# Patient Record
Sex: FEMALE | Race: WHITE | NOT HISPANIC OR LATINO | URBAN - METROPOLITAN AREA
[De-identification: names, ages, dates, MRNs, and addresses within clinical notes are randomized per-mention and may not be internally consistent; named-entity substitution may affect disease eponyms.]

---

## 2017-01-10 ENCOUNTER — OUTPATIENT (OUTPATIENT)
Dept: OUTPATIENT SERVICES | Facility: HOSPITAL | Age: 82
LOS: 1 days | End: 2017-01-10
Payer: MEDICARE

## 2017-01-10 VITALS
SYSTOLIC BLOOD PRESSURE: 132 MMHG | HEART RATE: 90 BPM | OXYGEN SATURATION: 99 % | DIASTOLIC BLOOD PRESSURE: 70 MMHG | WEIGHT: 177.03 LBS | TEMPERATURE: 99 F | HEIGHT: 59 IN | RESPIRATION RATE: 20 BRPM

## 2017-01-10 DIAGNOSIS — M75.122 COMPLETE ROTATOR CUFF TEAR OR RUPTURE OF LEFT SHOULDER, NOT SPECIFIED AS TRAUMATIC: ICD-10-CM

## 2017-01-10 DIAGNOSIS — H26.9 UNSPECIFIED CATARACT: ICD-10-CM

## 2017-01-10 DIAGNOSIS — E78.5 HYPERLIPIDEMIA, UNSPECIFIED: ICD-10-CM

## 2017-01-10 DIAGNOSIS — E66.9 OBESITY, UNSPECIFIED: ICD-10-CM

## 2017-01-10 DIAGNOSIS — Z90.710 ACQUIRED ABSENCE OF BOTH CERVIX AND UTERUS: Chronic | ICD-10-CM

## 2017-01-10 DIAGNOSIS — J45.909 UNSPECIFIED ASTHMA, UNCOMPLICATED: ICD-10-CM

## 2017-01-10 DIAGNOSIS — Z98.890 OTHER SPECIFIED POSTPROCEDURAL STATES: Chronic | ICD-10-CM

## 2017-01-10 DIAGNOSIS — E03.9 HYPOTHYROIDISM, UNSPECIFIED: ICD-10-CM

## 2017-01-10 DIAGNOSIS — I10 ESSENTIAL (PRIMARY) HYPERTENSION: ICD-10-CM

## 2017-01-10 LAB
APPEARANCE UR: CLEAR — SIGNIFICANT CHANGE UP
APTT BLD: 32.2 SEC — SIGNIFICANT CHANGE UP (ref 27.5–37.4)
BILIRUB UR-MCNC: NEGATIVE — SIGNIFICANT CHANGE UP
BLD GP AB SCN SERPL QL: NEGATIVE — SIGNIFICANT CHANGE UP
BLOOD UR QL VISUAL: NEGATIVE — SIGNIFICANT CHANGE UP
BUN SERPL-MCNC: 25 MG/DL — HIGH (ref 7–23)
CALCIUM SERPL-MCNC: 10.1 MG/DL — SIGNIFICANT CHANGE UP (ref 8.4–10.5)
CHLORIDE SERPL-SCNC: 98 MMOL/L — SIGNIFICANT CHANGE UP (ref 98–107)
CO2 SERPL-SCNC: 23 MMOL/L — SIGNIFICANT CHANGE UP (ref 22–31)
COLOR SPEC: SIGNIFICANT CHANGE UP
CREAT SERPL-MCNC: 1.08 MG/DL — SIGNIFICANT CHANGE UP (ref 0.5–1.3)
GLUCOSE SERPL-MCNC: 64 MG/DL — LOW (ref 70–99)
GLUCOSE UR-MCNC: NEGATIVE — SIGNIFICANT CHANGE UP
HCT VFR BLD CALC: 32.9 % — LOW (ref 34.5–45)
HGB BLD-MCNC: 10.5 G/DL — LOW (ref 11.5–15.5)
HYALINE CASTS # UR AUTO: SIGNIFICANT CHANGE UP (ref 0–?)
INR BLD: 0.98 — SIGNIFICANT CHANGE UP (ref 0.87–1.18)
KETONES UR-MCNC: NEGATIVE — SIGNIFICANT CHANGE UP
LEUKOCYTE ESTERASE UR-ACNC: HIGH
MCHC RBC-ENTMCNC: 30.3 PG — SIGNIFICANT CHANGE UP (ref 27–34)
MCHC RBC-ENTMCNC: 31.9 % — LOW (ref 32–36)
MCV RBC AUTO: 94.8 FL — SIGNIFICANT CHANGE UP (ref 80–100)
MUCOUS THREADS # UR AUTO: SIGNIFICANT CHANGE UP
NITRITE UR-MCNC: NEGATIVE — SIGNIFICANT CHANGE UP
PH UR: 6.5 — SIGNIFICANT CHANGE UP (ref 4.6–8)
PLATELET # BLD AUTO: 224 K/UL — SIGNIFICANT CHANGE UP (ref 150–400)
PMV BLD: 11.2 FL — SIGNIFICANT CHANGE UP (ref 7–13)
POTASSIUM SERPL-MCNC: 4.2 MMOL/L — SIGNIFICANT CHANGE UP (ref 3.5–5.3)
POTASSIUM SERPL-SCNC: 4.2 MMOL/L — SIGNIFICANT CHANGE UP (ref 3.5–5.3)
PROT UR-MCNC: 20 — SIGNIFICANT CHANGE UP
PROTHROM AB SERPL-ACNC: 11.2 SEC — SIGNIFICANT CHANGE UP (ref 10–13.1)
RBC # BLD: 3.47 M/UL — LOW (ref 3.8–5.2)
RBC # FLD: 14.6 % — HIGH (ref 10.3–14.5)
RBC CASTS # UR COMP ASSIST: SIGNIFICANT CHANGE UP (ref 0–?)
RH IG SCN BLD-IMP: POSITIVE — SIGNIFICANT CHANGE UP
SODIUM SERPL-SCNC: 141 MMOL/L — SIGNIFICANT CHANGE UP (ref 135–145)
SP GR SPEC: 1.01 — SIGNIFICANT CHANGE UP (ref 1–1.03)
UROBILINOGEN FLD QL: NORMAL E.U. — SIGNIFICANT CHANGE UP (ref 0.1–0.2)
WBC # BLD: 23.78 K/UL — HIGH (ref 3.8–10.5)
WBC # FLD AUTO: 23.78 K/UL — HIGH (ref 3.8–10.5)
WBC UR QL: SIGNIFICANT CHANGE UP (ref 0–?)

## 2017-01-10 PROCEDURE — 71020: CPT | Mod: 26

## 2017-01-10 PROCEDURE — 93010 ELECTROCARDIOGRAM REPORT: CPT

## 2017-01-10 PROCEDURE — 73030 X-RAY EXAM OF SHOULDER: CPT | Mod: 26,LT

## 2017-01-10 RX ORDER — SODIUM CHLORIDE 9 MG/ML
3 INJECTION INTRAMUSCULAR; INTRAVENOUS; SUBCUTANEOUS EVERY 8 HOURS
Qty: 0 | Refills: 0 | Status: DISCONTINUED | OUTPATIENT
Start: 2017-01-23 | End: 2017-01-26

## 2017-01-10 NOTE — H&P PST ADULT - NEGATIVE GASTROINTESTINAL SYMPTOMS
no vomiting/no change in bowel habits/no abdominal pain/no constipation/no diarrhea/no melena/no flatulence/no nausea

## 2017-01-10 NOTE — H&P PST ADULT - NEGATIVE CARDIOVASCULAR SYMPTOMS
no orthopnea/no claudication/no peripheral edema/no chest pain/no palpitations/no dyspnea on exertion/no paroxysmal nocturnal dyspnea

## 2017-01-10 NOTE — H&P PST ADULT - NEGATIVE ALLERGY TYPES
no indoor environmental allergies/no reactions to medicines/no outdoor environmental allergies/no reactions to food

## 2017-01-10 NOTE — H&P PST ADULT - NSANTHOSAYNRD_GEN_A_CORE
No. REJI screening performed.  STOP BANG Legend: 0-2 = LOW Risk; 3-4 = INTERMEDIATE Risk; 5-8 = HIGH Risk

## 2017-01-10 NOTE — H&P PST ADULT - NEGATIVE OPHTHALMOLOGIC SYMPTOMS
no diplopia/no photophobia no scleral injection R/no loss of vision L/no pain L/no scleral injection L/no irritation R/no photophobia/no discharge L/no loss of vision R/no discharge R/no diplopia/no pain R/no irritation L

## 2017-01-10 NOTE — H&P PST ADULT - MUSCULOSKELETAL
detailed exam ROM intact/no joint warmth/no calf tenderness/no joint swelling/normal strength/no joint erythema details… no joint warmth/no joint erythema/decreased ROM due to pain/no calf tenderness/normal strength/no joint swelling

## 2017-01-10 NOTE — H&P PST ADULT - NS MD HP INPLANTS MED DEV
dental implants & right toatl shoulder replacement/Artificial joint dental implants & right total shoulder replacement/Artificial joint

## 2017-01-10 NOTE — H&P PST ADULT - HISTORY OF PRESENT ILLNESS
80 yo female came to department accompanied by daughter with medical h/o Hypothyroidism, Asthma, HTN and HLD. Pt reports h/o Rotator cuff problems to both shoulders in which she had Right total shoulder replacement done in summer 2016. Pt presents today for presurgical evaluation for Left Total Shoulder Replacement, Possible Reverse scheduled for 1/23/2017.

## 2017-01-10 NOTE — H&P PST ADULT - RS GEN PE MLT RESP DETAILS PC
good air movement/breath sounds equal/clear to auscultation bilaterally/respirations non-labored/airway patent

## 2017-01-10 NOTE — H&P PST ADULT - NEGATIVE BREAST SYMPTOMS
no nipple discharge L/no nipple discharge R/no breast lump R/no breast tenderness L/no breast tenderness R/no breast lump L

## 2017-01-10 NOTE — H&P PST ADULT - NEGATIVE ENMT SYMPTOMS
no post-nasal discharge/no tinnitus/no nasal discharge/no hearing difficulty/no vertigo/no throat pain/no nasal obstruction/no nasal congestion/no ear pain/no sinus symptoms/no dysphagia no throat pain/no nasal discharge/no ear pain/no sinus symptoms/no dysphagia/no dry mouth/no tinnitus/no vertigo/no hearing difficulty/no nasal congestion/no nasal obstruction/no post-nasal discharge

## 2017-01-10 NOTE — H&P PST ADULT - NEGATIVE NEUROLOGICAL SYMPTOMS
no generalized seizures/no syncope/no transient paralysis/no paresthesias/no vertigo/no focal seizures/no loss of sensation/no weakness/no tremors no transient paralysis/no headache/no loss of consciousness/no loss of sensation/no vertigo/no generalized seizures/no focal seizures/no tremors/no paresthesias/no syncope/no weakness

## 2017-01-10 NOTE — H&P PST ADULT - PMH
Anal cancer  diagnosed in 2010, had chemotherapy and radiation in 2010.  Asthma  no h/o intubation, last episode of hospitalization due to asthma 10 years ago . No h/o medications on a daily basis  Complete rotator cuff tear or rupture of left shoulder, not specified as traumatic    Complete rotator cuff tear or rupture of right shoulder, not specified as traumatic    Hyperlipemia    Hypertension    Hypothyroidism Anal cancer  diagnosed in 2010, had chemotherapy and radiation in 2010.  Asthma  no h/o intubation, last episode of hospitalization due to asthma 10 years ago . No h/o medications on a daily basis  Cataract    Complete rotator cuff tear or rupture of left shoulder, not specified as traumatic    Complete rotator cuff tear or rupture of right shoulder, not specified as traumatic    Hyperlipemia    Hypertension    Hypothyroidism

## 2017-01-10 NOTE — H&P PST ADULT - PROBLEM SELECTOR PLAN 1
Left Total Shoulder Replacement, Possible Reverse scheduled for 1/23/2017.  Pre-op instructions given. Pt verbalized understanding.  Pepcid given for GI prophylaxis.  Chlorhexidine wash instructions given.  Ortho order set completed.  Medical clearance requested - METS less than 4 Left Total Shoulder Replacement, Possible Reverse scheduled for 1/23/2017.  Pre-op instructions given. Pt verbalized understanding.  Pepcid given for GI prophylaxis.  Chlorhexidine wash instructions given.  Ortho order set completed.  Pt sent for CXR and left shoulder surgery  Medical clearance requested - METS less than 4 Left Total Shoulder Replacement, Possible Reverse scheduled for 1/23/2017.  Pre-op instructions given. Pt verbalized understanding.  Pepcid given for GI prophylaxis.  Chlorhexidine wash instructions given.  Pt sent for CXR and left shoulder XRAY.  Medical clearance requested - METS less than 4, SOB with activity.

## 2017-01-10 NOTE — H&P PST ADULT - MALLAMPATI CLASS
Class III - visualization of the soft palate and the base of the uvula Class II - visualization of the soft palate, fauces, and uvula

## 2017-01-10 NOTE — H&P PST ADULT - LYMPHATIC
anterior cervical R/anterior cervical L/posterior cervical R/posterior cervical L/supraclavicular L/supraclavicular R

## 2017-01-10 NOTE — H&P PST ADULT - NEGATIVE MUSCULOSKELETAL SYMPTOMS
no muscle weakness/no neck pain/no muscle cramps/no myalgia/no leg pain L/no leg pain R/no arthritis/no back pain/no joint swelling

## 2017-01-10 NOTE — H&P PST ADULT - CONSTITUTIONAL DETAILS
well-nourished/BMI 35.8/no distress/well-developed/well-groomed well-nourished/well-groomed/obese/well-developed/BMI 35.8/no distress

## 2017-01-10 NOTE — H&P PST ADULT - NEGATIVE PSYCHIATRIC SYMPTOMS
no memory loss/no mood swings/no agitation/no insomnia/no paranoia/no anxiety/no depression/no suicidal ideation

## 2017-01-12 LAB
BACTERIA UR CULT: SIGNIFICANT CHANGE UP
SPECIMEN SOURCE: SIGNIFICANT CHANGE UP

## 2017-01-20 NOTE — ASU PATIENT PROFILE, ADULT - PMH
Anal cancer  diagnosed in 2010, had chemotherapy and radiation in 2010.  Asthma  no h/o intubation, last episode of hospitalization due to asthma 10 years ago . No h/o medications on a daily basis  Cataract    Complete rotator cuff tear or rupture of left shoulder, not specified as traumatic    Complete rotator cuff tear or rupture of right shoulder, not specified as traumatic    Hyperlipemia    Hypertension    Hypothyroidism

## 2017-01-22 ENCOUNTER — RESULT REVIEW (OUTPATIENT)
Age: 82
End: 2017-01-22

## 2017-01-23 ENCOUNTER — INPATIENT (INPATIENT)
Facility: HOSPITAL | Age: 82
LOS: 2 days | Discharge: NURSING, REHAB OR LONG-TERM CARE | End: 2017-01-26
Attending: NEUROMUSCULOSKELETAL MEDICINE, SPORTS MEDICINE | Admitting: NEUROMUSCULOSKELETAL MEDICINE, SPORTS MEDICINE
Payer: MEDICARE

## 2017-01-23 VITALS
OXYGEN SATURATION: 99 % | RESPIRATION RATE: 16 BRPM | WEIGHT: 177.03 LBS | HEART RATE: 62 BPM | DIASTOLIC BLOOD PRESSURE: 68 MMHG | HEIGHT: 59 IN | SYSTOLIC BLOOD PRESSURE: 127 MMHG | TEMPERATURE: 98 F

## 2017-01-23 DIAGNOSIS — Z98.890 OTHER SPECIFIED POSTPROCEDURAL STATES: Chronic | ICD-10-CM

## 2017-01-23 DIAGNOSIS — M75.122 COMPLETE ROTATOR CUFF TEAR OR RUPTURE OF LEFT SHOULDER, NOT SPECIFIED AS TRAUMATIC: ICD-10-CM

## 2017-01-23 DIAGNOSIS — Z90.710 ACQUIRED ABSENCE OF BOTH CERVIX AND UTERUS: Chronic | ICD-10-CM

## 2017-01-23 LAB
BUN SERPL-MCNC: 23 MG/DL — SIGNIFICANT CHANGE UP (ref 7–23)
CALCIUM SERPL-MCNC: 9.2 MG/DL — SIGNIFICANT CHANGE UP (ref 8.4–10.5)
CHLORIDE SERPL-SCNC: 106 MMOL/L — SIGNIFICANT CHANGE UP (ref 98–107)
CO2 SERPL-SCNC: 21 MMOL/L — LOW (ref 22–31)
CREAT SERPL-MCNC: 1.35 MG/DL — HIGH (ref 0.5–1.3)
GLUCOSE SERPL-MCNC: 132 MG/DL — HIGH (ref 70–99)
HCT VFR BLD CALC: 30.1 % — LOW (ref 34.5–45)
HGB BLD-MCNC: 9.7 G/DL — LOW (ref 11.5–15.5)
MCHC RBC-ENTMCNC: 30.2 PG — SIGNIFICANT CHANGE UP (ref 27–34)
MCHC RBC-ENTMCNC: 32.2 % — SIGNIFICANT CHANGE UP (ref 32–36)
MCV RBC AUTO: 93.8 FL — SIGNIFICANT CHANGE UP (ref 80–100)
PLATELET # BLD AUTO: 194 K/UL — SIGNIFICANT CHANGE UP (ref 150–400)
PMV BLD: 11 FL — SIGNIFICANT CHANGE UP (ref 7–13)
POTASSIUM SERPL-MCNC: 4.3 MMOL/L — SIGNIFICANT CHANGE UP (ref 3.5–5.3)
POTASSIUM SERPL-SCNC: 4.3 MMOL/L — SIGNIFICANT CHANGE UP (ref 3.5–5.3)
RBC # BLD: 3.21 M/UL — LOW (ref 3.8–5.2)
RBC # FLD: 14.4 % — SIGNIFICANT CHANGE UP (ref 10.3–14.5)
SODIUM SERPL-SCNC: 142 MMOL/L — SIGNIFICANT CHANGE UP (ref 135–145)
WBC # BLD: 13.2 K/UL — HIGH (ref 3.8–10.5)
WBC # FLD AUTO: 13.2 K/UL — HIGH (ref 3.8–10.5)

## 2017-01-23 PROCEDURE — 88304 TISSUE EXAM BY PATHOLOGIST: CPT | Mod: 26

## 2017-01-23 PROCEDURE — 73030 X-RAY EXAM OF SHOULDER: CPT | Mod: 26,LT

## 2017-01-23 PROCEDURE — 88311 DECALCIFY TISSUE: CPT | Mod: 26

## 2017-01-23 RX ORDER — VALSARTAN 80 MG/1
160 TABLET ORAL DAILY
Qty: 0 | Refills: 0 | Status: DISCONTINUED | OUTPATIENT
Start: 2017-01-23 | End: 2017-01-26

## 2017-01-23 RX ORDER — POLYETHYLENE GLYCOL 3350 17 G/17G
17 POWDER, FOR SOLUTION ORAL DAILY
Qty: 0 | Refills: 0 | Status: DISCONTINUED | OUTPATIENT
Start: 2017-01-23 | End: 2017-01-26

## 2017-01-23 RX ORDER — ONDANSETRON 8 MG/1
4 TABLET, FILM COATED ORAL EVERY 6 HOURS
Qty: 0 | Refills: 0 | Status: DISCONTINUED | OUTPATIENT
Start: 2017-01-23 | End: 2017-01-26

## 2017-01-23 RX ORDER — MAGNESIUM HYDROXIDE 400 MG/1
30 TABLET, CHEWABLE ORAL DAILY
Qty: 0 | Refills: 0 | Status: DISCONTINUED | OUTPATIENT
Start: 2017-01-23 | End: 2017-01-26

## 2017-01-23 RX ORDER — LEVOTHYROXINE SODIUM 125 MCG
88 TABLET ORAL DAILY
Qty: 0 | Refills: 0 | Status: DISCONTINUED | OUTPATIENT
Start: 2017-01-23 | End: 2017-01-26

## 2017-01-23 RX ORDER — OXYCODONE HYDROCHLORIDE 5 MG/1
10 TABLET ORAL EVERY 4 HOURS
Qty: 0 | Refills: 0 | Status: DISCONTINUED | OUTPATIENT
Start: 2017-01-23 | End: 2017-01-26

## 2017-01-23 RX ORDER — ALBUTEROL 90 UG/1
2 AEROSOL, METERED ORAL EVERY 6 HOURS
Qty: 0 | Refills: 0 | Status: DISCONTINUED | OUTPATIENT
Start: 2017-01-23 | End: 2017-01-26

## 2017-01-23 RX ORDER — ACETAMINOPHEN 500 MG
650 TABLET ORAL EVERY 6 HOURS
Qty: 0 | Refills: 0 | Status: DISCONTINUED | OUTPATIENT
Start: 2017-01-23 | End: 2017-01-26

## 2017-01-23 RX ORDER — DOCUSATE SODIUM 100 MG
100 CAPSULE ORAL THREE TIMES A DAY
Qty: 0 | Refills: 0 | Status: DISCONTINUED | OUTPATIENT
Start: 2017-01-23 | End: 2017-01-26

## 2017-01-23 RX ORDER — SODIUM CHLORIDE 9 MG/ML
1000 INJECTION, SOLUTION INTRAVENOUS
Qty: 0 | Refills: 0 | Status: DISCONTINUED | OUTPATIENT
Start: 2017-01-23 | End: 2017-01-23

## 2017-01-23 RX ORDER — ACETAMINOPHEN 500 MG
650 TABLET ORAL EVERY 6 HOURS
Qty: 0 | Refills: 0 | Status: DISCONTINUED | OUTPATIENT
Start: 2017-01-23 | End: 2017-01-25

## 2017-01-23 RX ORDER — CEFAZOLIN SODIUM 1 G
2000 VIAL (EA) INJECTION EVERY 8 HOURS
Qty: 0 | Refills: 0 | Status: COMPLETED | OUTPATIENT
Start: 2017-01-23 | End: 2017-01-24

## 2017-01-23 RX ORDER — OXYCODONE HYDROCHLORIDE 5 MG/1
5 TABLET ORAL EVERY 4 HOURS
Qty: 0 | Refills: 0 | Status: DISCONTINUED | OUTPATIENT
Start: 2017-01-23 | End: 2017-01-26

## 2017-01-23 RX ORDER — ASPIRIN/CALCIUM CARB/MAGNESIUM 324 MG
325 TABLET ORAL DAILY
Qty: 0 | Refills: 0 | Status: DISCONTINUED | OUTPATIENT
Start: 2017-01-23 | End: 2017-01-26

## 2017-01-23 RX ORDER — SODIUM CHLORIDE 9 MG/ML
1000 INJECTION, SOLUTION INTRAVENOUS
Qty: 0 | Refills: 0 | Status: DISCONTINUED | OUTPATIENT
Start: 2017-01-23 | End: 2017-01-24

## 2017-01-23 RX ORDER — SENNA PLUS 8.6 MG/1
2 TABLET ORAL AT BEDTIME
Qty: 0 | Refills: 0 | Status: DISCONTINUED | OUTPATIENT
Start: 2017-01-23 | End: 2017-01-25

## 2017-01-23 RX ORDER — SIMVASTATIN 20 MG/1
10 TABLET, FILM COATED ORAL AT BEDTIME
Qty: 0 | Refills: 0 | Status: DISCONTINUED | OUTPATIENT
Start: 2017-01-23 | End: 2017-01-26

## 2017-01-23 RX ORDER — MORPHINE SULFATE 50 MG/1
4 CAPSULE, EXTENDED RELEASE ORAL EVERY 4 HOURS
Qty: 0 | Refills: 0 | Status: DISCONTINUED | OUTPATIENT
Start: 2017-01-23 | End: 2017-01-24

## 2017-01-23 RX ADMIN — SODIUM CHLORIDE 75 MILLILITER(S): 9 INJECTION, SOLUTION INTRAVENOUS at 16:30

## 2017-01-23 RX ADMIN — Medication 100 MILLIGRAM(S): at 23:24

## 2017-01-23 RX ADMIN — SIMVASTATIN 10 MILLIGRAM(S): 20 TABLET, FILM COATED ORAL at 21:56

## 2017-01-23 RX ADMIN — Medication 650 MILLIGRAM(S): at 22:25

## 2017-01-23 RX ADMIN — Medication 650 MILLIGRAM(S): at 21:56

## 2017-01-23 RX ADMIN — Medication 100 MILLIGRAM(S): at 21:56

## 2017-01-23 NOTE — BRIEF OPERATIVE NOTE - POST-OP DX
Osteoarthritis of left shoulder, unspecified osteoarthritis type  01/23/2017    Active  Beatrice Fragoso

## 2017-01-23 NOTE — PATIENT PROFILE ADULT. - PMH
Anal cancer  diagnosed in 2010, had chemotherapy and radiation in 2010.  Asthma  no h/o intubation, last episode of hospitalization due to asthma 10 years ago . No h/o medications on a daily basis  Complete rotator cuff tear or rupture of right shoulder, not specified as traumatic    Hypertension    Hypothyroidism

## 2017-01-24 LAB
BUN SERPL-MCNC: 26 MG/DL — HIGH (ref 7–23)
BUN SERPL-MCNC: 27 MG/DL — HIGH (ref 7–23)
CALCIUM SERPL-MCNC: 9 MG/DL — SIGNIFICANT CHANGE UP (ref 8.4–10.5)
CALCIUM SERPL-MCNC: 9.7 MG/DL — SIGNIFICANT CHANGE UP (ref 8.4–10.5)
CHLORIDE SERPL-SCNC: 105 MMOL/L — SIGNIFICANT CHANGE UP (ref 98–107)
CHLORIDE SERPL-SCNC: 105 MMOL/L — SIGNIFICANT CHANGE UP (ref 98–107)
CO2 SERPL-SCNC: 21 MMOL/L — LOW (ref 22–31)
CO2 SERPL-SCNC: 23 MMOL/L — SIGNIFICANT CHANGE UP (ref 22–31)
CREAT SERPL-MCNC: 1.27 MG/DL — SIGNIFICANT CHANGE UP (ref 0.5–1.3)
CREAT SERPL-MCNC: 1.38 MG/DL — HIGH (ref 0.5–1.3)
GLUCOSE SERPL-MCNC: 130 MG/DL — HIGH (ref 70–99)
GLUCOSE SERPL-MCNC: 141 MG/DL — HIGH (ref 70–99)
HCT VFR BLD CALC: 26.4 % — LOW (ref 34.5–45)
HGB BLD-MCNC: 8.3 G/DL — LOW (ref 11.5–15.5)
MCHC RBC-ENTMCNC: 29.4 PG — SIGNIFICANT CHANGE UP (ref 27–34)
MCHC RBC-ENTMCNC: 31.4 % — LOW (ref 32–36)
MCV RBC AUTO: 93.6 FL — SIGNIFICANT CHANGE UP (ref 80–100)
PLATELET # BLD AUTO: 172 K/UL — SIGNIFICANT CHANGE UP (ref 150–400)
PMV BLD: 11.5 FL — SIGNIFICANT CHANGE UP (ref 7–13)
POTASSIUM SERPL-MCNC: 5.4 MMOL/L — HIGH (ref 3.5–5.3)
POTASSIUM SERPL-MCNC: 5.8 MMOL/L — HIGH (ref 3.5–5.3)
POTASSIUM SERPL-SCNC: 5.4 MMOL/L — HIGH (ref 3.5–5.3)
POTASSIUM SERPL-SCNC: 5.8 MMOL/L — HIGH (ref 3.5–5.3)
RBC # BLD: 2.82 M/UL — LOW (ref 3.8–5.2)
RBC # FLD: 14.6 % — HIGH (ref 10.3–14.5)
SODIUM SERPL-SCNC: 140 MMOL/L — SIGNIFICANT CHANGE UP (ref 135–145)
SODIUM SERPL-SCNC: 140 MMOL/L — SIGNIFICANT CHANGE UP (ref 135–145)
WBC # BLD: 13.08 K/UL — HIGH (ref 3.8–10.5)
WBC # FLD AUTO: 13.08 K/UL — HIGH (ref 3.8–10.5)

## 2017-01-24 RX ORDER — SODIUM POLYSTYRENE SULFONATE 4.1 MEQ/G
15 POWDER, FOR SUSPENSION ORAL ONCE
Qty: 0 | Refills: 0 | Status: COMPLETED | OUTPATIENT
Start: 2017-01-24 | End: 2017-01-24

## 2017-01-24 RX ORDER — SODIUM CHLORIDE 9 MG/ML
1000 INJECTION INTRAMUSCULAR; INTRAVENOUS; SUBCUTANEOUS
Qty: 0 | Refills: 0 | Status: DISCONTINUED | OUTPATIENT
Start: 2017-01-24 | End: 2017-01-26

## 2017-01-24 RX ORDER — MORPHINE SULFATE 50 MG/1
2 CAPSULE, EXTENDED RELEASE ORAL EVERY 4 HOURS
Qty: 0 | Refills: 0 | Status: DISCONTINUED | OUTPATIENT
Start: 2017-01-24 | End: 2017-01-26

## 2017-01-24 RX ADMIN — Medication 650 MILLIGRAM(S): at 23:57

## 2017-01-24 RX ADMIN — Medication 88 MICROGRAM(S): at 05:28

## 2017-01-24 RX ADMIN — Medication 100 MILLIGRAM(S): at 06:58

## 2017-01-24 RX ADMIN — POLYETHYLENE GLYCOL 3350 17 GRAM(S): 17 POWDER, FOR SOLUTION ORAL at 11:20

## 2017-01-24 RX ADMIN — Medication 100 MILLIGRAM(S): at 21:30

## 2017-01-24 RX ADMIN — SODIUM CHLORIDE 75 MILLILITER(S): 9 INJECTION INTRAMUSCULAR; INTRAVENOUS; SUBCUTANEOUS at 11:20

## 2017-01-24 RX ADMIN — SODIUM CHLORIDE 3 MILLILITER(S): 9 INJECTION INTRAMUSCULAR; INTRAVENOUS; SUBCUTANEOUS at 13:53

## 2017-01-24 RX ADMIN — Medication 650 MILLIGRAM(S): at 18:10

## 2017-01-24 RX ADMIN — Medication 650 MILLIGRAM(S): at 07:07

## 2017-01-24 RX ADMIN — SODIUM POLYSTYRENE SULFONATE 15 GRAM(S): 4.1 POWDER, FOR SUSPENSION ORAL at 20:33

## 2017-01-24 RX ADMIN — Medication 100 MILLIGRAM(S): at 13:57

## 2017-01-24 RX ADMIN — SIMVASTATIN 10 MILLIGRAM(S): 20 TABLET, FILM COATED ORAL at 21:30

## 2017-01-24 RX ADMIN — SODIUM CHLORIDE 3 MILLILITER(S): 9 INJECTION INTRAMUSCULAR; INTRAVENOUS; SUBCUTANEOUS at 21:26

## 2017-01-24 RX ADMIN — Medication 325 MILLIGRAM(S): at 11:20

## 2017-01-24 RX ADMIN — Medication 650 MILLIGRAM(S): at 17:12

## 2017-01-24 NOTE — DISCHARGE NOTE ADULT - PATIENT PORTAL LINK FT
“You can access the Follow[facility] Patient Portal, offered by [facility], by registering with the following website: [email or web address]”

## 2017-01-24 NOTE — DISCHARGE NOTE ADULT - HOSPITAL COURSE
82yo  is s/p left total shoulder arthroplasty on 1/23/17 without any intraoperative complications.  Pt is doing well and stable for discharge. Leave aquacel bandage intact until follow up visit POD#14. D/C sutures/staples POD #14 if present.  Non-weight bearing to the surgical arm, no external rotaion >30degree, may do Codmans. call Dr. Dangelo' office to make appointment for follow up in one to two weeks. follow up with your PMD for general continuity of care and management

## 2017-01-24 NOTE — PHYSICAL THERAPY INITIAL EVALUATION ADULT - RANGE OF MOTION EXAMINATION, REHAB EVAL
Right UE ROM was WFL (within functional limits)/bilateral lower extremity ROM was WFL (within functional limits)/deficits as listed below/L UE NT, in sling

## 2017-01-24 NOTE — DISCHARGE NOTE ADULT - INSTRUCTIONS
no change Keep  aquacel dressing in place until postop appointment with DR Dangelo.  Notify Dr Dangelo if you experience any increase in pain not relieved with pain medication, any redness, drainage or swelling around incision or any fever >101.0.  Drink plenty of fluids, take over the counter stool softeners to assist with constipation related to pain medication.

## 2017-01-24 NOTE — OCCUPATIONAL THERAPY INITIAL EVALUATION ADULT - RANGE OF MOTION EXAMINATION, UPPER EXTREMITY
R UE: shoulder 0- 130 degrees, and WFL distally. L UE: shoulder NT, elbow PROM WFL, hand/wrist AROM WFL

## 2017-01-24 NOTE — DISCHARGE NOTE ADULT - MEDICATION SUMMARY - MEDICATIONS TO STOP TAKING
I will STOP taking the medications listed below when I get home from the hospital:    aspirin 81 mg oral delayed release tablet  -- 1 tab(s) by mouth once a day last dose 1/13

## 2017-01-24 NOTE — DISCHARGE NOTE ADULT - CONDITIONS AT DISCHARGE
stable stable, Left shoulder aquacel dressing in place clean dry and intact.  Tolerating diet, voiding well oob with assist.

## 2017-01-24 NOTE — DISCHARGE NOTE ADULT - PLAN OF CARE
pain control, surgical site healing, ambulation, return to ADL's 82yo  is s/p left total shoulder arthroplasty on 1/23/17 without any intraoperative complications.  Pt is doing well and stable for discharge. Leave aquacel bandage intact until follow up visit POD#14. D/C sutures/staples POD #14 if present.  Non-weight bearing to the surgical arm, no external rotaion >30degree, may do Codmans. call Dr. Dangelo' office to make appointment for follow up in one to two weeks. follow up with your PMD for general continuity of care and management

## 2017-01-24 NOTE — DISCHARGE NOTE ADULT - CARE PROVIDER_API CALL
Tyrone Dangelo), Orthopaedic Surgery  [address]  [place] [address]  Phone: [phone]  Fax: [phone]

## 2017-01-24 NOTE — OCCUPATIONAL THERAPY INITIAL EVALUATION ADULT - PERTINENT HX OF CURRENT PROBLEM, REHAB EVAL
80 yo female came to department accompanied by daughter with medical h/o Hypothyroidism, Asthma, HTN and HLD. Pt reports h/o Rotator cuff problems to both shoulders in which she had Right total shoulder replacement done in summer 2016. Pt now s/p L TSA.

## 2017-01-24 NOTE — DISCHARGE NOTE ADULT - CARE PLAN
Principal Discharge DX:	Complete rotator cuff tear or rupture of right shoulder, not specified as traumatic  Goal:	pain control, surgical site healing, ambulation, return to ADL's  Instructions for follow-up, activity and diet:	80yo  is s/p left total shoulder arthroplasty on 1/23/17 without any intraoperative complications.  Pt is doing well and stable for discharge. Leave aquacel bandage intact until follow up visit POD#14. D/C sutures/staples POD #14 if present.  Non-weight bearing to the surgical arm, no external rotaion >30degree, may do Codmans. call Dr. Dangelo' office to make appointment for follow up in one to two weeks. follow up with your PMD for general continuity of care and management

## 2017-01-24 NOTE — DISCHARGE NOTE ADULT - MEDICATION SUMMARY - MEDICATIONS TO TAKE
I will START or STAY ON the medications listed below when I get home from the hospital:    aspirin 325 mg oral delayed release tablet  -- 1 tab(s) by mouth once a day  -- Indication: For anticoagulation    oxyCODONE 5 mg oral tablet  -- 1 tab(s) by mouth every 4 hours, As needed, Moderate Pain (4 - 6)  -- Indication: For pain    oxyCODONE 10 mg oral tablet  -- 1 tab(s) by mouth every 4 hours, As needed, Severe Pain (7 - 10)  -- Indication: For pain    traMADol 50 mg oral tablet  -- 1 tab(s) by mouth 3 times a day  -- Indication: For pain    valsartan 160 mg oral tablet  -- 1 tab(s) by mouth once a day  -- HOLD SBP<100 HR<60   -- Indication: For HTN    simvastatin 10 mg oral tablet  -- 1 tab(s) by mouth once a day (at bedtime)  -- Indication: For Chol    albuterol 90 mcg/inh inhalation aerosol  -- 2 puff(s) inhaled every 6 hours, As needed, Shortness of Breath and/or Wheezing  -- Indication: For Home med    docusate sodium 100 mg oral capsule  -- 1 cap(s) by mouth 3 times a day  -- Indication: For bowel  regimen     polyethylene glycol 3350 oral powder for reconstitution  -- 17 gram(s) by mouth once a day  -- Indication: For bowel regimen     senna oral tablet  -- 2 tab(s) by mouth once a day (at bedtime)  -- Indication: For bowel regimen    levothyroxine 88 mcg (0.088 mg) oral tablet  -- 1 tab(s) by mouth once a day  -- Indication: For Home med    calcium-vitamin D 500 mg-200 intl units oral tablet  -- 1 tab(s) by mouth 2 times a day  -- Indication: For vit    Multiple Vitamins oral tablet  -- 1 tab(s) by mouth once a day  -- Indication: For vit    ascorbic acid 500 mg oral tablet  -- 1 tab(s) by mouth 2 times a day  -- Indication: For vit    biotin  -- 5000 milligram(s) by mouth once a day  -- Indication: For vit    Vitamin D3 1000 intl units oral capsule  -- 1 cap(s) by mouth once a day  -- Indication: For vit

## 2017-01-25 LAB
BUN SERPL-MCNC: 24 MG/DL — HIGH (ref 7–23)
CALCIUM SERPL-MCNC: 8.8 MG/DL — SIGNIFICANT CHANGE UP (ref 8.4–10.5)
CHLORIDE SERPL-SCNC: 109 MMOL/L — HIGH (ref 98–107)
CO2 SERPL-SCNC: 23 MMOL/L — SIGNIFICANT CHANGE UP (ref 22–31)
CREAT SERPL-MCNC: 1.11 MG/DL — SIGNIFICANT CHANGE UP (ref 0.5–1.3)
GLUCOSE SERPL-MCNC: 103 MG/DL — HIGH (ref 70–99)
POTASSIUM SERPL-MCNC: 4.6 MMOL/L — SIGNIFICANT CHANGE UP (ref 3.5–5.3)
POTASSIUM SERPL-SCNC: 4.6 MMOL/L — SIGNIFICANT CHANGE UP (ref 3.5–5.3)
SODIUM SERPL-SCNC: 143 MMOL/L — SIGNIFICANT CHANGE UP (ref 135–145)

## 2017-01-25 PROCEDURE — 73020 X-RAY EXAM OF SHOULDER: CPT | Mod: 26,LT

## 2017-01-25 RX ORDER — TRAMADOL HYDROCHLORIDE 50 MG/1
50 TABLET ORAL ONCE
Qty: 0 | Refills: 0 | Status: DISCONTINUED | OUTPATIENT
Start: 2017-01-25 | End: 2017-01-25

## 2017-01-25 RX ORDER — ASCORBIC ACID 60 MG
500 TABLET,CHEWABLE ORAL
Qty: 0 | Refills: 0 | Status: DISCONTINUED | OUTPATIENT
Start: 2017-01-25 | End: 2017-01-26

## 2017-01-25 RX ORDER — ACETAMINOPHEN 500 MG
650 TABLET ORAL EVERY 6 HOURS
Qty: 0 | Refills: 0 | Status: DISCONTINUED | OUTPATIENT
Start: 2017-01-25 | End: 2017-01-26

## 2017-01-25 RX ORDER — KETOROLAC TROMETHAMINE 30 MG/ML
15 SYRINGE (ML) INJECTION EVERY 8 HOURS
Qty: 0 | Refills: 0 | Status: DISCONTINUED | OUTPATIENT
Start: 2017-01-25 | End: 2017-01-26

## 2017-01-25 RX ORDER — TRAMADOL HYDROCHLORIDE 50 MG/1
50 TABLET ORAL THREE TIMES A DAY
Qty: 0 | Refills: 0 | Status: DISCONTINUED | OUTPATIENT
Start: 2017-01-25 | End: 2017-01-26

## 2017-01-25 RX ORDER — SENNA PLUS 8.6 MG/1
2 TABLET ORAL AT BEDTIME
Qty: 0 | Refills: 0 | Status: DISCONTINUED | OUTPATIENT
Start: 2017-01-25 | End: 2017-01-26

## 2017-01-25 RX ADMIN — TRAMADOL HYDROCHLORIDE 50 MILLIGRAM(S): 50 TABLET ORAL at 04:44

## 2017-01-25 RX ADMIN — Medication 650 MILLIGRAM(S): at 15:36

## 2017-01-25 RX ADMIN — Medication 1 TABLET(S): at 12:40

## 2017-01-25 RX ADMIN — TRAMADOL HYDROCHLORIDE 50 MILLIGRAM(S): 50 TABLET ORAL at 05:40

## 2017-01-25 RX ADMIN — SIMVASTATIN 10 MILLIGRAM(S): 20 TABLET, FILM COATED ORAL at 21:21

## 2017-01-25 RX ADMIN — SODIUM CHLORIDE 3 MILLILITER(S): 9 INJECTION INTRAMUSCULAR; INTRAVENOUS; SUBCUTANEOUS at 13:56

## 2017-01-25 RX ADMIN — Medication 650 MILLIGRAM(S): at 00:42

## 2017-01-25 RX ADMIN — Medication 100 MILLIGRAM(S): at 05:29

## 2017-01-25 RX ADMIN — Medication 88 MICROGRAM(S): at 05:29

## 2017-01-25 RX ADMIN — SODIUM CHLORIDE 3 MILLILITER(S): 9 INJECTION INTRAMUSCULAR; INTRAVENOUS; SUBCUTANEOUS at 05:19

## 2017-01-25 RX ADMIN — Medication 650 MILLIGRAM(S): at 09:21

## 2017-01-25 RX ADMIN — Medication 500 MILLIGRAM(S): at 18:10

## 2017-01-25 RX ADMIN — Medication 325 MILLIGRAM(S): at 12:40

## 2017-01-25 RX ADMIN — Medication 1 TABLET(S): at 18:10

## 2017-01-25 RX ADMIN — Medication 650 MILLIGRAM(S): at 10:20

## 2017-01-25 RX ADMIN — TRAMADOL HYDROCHLORIDE 50 MILLIGRAM(S): 50 TABLET ORAL at 21:21

## 2017-01-25 RX ADMIN — Medication 650 MILLIGRAM(S): at 16:30

## 2017-01-25 RX ADMIN — SODIUM CHLORIDE 3 MILLILITER(S): 9 INJECTION INTRAMUSCULAR; INTRAVENOUS; SUBCUTANEOUS at 21:10

## 2017-01-26 VITALS
OXYGEN SATURATION: 99 % | HEART RATE: 88 BPM | TEMPERATURE: 99 F | DIASTOLIC BLOOD PRESSURE: 64 MMHG | RESPIRATION RATE: 18 BRPM | SYSTOLIC BLOOD PRESSURE: 127 MMHG

## 2017-01-26 RX ORDER — TRAMADOL HYDROCHLORIDE 50 MG/1
1 TABLET ORAL
Qty: 0 | Refills: 0 | COMMUNITY
Start: 2017-01-26

## 2017-01-26 RX ORDER — SENNA PLUS 8.6 MG/1
2 TABLET ORAL
Qty: 0 | Refills: 0 | COMMUNITY
Start: 2017-01-26

## 2017-01-26 RX ORDER — POLYETHYLENE GLYCOL 3350 17 G/17G
17 POWDER, FOR SOLUTION ORAL
Qty: 0 | Refills: 0 | COMMUNITY
Start: 2017-01-26

## 2017-01-26 RX ORDER — ALBUTEROL 90 UG/1
2 AEROSOL, METERED ORAL
Qty: 0 | Refills: 0 | COMMUNITY

## 2017-01-26 RX ORDER — LEVOTHYROXINE SODIUM 125 MCG
1 TABLET ORAL
Qty: 0 | Refills: 0 | COMMUNITY
Start: 2017-01-26

## 2017-01-26 RX ORDER — OXYCODONE HYDROCHLORIDE 5 MG/1
1 TABLET ORAL
Qty: 0 | Refills: 0 | COMMUNITY
Start: 2017-01-26

## 2017-01-26 RX ORDER — VALSARTAN 80 MG/1
1 TABLET ORAL
Qty: 0 | Refills: 0 | COMMUNITY
Start: 2017-01-26

## 2017-01-26 RX ORDER — ALBUTEROL 90 UG/1
2 AEROSOL, METERED ORAL
Qty: 0 | Refills: 0 | COMMUNITY
Start: 2017-01-26

## 2017-01-26 RX ORDER — ASCORBIC ACID 60 MG
1 TABLET,CHEWABLE ORAL
Qty: 0 | Refills: 0 | COMMUNITY
Start: 2017-01-26

## 2017-01-26 RX ORDER — ASPIRIN/CALCIUM CARB/MAGNESIUM 324 MG
1 TABLET ORAL
Qty: 0 | Refills: 0 | COMMUNITY
Start: 2017-01-26

## 2017-01-26 RX ORDER — ASPIRIN/CALCIUM CARB/MAGNESIUM 324 MG
1 TABLET ORAL
Qty: 0 | Refills: 0 | COMMUNITY

## 2017-01-26 RX ORDER — DOCUSATE SODIUM 100 MG
1 CAPSULE ORAL
Qty: 0 | Refills: 0 | COMMUNITY
Start: 2017-01-26

## 2017-01-26 RX ORDER — SIMVASTATIN 20 MG/1
1 TABLET, FILM COATED ORAL
Qty: 0 | Refills: 0 | COMMUNITY
Start: 2017-01-26

## 2017-01-26 RX ADMIN — VALSARTAN 160 MILLIGRAM(S): 80 TABLET ORAL at 06:04

## 2017-01-26 RX ADMIN — Medication 325 MILLIGRAM(S): at 13:19

## 2017-01-26 RX ADMIN — Medication 1 TABLET(S): at 06:04

## 2017-01-26 RX ADMIN — Medication 650 MILLIGRAM(S): at 13:19

## 2017-01-26 RX ADMIN — Medication 650 MILLIGRAM(S): at 06:04

## 2017-01-26 RX ADMIN — SODIUM CHLORIDE 3 MILLILITER(S): 9 INJECTION INTRAMUSCULAR; INTRAVENOUS; SUBCUTANEOUS at 05:26

## 2017-01-26 RX ADMIN — Medication 500 MILLIGRAM(S): at 06:04

## 2017-01-26 RX ADMIN — Medication 1 TABLET(S): at 13:19

## 2017-01-26 RX ADMIN — Medication 88 MICROGRAM(S): at 06:04

## 2017-01-26 RX ADMIN — Medication 650 MILLIGRAM(S): at 00:14

## 2017-02-02 PROBLEM — H26.9 UNSPECIFIED CATARACT: Chronic | Status: ACTIVE | Noted: 2017-01-10

## 2017-02-02 PROBLEM — E78.5 HYPERLIPIDEMIA, UNSPECIFIED: Chronic | Status: ACTIVE | Noted: 2017-01-10

## 2017-02-02 PROBLEM — M75.122 COMPLETE ROTATOR CUFF TEAR OR RUPTURE OF LEFT SHOULDER, NOT SPECIFIED AS TRAUMATIC: Chronic | Status: ACTIVE | Noted: 2017-01-10

## 2017-02-02 LAB — SURGICAL PATHOLOGY STUDY: SIGNIFICANT CHANGE UP

## 2017-06-06 ENCOUNTER — LABORATORY RESULT (OUTPATIENT)
Age: 82
End: 2017-06-06

## 2017-06-06 ENCOUNTER — APPOINTMENT (OUTPATIENT)
Dept: GASTROENTEROLOGY | Facility: CLINIC | Age: 82
End: 2017-06-06

## 2017-06-06 VITALS
HEART RATE: 72 BPM | TEMPERATURE: 98.6 F | HEIGHT: 60 IN | WEIGHT: 172 LBS | BODY MASS INDEX: 33.77 KG/M2 | SYSTOLIC BLOOD PRESSURE: 130 MMHG | DIASTOLIC BLOOD PRESSURE: 88 MMHG | OXYGEN SATURATION: 97 %

## 2017-06-06 DIAGNOSIS — Z86.79 PERSONAL HISTORY OF OTHER DISEASES OF THE CIRCULATORY SYSTEM: ICD-10-CM

## 2017-06-06 DIAGNOSIS — R15.9 FULL INCONTINENCE OF FECES: ICD-10-CM

## 2017-06-06 DIAGNOSIS — E78.00 PURE HYPERCHOLESTEROLEMIA, UNSPECIFIED: ICD-10-CM

## 2017-06-06 DIAGNOSIS — R19.7 DIARRHEA, UNSPECIFIED: ICD-10-CM

## 2017-06-06 DIAGNOSIS — Z82.49 FAMILY HISTORY OF ISCHEMIC HEART DISEASE AND OTHER DISEASES OF THE CIRCULATORY SYSTEM: ICD-10-CM

## 2017-06-06 DIAGNOSIS — K58.9 IRRITABLE BOWEL SYNDROME W/OUT DIARRHEA: ICD-10-CM

## 2017-06-06 DIAGNOSIS — Z82.5 FAMILY HISTORY OF ASTHMA AND OTHER CHRONIC LOWER RESPIRATORY DISEASES: ICD-10-CM

## 2017-06-06 DIAGNOSIS — Z86.39 PERSONAL HISTORY OF OTHER ENDOCRINE, NUTRITIONAL AND METABOLIC DISEASE: ICD-10-CM

## 2017-06-06 DIAGNOSIS — Z83.79 FAMILY HISTORY OF OTHER DISEASES OF THE DIGESTIVE SYSTEM: ICD-10-CM

## 2017-06-06 DIAGNOSIS — C21.0 MALIGNANT NEOPLASM OF ANUS, UNSPECIFIED: ICD-10-CM

## 2017-06-06 DIAGNOSIS — K57.92 DIVERTICULITIS OF INTESTINE, PART UNSPECIFIED, W/OUT PERFORATION OR ABSCESS W/OUT BLEEDING: ICD-10-CM

## 2017-06-06 DIAGNOSIS — R10.9 UNSPECIFIED ABDOMINAL PAIN: ICD-10-CM

## 2017-06-06 RX ORDER — LEVOTHYROXINE SODIUM 0.09 MG/1
88 TABLET ORAL
Refills: 0 | Status: ACTIVE | COMMUNITY

## 2017-06-06 RX ORDER — ACETAMINOPHEN 500 MG
TABLET ORAL
Refills: 0 | Status: ACTIVE | COMMUNITY

## 2017-06-06 RX ORDER — GLUC/MSM/COLGN2/HYAL/ANTIARTH3 375-375-20
TABLET ORAL
Refills: 0 | Status: ACTIVE | COMMUNITY

## 2017-06-06 RX ORDER — CHROMIUM 200 MCG
TABLET ORAL
Refills: 0 | Status: ACTIVE | COMMUNITY

## 2017-06-06 RX ORDER — CALCIUM CARBONATE/VITAMIN D3 600MG-5MCG
TABLET ORAL
Refills: 0 | Status: ACTIVE | COMMUNITY

## 2017-06-06 RX ORDER — SIMVASTATIN 10 MG/1
10 TABLET, FILM COATED ORAL
Refills: 0 | Status: ACTIVE | COMMUNITY

## 2017-06-06 RX ORDER — BIOTIN 10 MG
TABLET ORAL
Refills: 0 | Status: ACTIVE | COMMUNITY

## 2017-06-06 RX ORDER — VALSARTAN 160 MG/1
160 TABLET, COATED ORAL
Refills: 0 | Status: ACTIVE | COMMUNITY

## 2017-06-06 RX ORDER — ASPIRIN 325 MG/1
325 TABLET, FILM COATED ORAL
Refills: 0 | Status: ACTIVE | COMMUNITY

## 2017-06-07 ENCOUNTER — LABORATORY RESULT (OUTPATIENT)
Age: 82
End: 2017-06-07

## 2017-06-07 ENCOUNTER — RX RENEWAL (OUTPATIENT)
Age: 82
End: 2017-06-07

## 2017-06-07 RX ORDER — RIFAXIMIN 550 MG/1
550 TABLET ORAL 3 TIMES DAILY
Qty: 42 | Refills: 1 | Status: ACTIVE | COMMUNITY
Start: 2017-06-06 | End: 1900-01-01

## 2018-04-15 ENCOUNTER — INPATIENT (INPATIENT)
Facility: HOSPITAL | Age: 83
LOS: 11 days | Discharge: HOME | DRG: 435 | End: 2018-04-27
Attending: GENERAL ACUTE CARE HOSPITAL | Admitting: GENERAL ACUTE CARE HOSPITAL
Payer: MEDICARE

## 2018-04-15 VITALS
RESPIRATION RATE: 18 BRPM | WEIGHT: 162.92 LBS | HEART RATE: 73 BPM | DIASTOLIC BLOOD PRESSURE: 72 MMHG | OXYGEN SATURATION: 97 % | SYSTOLIC BLOOD PRESSURE: 126 MMHG | TEMPERATURE: 98 F

## 2018-04-15 DIAGNOSIS — R11.2 NAUSEA WITH VOMITING, UNSPECIFIED: ICD-10-CM

## 2018-04-15 DIAGNOSIS — Z90.710 ACQUIRED ABSENCE OF BOTH CERVIX AND UTERUS: Chronic | ICD-10-CM

## 2018-04-15 DIAGNOSIS — I10 ESSENTIAL (PRIMARY) HYPERTENSION: ICD-10-CM

## 2018-04-15 DIAGNOSIS — Z98.890 OTHER SPECIFIED POSTPROCEDURAL STATES: Chronic | ICD-10-CM

## 2018-04-15 DIAGNOSIS — C25.9 MALIGNANT NEOPLASM OF PANCREAS, UNSPECIFIED: ICD-10-CM

## 2018-04-15 DIAGNOSIS — E03.9 HYPOTHYROIDISM, UNSPECIFIED: ICD-10-CM

## 2018-04-15 DIAGNOSIS — Z75.8 OTHER PROBLEMS RELATED TO MEDICAL FACILITIES AND OTHER HEALTH CARE: ICD-10-CM

## 2018-04-15 LAB
ALBUMIN SERPL ELPH-MCNC: 3.5 G/DL — SIGNIFICANT CHANGE UP (ref 3.3–5)
ALBUMIN SERPL ELPH-MCNC: 3.6 G/DL — SIGNIFICANT CHANGE UP (ref 3.3–5)
ALP SERPL-CCNC: 565 U/L — HIGH (ref 40–120)
ALP SERPL-CCNC: 611 U/L — HIGH (ref 40–120)
ALT FLD-CCNC: 74 U/L RC — HIGH (ref 10–45)
ALT FLD-CCNC: 82 U/L RC — HIGH (ref 10–45)
ANION GAP SERPL CALC-SCNC: 13 MMOL/L — SIGNIFICANT CHANGE UP (ref 5–17)
ANION GAP SERPL CALC-SCNC: 14 MMOL/L — SIGNIFICANT CHANGE UP (ref 5–17)
APPEARANCE UR: CLEAR — SIGNIFICANT CHANGE UP
APTT BLD: 30.9 SEC — SIGNIFICANT CHANGE UP (ref 27.5–37.4)
AST SERPL-CCNC: 108 U/L — HIGH (ref 10–40)
AST SERPL-CCNC: 83 U/L — HIGH (ref 10–40)
BASOPHILS # BLD AUTO: 0 K/UL — SIGNIFICANT CHANGE UP (ref 0–0.2)
BASOPHILS NFR BLD AUTO: 0.5 % — SIGNIFICANT CHANGE UP (ref 0–2)
BILIRUB SERPL-MCNC: 0.6 MG/DL — SIGNIFICANT CHANGE UP (ref 0.2–1.2)
BILIRUB SERPL-MCNC: 0.8 MG/DL — SIGNIFICANT CHANGE UP (ref 0.2–1.2)
BILIRUB UR-MCNC: NEGATIVE — SIGNIFICANT CHANGE UP
BUN SERPL-MCNC: 10 MG/DL — SIGNIFICANT CHANGE UP (ref 7–23)
BUN SERPL-MCNC: 9 MG/DL — SIGNIFICANT CHANGE UP (ref 7–23)
CALCIUM SERPL-MCNC: 8.7 MG/DL — SIGNIFICANT CHANGE UP (ref 8.4–10.5)
CALCIUM SERPL-MCNC: 9.2 MG/DL — SIGNIFICANT CHANGE UP (ref 8.4–10.5)
CHLORIDE SERPL-SCNC: 101 MMOL/L — SIGNIFICANT CHANGE UP (ref 96–108)
CHLORIDE SERPL-SCNC: 103 MMOL/L — SIGNIFICANT CHANGE UP (ref 96–108)
CO2 SERPL-SCNC: 23 MMOL/L — SIGNIFICANT CHANGE UP (ref 22–31)
CO2 SERPL-SCNC: 23 MMOL/L — SIGNIFICANT CHANGE UP (ref 22–31)
COLOR SPEC: COLORLESS — SIGNIFICANT CHANGE UP
CREAT SERPL-MCNC: 1.09 MG/DL — SIGNIFICANT CHANGE UP (ref 0.5–1.3)
CREAT SERPL-MCNC: 1.09 MG/DL — SIGNIFICANT CHANGE UP (ref 0.5–1.3)
DIFF PNL FLD: NEGATIVE — SIGNIFICANT CHANGE UP
EOSINOPHIL # BLD AUTO: 0.1 K/UL — SIGNIFICANT CHANGE UP (ref 0–0.5)
EOSINOPHIL NFR BLD AUTO: 1 % — SIGNIFICANT CHANGE UP (ref 0–6)
GAS PNL BLDV: SIGNIFICANT CHANGE UP
GLUCOSE SERPL-MCNC: 110 MG/DL — HIGH (ref 70–99)
GLUCOSE SERPL-MCNC: 116 MG/DL — HIGH (ref 70–99)
GLUCOSE UR QL: NEGATIVE — SIGNIFICANT CHANGE UP
HCT VFR BLD CALC: 27.8 % — LOW (ref 34.5–45)
HGB BLD-MCNC: 9.2 G/DL — LOW (ref 11.5–15.5)
INR BLD: 1.15 RATIO — SIGNIFICANT CHANGE UP (ref 0.88–1.16)
KETONES UR-MCNC: NEGATIVE — SIGNIFICANT CHANGE UP
LEUKOCYTE ESTERASE UR-ACNC: NEGATIVE — SIGNIFICANT CHANGE UP
LIDOCAIN IGE QN: 34 U/L — SIGNIFICANT CHANGE UP (ref 7–60)
LYMPHOCYTES # BLD AUTO: 0.6 K/UL — LOW (ref 1–3.3)
LYMPHOCYTES # BLD AUTO: 8.1 % — LOW (ref 13–44)
MAGNESIUM SERPL-MCNC: 1.9 MG/DL — SIGNIFICANT CHANGE UP (ref 1.6–2.6)
MCHC RBC-ENTMCNC: 31.9 PG — SIGNIFICANT CHANGE UP (ref 27–34)
MCHC RBC-ENTMCNC: 33 GM/DL — SIGNIFICANT CHANGE UP (ref 32–36)
MCV RBC AUTO: 96.9 FL — SIGNIFICANT CHANGE UP (ref 80–100)
MONOCYTES # BLD AUTO: 0.5 K/UL — SIGNIFICANT CHANGE UP (ref 0–0.9)
MONOCYTES NFR BLD AUTO: 6.2 % — SIGNIFICANT CHANGE UP (ref 2–14)
NEUTROPHILS # BLD AUTO: 6.6 K/UL — SIGNIFICANT CHANGE UP (ref 1.8–7.4)
NEUTROPHILS NFR BLD AUTO: 84.3 % — HIGH (ref 43–77)
NITRITE UR-MCNC: NEGATIVE — SIGNIFICANT CHANGE UP
PH UR: 7 — SIGNIFICANT CHANGE UP (ref 5–8)
PHOSPHATE SERPL-MCNC: 2.5 MG/DL — SIGNIFICANT CHANGE UP (ref 2.5–4.5)
PLATELET # BLD AUTO: 132 K/UL — LOW (ref 150–400)
POTASSIUM SERPL-MCNC: 3.8 MMOL/L — SIGNIFICANT CHANGE UP (ref 3.5–5.3)
POTASSIUM SERPL-MCNC: 4.4 MMOL/L — SIGNIFICANT CHANGE UP (ref 3.5–5.3)
POTASSIUM SERPL-SCNC: 3.8 MMOL/L — SIGNIFICANT CHANGE UP (ref 3.5–5.3)
POTASSIUM SERPL-SCNC: 4.4 MMOL/L — SIGNIFICANT CHANGE UP (ref 3.5–5.3)
PROT SERPL-MCNC: 6.3 G/DL — SIGNIFICANT CHANGE UP (ref 6–8.3)
PROT SERPL-MCNC: 6.3 G/DL — SIGNIFICANT CHANGE UP (ref 6–8.3)
PROT UR-MCNC: NEGATIVE — SIGNIFICANT CHANGE UP
PROTHROM AB SERPL-ACNC: 12.5 SEC — SIGNIFICANT CHANGE UP (ref 9.8–12.7)
RBC # BLD: 2.86 M/UL — LOW (ref 3.8–5.2)
RBC # FLD: 14 % — SIGNIFICANT CHANGE UP (ref 10.3–14.5)
SODIUM SERPL-SCNC: 138 MMOL/L — SIGNIFICANT CHANGE UP (ref 135–145)
SODIUM SERPL-SCNC: 139 MMOL/L — SIGNIFICANT CHANGE UP (ref 135–145)
SP GR SPEC: 1.02 — SIGNIFICANT CHANGE UP (ref 1.01–1.02)
UROBILINOGEN FLD QL: NEGATIVE — SIGNIFICANT CHANGE UP
WBC # BLD: 7.9 K/UL — SIGNIFICANT CHANGE UP (ref 3.8–10.5)
WBC # FLD AUTO: 7.9 K/UL — SIGNIFICANT CHANGE UP (ref 3.8–10.5)
WBC UR QL: SIGNIFICANT CHANGE UP /HPF (ref 0–5)

## 2018-04-15 PROCEDURE — 99285 EMERGENCY DEPT VISIT HI MDM: CPT

## 2018-04-15 PROCEDURE — 76705 ECHO EXAM OF ABDOMEN: CPT | Mod: 26

## 2018-04-15 PROCEDURE — 99223 1ST HOSP IP/OBS HIGH 75: CPT | Mod: AI

## 2018-04-15 PROCEDURE — 74177 CT ABD & PELVIS W/CONTRAST: CPT | Mod: 26

## 2018-04-15 RX ORDER — HEPARIN SODIUM 5000 [USP'U]/ML
6500 INJECTION INTRAVENOUS; SUBCUTANEOUS ONCE
Qty: 0 | Refills: 0 | Status: COMPLETED | OUTPATIENT
Start: 2018-04-15 | End: 2018-04-16

## 2018-04-15 RX ORDER — SODIUM CHLORIDE 9 MG/ML
1000 INJECTION INTRAMUSCULAR; INTRAVENOUS; SUBCUTANEOUS ONCE
Qty: 0 | Refills: 0 | Status: COMPLETED | OUTPATIENT
Start: 2018-04-15 | End: 2018-04-15

## 2018-04-15 RX ORDER — HEPARIN SODIUM 5000 [USP'U]/ML
5000 INJECTION INTRAVENOUS; SUBCUTANEOUS EVERY 8 HOURS
Qty: 0 | Refills: 0 | Status: DISCONTINUED | OUTPATIENT
Start: 2018-04-15 | End: 2018-04-15

## 2018-04-15 RX ORDER — HEPARIN SODIUM 5000 [USP'U]/ML
INJECTION INTRAVENOUS; SUBCUTANEOUS
Qty: 25000 | Refills: 0 | Status: DISCONTINUED | OUTPATIENT
Start: 2018-04-15 | End: 2018-04-16

## 2018-04-15 RX ORDER — ONDANSETRON 8 MG/1
4 TABLET, FILM COATED ORAL ONCE
Qty: 0 | Refills: 0 | Status: COMPLETED | OUTPATIENT
Start: 2018-04-15 | End: 2018-04-15

## 2018-04-15 RX ORDER — ACETAMINOPHEN 500 MG
1000 TABLET ORAL ONCE
Qty: 0 | Refills: 0 | Status: COMPLETED | OUTPATIENT
Start: 2018-04-15 | End: 2018-04-15

## 2018-04-15 RX ADMIN — SODIUM CHLORIDE 1000 MILLILITER(S): 9 INJECTION INTRAMUSCULAR; INTRAVENOUS; SUBCUTANEOUS at 21:08

## 2018-04-15 RX ADMIN — SODIUM CHLORIDE 1000 MILLILITER(S): 9 INJECTION INTRAMUSCULAR; INTRAVENOUS; SUBCUTANEOUS at 18:21

## 2018-04-15 RX ADMIN — ONDANSETRON 4 MILLIGRAM(S): 8 TABLET, FILM COATED ORAL at 18:21

## 2018-04-15 RX ADMIN — SODIUM CHLORIDE 333.33 MILLILITER(S): 9 INJECTION INTRAMUSCULAR; INTRAVENOUS; SUBCUTANEOUS at 22:25

## 2018-04-15 RX ADMIN — Medication 400 MILLIGRAM(S): at 19:11

## 2018-04-15 RX ADMIN — Medication 1000 MILLIGRAM(S): at 19:11

## 2018-04-15 NOTE — ED PROVIDER NOTE - SECONDARY DIAGNOSIS.
Pancreatic mass Non-intractable vomiting with nausea, unspecified vomiting type Portal vein thrombosis

## 2018-04-15 NOTE — H&P ADULT - PROBLEM SELECTOR PLAN 6
[facility] [phone] [facility] [phone]  - Family and patient unaware of medications and dosages  - Likely on Simvastatin, 325mg Aspirin, Valsartan, and Levothyroxine in addition to multivitamins

## 2018-04-15 NOTE — ED PROCEDURE NOTE - PROCEDURE ADDITIONAL DETAILS
POCUS: Emergency Department Focused Ultrasound performed at patient's bedside.  The complete report will be available in PACS.
POCUS: Emergency Department Focused Ultrasound performed at patient's bedside.  The complete report will be available in PACS.

## 2018-04-15 NOTE — H&P ADULT - ASSESSMENT
Angeline Boles is an 82 year old female with a history of hypertension, asthma, anal cancer s/p radiation and chemotherapy, recently diagnosed pancreatic cancer pending start of chemotherapy at [facility] next week presents for evaluation of persistent nausea and vomiting admitted for inability to tolerate PO.

## 2018-04-15 NOTE — ED PROVIDER NOTE - OBJECTIVE STATEMENT
81 y/o F pmhx htn, hld, hypothyroid, diagnosed with pancreatic CA x3 weeks ago in [place] presenting with persistent nausea and inability to tolerate PO since Friday. Pt states that while in [place] she was having persistent nausea and abdominal pain, went to ER there and was diagnosed with pancreatic CA with CT scan and is scheduled to start chemo next week at [facility] next week. Pt states that she has vomited almost every time she tries to take her medication, eat or drink anything since Friday and has not had a 'true bowel movement' since Thursday. She states that her abdominal pain is crampy in nature and intermittent. Denies any fevers, chills, chest pain, shortness of breath or urinary symptoms. 81 y/o F pmhx htn, hld, hypothyroid, diagnosed with pancreatic CA x3 weeks ago in [place] presenting with persistent nausea and inability to tolerate PO since Friday. Pt states that while in [place] she was having persistent nausea and abdominal pain, went to ER there and was diagnosed with pancreatic CA with CT scan and is scheduled to start chemo next week at [facility] next week. Pt states that she has vomited almost every time she tries to take her medication, eat or drink anything since Friday and has not had a 'true bowel movement' since Thursday. She states that her abdominal pain is crampy in nature and intermittent. Denies any fevers, chills, chest pain, shortness of breath or urinary symptoms.  PMD: Marquis King

## 2018-04-15 NOTE — H&P ADULT - PROBLEM SELECTOR PLAN 3
Patient reports having initial evaluation for treatment at [facility] and planning for initiation of treatment within the coming weeks  - Continue Oncology consult regarding management, anticoagulation as per family request

## 2018-04-15 NOTE — H&P ADULT - PROBLEM SELECTOR PLAN 7
F: s/p 3L NS  E: replete prn  N: CLD, advance as tolerated  GI: H2 blockers  Code: FULL F: s/p 3L NS  E: replete prn  N: CLD, advance as tolerated  GI: PPI  Code: FULL

## 2018-04-15 NOTE — ED ADULT NURSE NOTE - OBJECTIVE STATEMENT
pt recent dx with pancreatic cancer supposed to start chemo at [facility] Pt has not had abd since Thursday and has been nausea not eating for drinking for couple of days Pt c/o of lower back pain no abd pian on palpation.  IVL placed and meds and fluids given as ordered Krupaorn

## 2018-04-15 NOTE — H&P ADULT - PROBLEM SELECTOR PLAN 1
Patient presents with episodes of abdominal pain and intermittent episodes of nausea and emesis over the past three days. Here, labs within normal limits but CT scan showing evidence of a portal vein thrombosis with an additional SMV clot. Likely in setting of malignancy and source of symptoms. Started on heparin gtt for anticoagulation.  - Started on heparin gtt in ED after discussion with Dr. Augustin  - Will likely need to be transitioned to Lovenox in setting of active malignancy for prolonged anticoagulation  - Discussed findings with patient and daughters (Cheryl and Belle) overnight

## 2018-04-15 NOTE — H&P ADULT - PROBLEM SELECTOR PLAN 2
- Continue zofran as needed for nausea, add famotidine IV for dyspepsia as unable to tolerate PO  - Clear liquid diet and advance as tolerated  - Consider nutrition consult if continues to have limited PO intake - Continue zofran as needed for nausea, add PPI IV for dyspepsia as unable to tolerate PO  - Clear liquid diet and advance as tolerated  - Consider nutrition consult if continues to have limited PO intake

## 2018-04-15 NOTE — H&P ADULT - NSHPPHYSICALEXAM_GEN_ALL_CORE
PHYSICAL EXAM:  GENERAL: NAD, well-groomed, well-developed  HEAD:  Atraumatic, Normocephalic  EYES: EOMI, PERRLA, conjunctiva and sclera clear  ENMT: No tonsillar erythema, exudates, or enlargement; Moist mucous membranes,   NECK: Supple, No JVD, Normal thyroid  HEART: Regular rate and rhythm; No murmurs, rubs, or gallops  RESPIRATORY: CTA B/L, No W/R/R  ABDOMEN: Soft, Nontender, Nondistended; Bowel sounds present  NEUROLOGY: A&Ox3, nonfocal, CN II-XII grossly intact, sensation intact, no gait abnormalities bilaterally;  EXTREMITIES:  2+ Peripheral Pulses, No clubbing, cyanosis, or edema  SKIN: warm, dry, normal color, no rash or abnormal lesions  MSK: No joint effusion  PSYCH: Flat affect PHYSICAL EXAM:  GENERAL: NAD, well-groomed, well-developed  HEAD:  Atraumatic, Normocephalic  EYES: EOMI, PERRLA, conjunctiva and sclera clear  ENMT: No tonsillar erythema, exudates, or enlargement; Moist mucous membranes,   NECK: Supple, No JVD, Normal thyroid  HEART: irregular rate and rhythm; 3/6 systolic murmur   RESPIRATORY: CTA B/L, No W/R/R  ABDOMEN: Soft, Nontender, Nondistended; Bowel sounds present  NEUROLOGY: A&Ox3, nonfocal, CN II-XII grossly intact, sensation intact  EXTREMITIES:  2+ Peripheral Pulses, No clubbing, cyanosis, or edema  SKIN: warm, dry, normal color, no rash or abnormal lesions  MSK: No joint effusion  PSYCH: Flat affect

## 2018-04-15 NOTE — ED PROVIDER NOTE - CARE PLAN
Principal Discharge DX:	Nausea and vomiting  Secondary Diagnosis:	Pancreatic mass Principal Discharge DX:	Malignant neoplasm of pancreas, unspecified location of malignancy  Secondary Diagnosis:	Non-intractable vomiting with nausea, unspecified vomiting type  Secondary Diagnosis:	Portal vein thrombosis

## 2018-04-15 NOTE — H&P ADULT - NSHPREVIEWOFSYSTEMS_GEN_ALL_CORE
REVIEW OF SYSTEMS:    CONSTITUTIONAL: No weakness, fevers or chills  EYES/ENT: No visual changes;  No vertigo or throat pain   NECK: No pain or stiffness  RESPIRATORY: No cough, wheezing, hemoptysis; No shortness of breath  CARDIOVASCULAR: No chest pain or palpitations  GASTROINTESTINAL: No abdominal or epigastric pain. No nausea, vomiting, or hematemesis; No diarrhea or constipation. No melena or hematochezia.  GENITOURINARY: No dysuria, frequency or hematuria  NEUROLOGICAL: No numbness or weakness  SKIN: No itching, burning, rashes, or lesions   PSYCH: No depression or anxiety  HEME: No swollen lymph nodes REVIEW OF SYSTEMS:    CONSTITUTIONAL: No weakness, fevers or chills  EYES/ENT: No visual changes;  No vertigo or throat pain   NECK: No pain or stiffness  RESPIRATORY: No cough, wheezing, hemoptysis; No shortness of breath  CARDIOVASCULAR: No chest pain or palpitations  GASTROINTESTINAL: + epigastric pain. +nausea, +vomiting, no hematemesis; +constipation. No melena or hematochezia.  GENITOURINARY: No dysuria, frequency or hematuria  NEUROLOGICAL: No numbness or weakness  SKIN: No itching, burning, rashes, or lesions   PSYCH: No depression or anxiety  HEME: No swollen lymph nodes

## 2018-04-15 NOTE — ED PROVIDER NOTE - MEDICAL DECISION MAKING DETAILS
83 y/o F new diagnosis of pancreatic CA x3 weeks ago pending start of chemo at [facility] next week p/w persistent nausea and inability to tolerate PO causing vomiting each time even with meds, constipation since Friday. PE remarkable only for mild abdominal tenderness, normal bowel sounds. m/p bowel obstruction. will provide fluids, pain control and anti emetics, labs, ua and ct a/p reassess 81 y/o F new diagnosis of pancreatic CA x3 weeks ago pending start of chemo at [facility] next week p/w persistent nausea and inability to tolerate PO causing vomiting each time even with meds, constipation since Friday. PE remarkable only for mild abdominal tenderness, normal bowel sounds. m/p bowel obstruction. will provide fluids, pain control and anti emetics, labs, ua and ct a/p reassess  Attending Tejas: 81 y/o female recently diagnosed with pancreatic cancer presenting with abdominal pain with n/v. upon arrival hemodynamically stable. pt with ttp epigastric area. pocus shows distended gallbladder without sonographic murphys making acute cholecystitis less likely. will obtain CT abd/pel to further evaluate, consider HIDA scan. no sob currently or pleuritc pain to suggest pe

## 2018-04-15 NOTE — ED PROVIDER NOTE - NS ED ROS FT
Constitutional: No fever or chills  Eyes: No visual changes, eye pain or redness  HEENT: No throat pain, ear pain, nasal pain. No nose bleeding.  CV: No chest pain or lower extremity edema  Resp: No SOB no cough  GI: +abdominal pain, nausea, vomiting, constipation.  : No dysuria, hematuria.   MSK: No musculoskeletal pain  Skin: No rash  Neuro: No headache. No numbness or tingling. No weakness.

## 2018-04-15 NOTE — H&P ADULT - PROBLEM SELECTOR PROBLEM 2
Malignant neoplasm of pancreas, unspecified location of malignancy Non-intractable vomiting with nausea, unspecified vomiting type

## 2018-04-15 NOTE — ED PROVIDER NOTE - PROGRESS NOTE DETAILS
daughter Belle: [phone] Attending Tejas: ct read showing gallbladder obstruction and portal vein thrombosis. d/w dr gunn. will start anticoagulation after pt weighed. no headache or symptosm to suggest mets to brain

## 2018-04-15 NOTE — ED ADULT NURSE REASSESSMENT NOTE - NS ED NURSE REASSESS COMMENT FT1
Brooke2 pt pending ct scan and results Asif
2112 second bolus given as ordered and pending ct scan and results Dread
2154 pt returned form CT scan pending results kimberli
2211 repeat labs drawn as ordered pending ct result Asif

## 2018-04-15 NOTE — H&P ADULT - NSHPLABSRESULTS_GEN_ALL_CORE
9.2    7.9   )-----------( 132      ( 15 Apr 2018 19:11 )             27.8       -15    139  |  103  |  9   ----------------------------<  110<H>  4.4   |  23  |  1.09    Ca    8.7      15 Apr 2018 22:28  Phos  2.5     -15  Mg     1.9     -15    TPro  6.3  /  Alb  3.6  /  TBili  0.8  /  DBili  x   /  AST  83<H>  /  ALT  74<H>  /  AlkPhos  565<H>  04-15              Urinalysis Basic - ( 15 Apr 2018 22:42 )    Color: Colorless / Appearance: Clear / S.018 / pH: x  Gluc: x / Ketone: Negative  / Bili: Negative / Urobili: Negative   Blood: x / Protein: Negative / Nitrite: Negative   Leuk Esterase: Negative / RBC: x / WBC 3-5 /HPF   Sq Epi: x / Non Sq Epi: x / Bacteria: x        PT/INR - ( 15 Apr 2018 19:11 )   PT: 12.5 sec;   INR: 1.15 ratio         PTT - ( 15 Apr 2018 19:11 )  PTT:30.9 sec    Lactate Trend        I personally interpreted this patient's labs. They were notable for no leukocytosis with WBC 7.9, hemoglobin 9.2 (at baseline), and platelets 132. Metabolic panel unremarkable with Cr 1.09. LFTs with , AST 83, ALT 74 with normal Tbili (and downtrending on repeat labs). Mag and lipase normal. VBG unremarkable with lactate 1.7. UA negative.  I personally interpreted this patient's imaging. CTAP was notable for  I personally interpreted this patient's ECG. It showed 9.2    7.9   )-----------( 132      ( 15 Apr 2018 19:11 )             27.8       -15    139  |  103  |  9   ----------------------------<  110<H>  4.4   |  23  |  1.09    Ca    8.7      15 Apr 2018 22:28  Phos  2.5     -15  Mg     1.9     -15    TPro  6.3  /  Alb  3.6  /  TBili  0.8  /  DBili  x   /  AST  83<H>  /  ALT  74<H>  /  AlkPhos  565<H>  -15              Urinalysis Basic - ( 15 Apr 2018 22:42 )    Color: Colorless / Appearance: Clear / S.018 / pH: x  Gluc: x / Ketone: Negative  / Bili: Negative / Urobili: Negative   Blood: x / Protein: Negative / Nitrite: Negative   Leuk Esterase: Negative / RBC: x / WBC 3-5 /HPF   Sq Epi: x / Non Sq Epi: x / Bacteria: x        PT/INR - ( 15 Apr 2018 19:11 )   PT: 12.5 sec;   INR: 1.15 ratio         PTT - ( 15 Apr 2018 19:11 )  PTT:30.9 sec    Lactate Trend        I personally interpreted this patient's labs. They were notable for no leukocytosis with WBC 7.9, hemoglobin 9.2 (at baseline), and platelets 132. Metabolic panel unremarkable with Cr 1.09. LFTs with , AST 83, ALT 74 with normal Tbili (and downtrending on repeat labs). Mag and lipase normal. VBG unremarkable with lactate 1.7. UA negative. Troponin negative.   I personally interpreted this patient's imaging. CTAP was notable for no SBO, but evidence of portal vein thrombosis and SMV clot  I personally interpreted this patient's ECG. It showed

## 2018-04-15 NOTE — H&P ADULT - HISTORY OF PRESENT ILLNESS
Angeline Boles is an 82 year old female with a history of hypertension, asthma, anal cancer s/p radiation and chemotherapy, recently diagnosed pancreatic cancer pending start of chemotherapy at [facility] next week presents for evaluation of persistent nausea and vomiting.    Patient reports    In the ED, T 98.2, HR 73, /72, O2 97% on RA. Labs showed no leukocytosis with WBC 7.9, hemoglobin 9.2 (at baseline), and platelets 132. Metabolic panel unremarkable with Cr 1.09. LFTs with , AST 83, ALT 74 with normal Tbili (and downtrending on repeat labs). Mag and lipase normal. VBG unremarkable with lactate 1.7. UA negative. Patient had a ED US that showed a gallbladder with sludge and cholelithiasis. CT abdomen/pelvis showed no evidence of SBO. Patient was given 3L NS, 1g Tylenol, and 4mg Zofran IV. Patient was admitted for inability to tolerate PO. Angeline Boles is an 82 year old female with a history of hypertension, asthma, anal cancer s/p radiation and chemotherapy, recently diagnosed pancreatic cancer presents for evaluation of persistent nausea and vomiting.    Patient reports that she was recently diagnosed with pancreatic cancer while in [place] when she had a CT scan for GI-related symptoms. She returned to [place] to have treatment was planning to continue her evaluation at [facility] this upcoming Wednesday. She reports that over the past three days, she has been having episodes of abdominal pain, and nausea/emesis with attempted PO intake. Reports that the pain is 9/10 and usually occurs at night when she is laying in bed. Unable to get comfortable. Pain is in the middle of her stomach and does not radiate, is not associated with any other symptoms. Additionally during this time, she has been unable to tolerate anything by mouth including medications. With water, food, or pills she immediately has emesis following ingestion. ROS otherwise positive for constipation, last bowel movement on Thursday. Denies any fevers, chills, headache, chest pain, shortness of breath, dysuria or increased frequency.     In the ED, T 98.2, HR 73, /72, O2 97% on RA. Labs showed no leukocytosis with WBC 7.9, hemoglobin 9.2 (at baseline), and platelets 132. Metabolic panel unremarkable with Cr 1.09. LFTs with , AST 83, ALT 74 with normal Tbili (and downtrending on repeat labs). Mag and lipase normal. VBG unremarkable with lactate 1.7. UA negative. Patient had a ED US that showed a gallbladder with sludge and cholelithiasis. CT abdomen/pelvis showed no evidence of SBO, but did show a portal vein thrombosis as well as a clot in the SMV. Patient was given 3L NS, 1g Tylenol, and 4mg Zofran IV. Started on a heparin gtt for new thrombosis after ED discussed with Dr. Augustin.

## 2018-04-15 NOTE — ED PROVIDER NOTE - PHYSICAL EXAMINATION
GEN: Well Appearing, Nontoxic, NAD  HEENT: Symm Facies, PERRL, EOMI, MMM, posterior pharynx clear  CV: No JVD/Bruits or stridor;  RRR w/o m/g/r  RESP: CTAB w/o w/r/r  ABD: Soft, mildly tender diffusely, nd, +bs, no guarding/rebound, no RUQ tender;  No CVAT  EXT: No lower extremity edema or calf tenderness. WWP, palpable pulses. FROMx4  SKIN: No erythema, lesions or rash  Neuro: Grossly intact, AOX3 with normal speech, CN II-XII intact; Sensation intact, motor 5/5 throughout; gait normal GEN: Well Appearing, Nontoxic, NAD  HEENT: Symm Facies, PERRL, EOMI, MMM, posterior pharynx clear  CV: No JVD/Bruits or stridor;  RRR w/o m/g/r  RESP: CTAB w/o w/r/r  ABD: Soft, mildly tender diffusely, nd, +bs, no guarding/rebound, no RUQ tender;  No CVAT  EXT: No lower extremity edema or calf tenderness. WWP, palpable pulses. FROMx4  SKIN: No erythema, lesions or rash  Neuro: Grossly intact, AOX3 with normal speech, CN II-XII intact; Sensation intact, motor 5/5 throughout; gait normal  Attending Lazaro: Gen: NAD, heent: atrauamtic, eomi, perrla, mmm, op pink, uvula midline, neck; nttp, no nuchal rigidity, chest: nttp, no crepitus, cv: rrr, no murmurs, lungs: ctab, abd: soft, ttp epigastric area, +BS, no guarding, ext: wwp, neg homans, skin: no rash, neuro: awake and alert, following commands, speech clear, sensation and strength intact, no focal deficits

## 2018-04-16 ENCOUNTER — TRANSCRIPTION ENCOUNTER (OUTPATIENT)
Age: 83
End: 2018-04-16

## 2018-04-16 DIAGNOSIS — I81 PORTAL VEIN THROMBOSIS: ICD-10-CM

## 2018-04-16 DIAGNOSIS — Z79.899 OTHER LONG TERM (CURRENT) DRUG THERAPY: ICD-10-CM

## 2018-04-16 LAB
ALBUMIN SERPL ELPH-MCNC: 3.3 G/DL — SIGNIFICANT CHANGE UP (ref 3.3–5)
ALP SERPL-CCNC: 509 U/L — HIGH (ref 40–120)
ALT FLD-CCNC: 64 U/L RC — HIGH (ref 10–45)
ANION GAP SERPL CALC-SCNC: 12 MMOL/L — SIGNIFICANT CHANGE UP (ref 5–17)
APTT BLD: 107 SEC — HIGH (ref 27.5–37.4)
APTT BLD: 38.6 SEC — HIGH (ref 27.5–37.4)
APTT BLD: > 200 SEC (ref 27.5–37.4)
AST SERPL-CCNC: 59 U/L — HIGH (ref 10–40)
BILIRUB SERPL-MCNC: 0.5 MG/DL — SIGNIFICANT CHANGE UP (ref 0.2–1.2)
BUN SERPL-MCNC: 8 MG/DL — SIGNIFICANT CHANGE UP (ref 7–23)
CALCIUM SERPL-MCNC: 8.5 MG/DL — SIGNIFICANT CHANGE UP (ref 8.4–10.5)
CHLORIDE SERPL-SCNC: 104 MMOL/L — SIGNIFICANT CHANGE UP (ref 96–108)
CO2 SERPL-SCNC: 22 MMOL/L — SIGNIFICANT CHANGE UP (ref 22–31)
CREAT SERPL-MCNC: 0.98 MG/DL — SIGNIFICANT CHANGE UP (ref 0.5–1.3)
GLUCOSE SERPL-MCNC: 147 MG/DL — HIGH (ref 70–99)
HCT VFR BLD CALC: 26.3 % — LOW (ref 34.5–45)
HGB BLD-MCNC: 8.8 G/DL — LOW (ref 11.5–15.5)
INR BLD: 1.26 RATIO — HIGH (ref 0.88–1.16)
MAGNESIUM SERPL-MCNC: 2 MG/DL — SIGNIFICANT CHANGE UP (ref 1.6–2.6)
MCHC RBC-ENTMCNC: 32.3 PG — SIGNIFICANT CHANGE UP (ref 27–34)
MCHC RBC-ENTMCNC: 33.5 GM/DL — SIGNIFICANT CHANGE UP (ref 32–36)
MCV RBC AUTO: 96.4 FL — SIGNIFICANT CHANGE UP (ref 80–100)
PLATELET # BLD AUTO: 107 K/UL — LOW (ref 150–400)
POTASSIUM SERPL-MCNC: 3.7 MMOL/L — SIGNIFICANT CHANGE UP (ref 3.5–5.3)
POTASSIUM SERPL-SCNC: 3.7 MMOL/L — SIGNIFICANT CHANGE UP (ref 3.5–5.3)
PROT SERPL-MCNC: 6 G/DL — SIGNIFICANT CHANGE UP (ref 6–8.3)
PROTHROM AB SERPL-ACNC: 13.7 SEC — HIGH (ref 9.8–12.7)
RBC # BLD: 2.73 M/UL — LOW (ref 3.8–5.2)
RBC # FLD: 14.1 % — SIGNIFICANT CHANGE UP (ref 10.3–14.5)
SODIUM SERPL-SCNC: 138 MMOL/L — SIGNIFICANT CHANGE UP (ref 135–145)
TROPONIN T SERPL-MCNC: <0.01 NG/ML — SIGNIFICANT CHANGE UP (ref 0–0.06)
WBC # BLD: 7.4 K/UL — SIGNIFICANT CHANGE UP (ref 3.8–10.5)
WBC # FLD AUTO: 7.4 K/UL — SIGNIFICANT CHANGE UP (ref 3.8–10.5)

## 2018-04-16 RX ORDER — MORPHINE SULFATE 50 MG/1
1 CAPSULE, EXTENDED RELEASE ORAL ONCE
Qty: 0 | Refills: 0 | Status: DISCONTINUED | OUTPATIENT
Start: 2018-04-16 | End: 2018-04-16

## 2018-04-16 RX ORDER — MIRTAZAPINE 45 MG/1
1 TABLET, ORALLY DISINTEGRATING ORAL
Qty: 0 | Refills: 0 | COMMUNITY

## 2018-04-16 RX ORDER — MIRTAZAPINE 45 MG/1
15 TABLET, ORALLY DISINTEGRATING ORAL AT BEDTIME
Qty: 0 | Refills: 0 | Status: DISCONTINUED | OUTPATIENT
Start: 2018-04-16 | End: 2018-04-27

## 2018-04-16 RX ORDER — PANTOPRAZOLE SODIUM 20 MG/1
40 TABLET, DELAYED RELEASE ORAL
Qty: 0 | Refills: 0 | Status: DISCONTINUED | OUTPATIENT
Start: 2018-04-16 | End: 2018-04-27

## 2018-04-16 RX ORDER — SODIUM CHLORIDE 9 MG/ML
1000 INJECTION, SOLUTION INTRAVENOUS
Qty: 0 | Refills: 0 | Status: DISCONTINUED | OUTPATIENT
Start: 2018-04-16 | End: 2018-04-27

## 2018-04-16 RX ORDER — ALBUTEROL 90 UG/1
2 AEROSOL, METERED ORAL EVERY 6 HOURS
Qty: 0 | Refills: 0 | Status: DISCONTINUED | OUTPATIENT
Start: 2018-04-16 | End: 2018-04-27

## 2018-04-16 RX ORDER — FAMOTIDINE 10 MG/ML
20 INJECTION INTRAVENOUS ONCE
Qty: 0 | Refills: 0 | Status: DISCONTINUED | OUTPATIENT
Start: 2018-04-16 | End: 2018-04-16

## 2018-04-16 RX ORDER — ONDANSETRON 8 MG/1
4 TABLET, FILM COATED ORAL EVERY 6 HOURS
Qty: 0 | Refills: 0 | Status: DISCONTINUED | OUTPATIENT
Start: 2018-04-16 | End: 2018-04-19

## 2018-04-16 RX ORDER — ONDANSETRON 8 MG/1
1 TABLET, FILM COATED ORAL
Qty: 0 | Refills: 0 | COMMUNITY

## 2018-04-16 RX ORDER — MORPHINE SULFATE 50 MG/1
1 CAPSULE, EXTENDED RELEASE ORAL EVERY 4 HOURS
Qty: 0 | Refills: 0 | Status: DISCONTINUED | OUTPATIENT
Start: 2018-04-16 | End: 2018-04-16

## 2018-04-16 RX ORDER — PANTOPRAZOLE SODIUM 20 MG/1
1 TABLET, DELAYED RELEASE ORAL
Qty: 0 | Refills: 0 | COMMUNITY

## 2018-04-16 RX ORDER — LIPASE/PROTEASE/AMYLASE 16-48-48K
3 CAPSULE,DELAYED RELEASE (ENTERIC COATED) ORAL
Qty: 0 | Refills: 0 | COMMUNITY

## 2018-04-16 RX ORDER — OXYCODONE HYDROCHLORIDE 5 MG/1
1 TABLET ORAL
Qty: 0 | Refills: 0 | COMMUNITY

## 2018-04-16 RX ORDER — FLUTICASONE FUROATE AND VILANTEROL TRIFENATATE 100; 25 UG/1; UG/1
1 POWDER RESPIRATORY (INHALATION)
Qty: 0 | Refills: 0 | COMMUNITY

## 2018-04-16 RX ORDER — ENOXAPARIN SODIUM 100 MG/ML
80 INJECTION SUBCUTANEOUS
Qty: 0 | Refills: 0 | Status: DISCONTINUED | OUTPATIENT
Start: 2018-04-16 | End: 2018-04-22

## 2018-04-16 RX ORDER — FAMOTIDINE 10 MG/ML
20 INJECTION INTRAVENOUS ONCE
Qty: 0 | Refills: 0 | Status: DISCONTINUED | OUTPATIENT
Start: 2018-04-16 | End: 2018-04-27

## 2018-04-16 RX ADMIN — MORPHINE SULFATE 1 MILLIGRAM(S): 50 CAPSULE, EXTENDED RELEASE ORAL at 06:32

## 2018-04-16 RX ADMIN — HEPARIN SODIUM 900 UNIT(S)/HR: 5000 INJECTION INTRAVENOUS; SUBCUTANEOUS at 16:10

## 2018-04-16 RX ADMIN — ONDANSETRON 4 MILLIGRAM(S): 8 TABLET, FILM COATED ORAL at 17:16

## 2018-04-16 RX ADMIN — MORPHINE SULFATE 1 MILLIGRAM(S): 50 CAPSULE, EXTENDED RELEASE ORAL at 20:57

## 2018-04-16 RX ADMIN — MORPHINE SULFATE 1 MILLIGRAM(S): 50 CAPSULE, EXTENDED RELEASE ORAL at 04:30

## 2018-04-16 RX ADMIN — MORPHINE SULFATE 1 MILLIGRAM(S): 50 CAPSULE, EXTENDED RELEASE ORAL at 17:40

## 2018-04-16 RX ADMIN — PANTOPRAZOLE SODIUM 40 MILLIGRAM(S): 20 TABLET, DELAYED RELEASE ORAL at 07:59

## 2018-04-16 RX ADMIN — HEPARIN SODIUM 1100 UNIT(S)/HR: 5000 INJECTION INTRAVENOUS; SUBCUTANEOUS at 09:10

## 2018-04-16 RX ADMIN — MORPHINE SULFATE 1 MILLIGRAM(S): 50 CAPSULE, EXTENDED RELEASE ORAL at 22:48

## 2018-04-16 RX ADMIN — MORPHINE SULFATE 1 MILLIGRAM(S): 50 CAPSULE, EXTENDED RELEASE ORAL at 10:12

## 2018-04-16 RX ADMIN — HEPARIN SODIUM 1400 UNIT(S)/HR: 5000 INJECTION INTRAVENOUS; SUBCUTANEOUS at 00:24

## 2018-04-16 RX ADMIN — HEPARIN SODIUM 0 UNIT(S)/HR: 5000 INJECTION INTRAVENOUS; SUBCUTANEOUS at 07:59

## 2018-04-16 RX ADMIN — MORPHINE SULFATE 1 MILLIGRAM(S): 50 CAPSULE, EXTENDED RELEASE ORAL at 17:25

## 2018-04-16 RX ADMIN — MIRTAZAPINE 15 MILLIGRAM(S): 45 TABLET, ORALLY DISINTEGRATING ORAL at 21:09

## 2018-04-16 RX ADMIN — ENOXAPARIN SODIUM 80 MILLIGRAM(S): 100 INJECTION SUBCUTANEOUS at 21:09

## 2018-04-16 RX ADMIN — SODIUM CHLORIDE 75 MILLILITER(S): 9 INJECTION, SOLUTION INTRAVENOUS at 02:21

## 2018-04-16 RX ADMIN — HEPARIN SODIUM 6500 UNIT(S): 5000 INJECTION INTRAVENOUS; SUBCUTANEOUS at 00:23

## 2018-04-16 RX ADMIN — MORPHINE SULFATE 1 MILLIGRAM(S): 50 CAPSULE, EXTENDED RELEASE ORAL at 10:45

## 2018-04-16 NOTE — DISCHARGE NOTE ADULT - HOME CARE AGENCY
[facility]  RN and PT for assessment and evaluation [facility]  RN and PT for assessment and evaluation

## 2018-04-16 NOTE — DISCHARGE NOTE ADULT - PLAN OF CARE
improved disease process follow up with Dr Salcido continue xarelto as prescribed . Take your "blood thinners" as prescribed.  Walking is encouraged, increase activity as tolerated.  If you develop new leg pain, swelling, and/or redness contact your healthcare provider.  If you develop new chest pain with difficulty breathing, a rapid heart rate and/or a feeling of passing out call emergency medical services 911. Valsarten was discontinued. Blood pressure was optimized. Follow up with your medical doctor to establish long term blood pressure treatment goals. continue alll inhaler you do not make enough thyroid hormone  signs & symptoms of low levels of thyroid hormone - tired, getting cold easily, coarse or thin hair, constipation, shortness of breath, swelling, irregular periods  your doctor will do thyroid hormone blood tests at least once a year to monitor if medication dose is adequate  take your thyroid medicine as directed by your doctor & on empty stomach

## 2018-04-16 NOTE — DISCHARGE NOTE ADULT - MEDICATION SUMMARY - MEDICATIONS TO TAKE
I will START or STAY ON the medications listed below when I get home from the hospital:    oxyCODONE 5 mg oral tablet  -- 1 tab(s) by mouth every 4 - 6 hours as needed for pain  -- Indication: For Moderate pain    acetaminophen 325 mg oral tablet  -- 2 tab(s) by mouth every 8 hours, As needed, Mild Pain (1 - 3)  -- Indication: For Mild pain    Xarelto 15 mg oral tablet  -- 1 tab(s) by mouth 2 times a day  for 21 days then 20 mgs daily.  -- Check with your doctor before becoming pregnant.  It is very important that you take or use this exactly as directed.  Do not skip doses or discontinue unless directed by your doctor.  Obtain medical advice before taking any non-prescription drugs as some may affect the action of this medication.  Take with food.    -- Indication: For thrombosis    Remeron 15 mg oral tablet  -- 1 tab(s) by mouth once a day (at bedtime)  -- Indication: For depression    Zofran 8 mg oral tablet  -- 1 tab(s) by mouth 3 times a day, As Needed  -- Indication: For Nausea and vomiting    dronabinol 2.5 mg oral capsule  -- 1 cap(s) by mouth 2 times a day MDD:2 capsule  -- Indication: For Appetite stimulant    Breo Ellipta 200 mcg-25 mcg/inh inhalation powder  -- 1 puff(s) inhaled once a day  -- Indication: For Asthma    albuterol 90 mcg/inh inhalation aerosol  -- 2 puff(s) inhaled every 6 hours, As needed, Shortness of Breath and/or Wheezing  -- Indication: For Asthma    Zenpep  -- 3 cap(s) by mouth 3 times a day and 1 capsule with a snack  -- Indication: For Pancreatic    docusate sodium 100 mg oral capsule  -- 1 cap(s) by mouth 3 times a day, As Needed  -- Indication: For cosntipation    polyethylene glycol 3350 oral powder for reconstitution  -- 17 gram(s) by mouth once a day  -- Indication: For constipation    senna oral tablet  -- 2 tab(s) by mouth once a day (at bedtime)  -- Indication: For constiaption    Protonix 40 mg oral delayed release tablet  -- 1 tab(s) by mouth 2 times a day  -- Indication: For gerd    levothyroxine 88 mcg (0.088 mg) oral tablet  -- 1 tab(s) by mouth once a day  -- Indication: For Hypothyroidism, unspecified type    calcium-vitamin D 500 mg-200 intl units oral tablet  -- 1 tab(s) by mouth 2 times a day  -- Indication: For supplemetnation    Multiple Vitamins oral tablet  -- 1 tab(s) by mouth once a day  -- Indication: For supplementation    biotin  -- 5000 milligram(s) by mouth once a day  -- Indication: For supplementation    Vitamin D3 1000 intl units oral capsule  -- 1 cap(s) by mouth once a day  -- Indication: For supplementation I will START or STAY ON the medications listed below when I get home from the hospital:    oxyCODONE 5 mg oral tablet  -- 1 tab(s) by mouth every 4 - 6 hours as needed for pain  -- Indication: For Moderate pain    acetaminophen 325 mg oral tablet  -- 2 tab(s) by mouth every 8 hours, As needed, Mild Pain (1 - 3)  -- Indication: For Mild pain    Xarelto 15 mg oral tablet  -- 1 tab(s) by mouth 2 times a day  for 21 days then 20 mgs daily.  -- Check with your doctor before becoming pregnant.  It is very important that you take or use this exactly as directed.  Do not skip doses or discontinue unless directed by your doctor.  Obtain medical advice before taking any non-prescription drugs as some may affect the action of this medication.  Take with food.    -- Indication: For thrombosis    Remeron 15 mg oral tablet  -- 1 tab(s) by mouth once a day (at bedtime)  -- Indication: For depression    Zofran 8 mg oral tablet  -- 1 tab(s) by mouth 3 times a day, As Needed  -- Indication: For Nausea and vomiting    dronabinol 2.5 mg oral capsule  -- 1 cap(s) by mouth 2 times a day MDD:2 capsule  -- Indication: For Appetite stimulant    Breo Ellipta 200 mcg-25 mcg/inh inhalation powder  -- 1 puff(s) inhaled once a day  -- Indication: For Asthma    albuterol 90 mcg/inh inhalation aerosol  -- 2 puff(s) inhaled every 6 hours, As needed, Shortness of Breath and/or Wheezing  -- Indication: For Asthma    Zenpep  -- 3 cap(s) by mouth 3 times a day and 1 capsule with a snack  -- Indication: For Pancreatic    docusate sodium 100 mg oral capsule  -- 1 cap(s) by mouth 3 times a day, As Needed  -- Indication: For cosntipation    senna oral tablet  -- 2 tab(s) by mouth once a day (at bedtime)  -- Indication: For constiaption    bisacodyl 5 mg oral delayed release tablet  -- 1 tab(s) by mouth once a day   -- Indication: For constipation    bisacodyl 10 mg rectal suppository  -- 1 suppository(ies) rectally prn if no BM in 3 days  -- For rectal use only.  Keep in refrigerator.  Do not freeze.    -- Indication: For constipation    Protonix 40 mg oral delayed release tablet  -- 1 tab(s) by mouth 2 times a day  -- Indication: For gerd    levothyroxine 88 mcg (0.088 mg) oral tablet  -- 1 tab(s) by mouth once a day  -- Indication: For Hypothyroidism, unspecified type    calcium-vitamin D 500 mg-200 intl units oral tablet  -- 1 tab(s) by mouth 2 times a day  -- Indication: For supplemetnation    Multiple Vitamins oral tablet  -- 1 tab(s) by mouth once a day  -- Indication: For supplementation    biotin  -- 5000 milligram(s) by mouth once a day  -- Indication: For supplementation    Vitamin D3 1000 intl units oral capsule  -- 1 cap(s) by mouth once a day  -- Indication: For supplementation

## 2018-04-16 NOTE — DISCHARGE NOTE ADULT - HOSPITAL COURSE
82 year old female with a history of hypertension, asthma, anal cancer s/p radiation and chemotherapy, recently diagnosed pancreatic cancer pending start of chemotherapy at [facility] next week presents for evaluation of persistent nausea and vomiting admitted for inability to tolerate PO. Patient admitted with Portal Vein Thrombosis and started on a Heparin drip and Intractable vomiting started on Zofran IV. The pt is an 82F with hx HTN, asthma, anal cancer s/p radiation/chemo with recent diagnosis of pancreatic cancer, was scheduled to start chemo at [facility] 4/18, now presents with nausea/vomiting/abdominal pain.    #Pancreatic cancer- recent diagnosis, was to start chemotherapy gemzar/abraxane at [facility] on 4/18; with increased abdominal pain/n/v  - CT with intrahepatic and extrahepatic biliary dilation  - on morphine IV or oral option with oxycodone PRN   - pathology obtained from previous biopsy, placed in chart; I discussed with previous Oncologist  - GI following- celiac plexus block not needed at this point - currently pain controlled with medication  - with biliary obstruction; s/p ERCP with malignant appearing stricture s/p sphincterotomy/stent 4/23- bilirubin improved  - had first dose of chemo yesterday 4/25 with single agent gemcitabine at 800 mg / m2 and will add abraxane next week. 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    #constipation  - likely opiate induced - cont aggressive bowel regimen  - nonobstructive bowel gas pattern on abd xray 4/25  - enema ordered today    # Epigastric pain -  - resolved, Lipase today normal - no evidence of post procedure pancreatitis

## 2018-04-16 NOTE — CONSULT NOTE ADULT - ASSESSMENT
82 year old female with history of recently diagnosed unresectable pancreatic cancer who presents with intractable abdominal pain and pO intolerance  -NPO, IVF, analgesics, anti-emetics  -Trend LFTs daily

## 2018-04-16 NOTE — CONSULT NOTE ADULT - SUBJECTIVE AND OBJECTIVE BOX
Patient is a 82y old  Female who presents with a chief complaint of nausea and vomiting, inability to tolerate PO (2018 14:16)    HPI:  Angeline Boles is an 82 year old female with a history of hypertension, asthma, anal cancer s/p radiation and chemotherapy, recently diagnosed unresectable pancreatic cancer (in [place] by EUS, FNA), who presents with PO intolerance and abdominal pain.   Found to have elevated LFTs with , AST 83, ALT 74 with normal Tbili (and downtrending on repeat labs). Ultrasound in the ED revealed gallbladder with sludge and cholelithiasis. CT abdomen/pelvis showed no evidence of SBO, but did demonstrate intra/extrahepatic biliary dilation, portal vein thrombosis as well as a clot in the SMV. Pt now on heparin gtt.     PAST MEDICAL & SURGICAL HISTORY:  Cataract  Complete rotator cuff tear or rupture of left shoulder, not specified as traumatic  Hyperlipemia  Asthma: no h/o intubation, last episode of hospitalization due to asthma 10 years ago . No h/o medications on a daily basis  Anal cancer: diagnosed in , had chemotherapy and radiation in .  Complete rotator cuff tear or rupture of right shoulder, not specified as traumatic  Hypothyroidism  Hypertension  H/O shoulder surgery: total replacement right shoulder -   H/O total hysterectomy: 30 years ago      Allergies  Levaquin (Other)    MEDICATIONS  (STANDING):  dextrose 5% + sodium chloride 0.45%. 1000 milliLiter(s) (75 mL/Hr) IV Continuous <Continuous>  famotidine  IVPB 20 milliGRAM(s) IV Intermittent once  heparin  Infusion.  Unit(s)/Hr (14 mL/Hr) IV Continuous <Continuous>  mirtazapine 15 milliGRAM(s) Oral at bedtime  pantoprazole   Suspension 40 milliGRAM(s) Oral before breakfast    MEDICATIONS  (PRN):  ALBUTerol    90 MICROgram(s) HFA Inhaler 2 Puff(s) Inhalation every 6 hours PRN Shortness of Breath and/or Wheezing  ondansetron Injectable 4 milliGRAM(s) IV Push every 6 hours PRN Nausea and/or Vomiting    Social History: no smoking/alochol    Family History: noncontribuotry    Review of Systems:  General:  see HPI   CV:  No pain, palpitatioins, hypo/hypertension  Resp:  No dyspnea, cough, tachypnea, wheezing  GI:  see HPI  :  No pain, bleeding, incontinence, nocturia  Muscle:  No pain, weakness  Neuro:  No weakness, tingling, memory problems  Psych:  No fatigue, insomnia, mood problems, depression  Endocrine:  No polyuria, polydypsia, cold/heat intolerance  Heme:  No petechiae, ecchymosis, easy bruisability  Skin:  No rash, tattoos, scars, edema    Vital Signs Last 24 Hrs  T(C): 36.7 (2018 14:56), Max: 36.8 (15 Apr 2018 16:33)  T(F): 98 (2018 14:56), Max: 98.2 (15 Apr 2018 16:33)  HR: 61 (2018 14:56) (55 - 89)  BP: 133/88 (2018 14:56) (116/67 - 137/82)  BP(mean): --  RR: 19 (2018 14:56) (17 - 19)  SpO2: 92% (2018 14:56) (92% - 98%)    PHYSICAL EXAM:  Constitutional: NAD, well-developed  Neck: No palpable masses or LAD, supple  Cardiovascular: RRR, S1 and S2,   Respiratory: CTAB   Gastrointestinal: soft, NTND, normactive bowel sounds, no palpable HSM   Extremities: No peripheral edema, neg clubing, cyanosis  Skin: No rashes, jaundice    LABS:                        8.8    7.4   )-----------( 107      ( 2018 06:23 )             26.3     04-16    138  |  104  |  8   ----------------------------<  147<H>  3.7   |  22  |  0.98    Ca    8.5      2018 06:23  Phos  2.5     04-15  Mg     2.0     -16    TPro  6.0  /  Alb  3.3  /  TBili  0.5  /  DBili  x   /  AST  59<H>  /  ALT  64<H>  /  AlkPhos  509<H>  04-16    PT/INR - ( 2018 06:23 )   PT: 13.7 sec;   INR: 1.26 ratio         PTT - ( 2018 06:23 )  PTT:> 200 sec  Urinalysis Basic - ( 15 Apr 2018 22:42 )    Color: Colorless / Appearance: Clear / S.018 / pH: x  Gluc: x / Ketone: Negative  / Bili: Negative / Urobili: Negative   Blood: x / Protein: Negative / Nitrite: Negative   Leuk Esterase: Negative / RBC: x / WBC 3-5 /HPF   Sq Epi: x / Non Sq Epi: x / Bacteria: x      LIVER FUNCTIONS - ( 2018 06:23 )  Alb: 3.3 g/dL / Pro: 6.0 g/dL / ALK PHOS: 509 U/L / ALT: 64 U/L RC / AST: 59 U/L / GGT: x             RADIOLOGY & ADDITIONAL TESTS:  < from: CT Abdomen and Pelvis w/ IV Cont (04.15.18 @ 21:49) >    LIVER: Within normal limits.  BILE DUCTS: Mild to moderate intrahepatic and extrahepatic biliary ductal   dilatation the common bile duct measuring up to 1.0 cm.  GALLBLADDER: Distended with mild wall thickening measuring up to 5 mm and   mild pericholecystic fat stranding.  SPLEEN: Within normal limits.  PANCREAS: Irregular 3.5 cm pancreatic head/neck mass with atrophy of the   distal pancreas.  ADRENALS: Within normal limits.  KIDNEYS/URETERS: Subcentimeter hypodense bilateral renal foci, too small   to characterize.    BLADDER: Within normal limits.  REPRODUCTIVE ORGANS: Status post hysterectomy. No adnexal masses.    BOWEL: Small hiatal hernia. No bowel obstruction. Colonic diverticulosis   without evidence of acute diverticulitis. Appendix is unremarkable aside   from a few appendicoliths. No periappendiceal fat stranding or   inflammatory changes.  PERITONEUM: Small ascites  VESSELS:  Atherosclerotic calcifications. No contrast opacifying the main   portal vein compatible with thrombosis. Eccentric thrombus in the distal   superior mesenteric vein.  RETROPERITONEUM: No lymphadenopathy.    ABDOMINAL WALL: Small fat-containing umbilical hernia.  BONES: Degenerative changes of the spine. Sclerotic lesion in the right   sacrum likely a bone island.    < end of copied text >

## 2018-04-16 NOTE — DISCHARGE NOTE ADULT - PATIENT PORTAL LINK FT
You can access the [facility] Patient Portal, offered by [facility], by registering with the following website: [email or web address]

## 2018-04-16 NOTE — DISCHARGE NOTE ADULT - SECONDARY DIAGNOSIS.
Malignant neoplasm of pancreas, unspecified location of malignancy Essential hypertension Portal vein thrombosis Asthma Hypothyroidism, unspecified type

## 2018-04-16 NOTE — DISCHARGE NOTE ADULT - CARE PROVIDER_API CALL
Pb Zurita), Gastroenterology; Internal Medicine  [address]  [place], [place] [id number]  Phone: [phone]  Fax: [phone]    Clay Salcido), [facility]  [address]  [place] [address]  Phone: [phone]  Fax: [phone]

## 2018-04-16 NOTE — DISCHARGE NOTE ADULT - MEDICATION SUMMARY - MEDICATIONS TO STOP TAKING
I will STOP taking the medications listed below when I get home from the hospital:    valsartan 160 mg oral tablet  -- 1 tab(s) by mouth once a day  -- HOLD SBP<100 HR<60    simvastatin 10 mg oral tablet  -- 1 tab(s) by mouth once a day (at bedtime)

## 2018-04-16 NOTE — PROGRESS NOTE ADULT - ASSESSMENT
83 y/o female w hx of anal cancer in remission s/p radiation and chemo , HTN,Asthma , recently evaluated for abd pain and found to have panreatic mass with CT done on 3/27 showing 3.4 cm pancreatic head mass with moderaltely distended bladder , no dilatation of bile duct ( at the time ), and ecasemenet of adjacement vasculature , underwent EUS with bx and path consistent with pancreatic Ca per family , shceduled to start chemo at [facility] this week however presenting now with abd pain, N/V with no fever .. CT shows dilatation of biliary duct but nl Bili.. no fever or chills..   1- Abd pain /N/v : sec to cancer .. pt now tolerating po .. cont pain meds and attempt to advance diet. cot zofran   2- pancreatic Ca : not likley to be a surgical candidate .. plan for chemo .. discussed with pt at bedside then later with pt and daughter at bedside. and daughter on phone.. not clear if family would like to cont.care at [facility] vs at [facility]  at this time and point bili within [facility] however pt will likely need a stent for sx relief if sx worsen/ rise in bili  called GI consult.. f/u recommendations  3- DVT of < from: CT Abdomen and Pelvis w/ IV Cont (04.15.18 @ 21:49) >  Main portal vein thrombus. Eccentric thrombus in the distal superior   mesenteric vein.  on heparin .. will change to lovenox if pt and family agreeable to injections

## 2018-04-16 NOTE — DISCHARGE NOTE ADULT - CARE PLAN
Principal Discharge DX:	Portal vein thrombosis  Secondary Diagnosis:	Malignant neoplasm of pancreas, unspecified location of malignancy  Secondary Diagnosis:	Essential hypertension Principal Discharge DX:	Malignant neoplasm of pancreas, unspecified location of malignancy  Secondary Diagnosis:	Portal vein thrombosis  Secondary Diagnosis:	Essential hypertension  Secondary Diagnosis:	Asthma  Secondary Diagnosis:	Hypothyroidism, unspecified type Principal Discharge DX:	Malignant neoplasm of pancreas, unspecified location of malignancy  Goal:	improved disease process  Assessment and plan of treatment:	follow up with Dr Salcido  Secondary Diagnosis:	Portal vein thrombosis  Assessment and plan of treatment:	continue xarelto as prescribed . Take your "blood thinners" as prescribed.  Walking is encouraged, increase activity as tolerated.  If you develop new leg pain, swelling, and/or redness contact your healthcare provider.  If you develop new chest pain with difficulty breathing, a rapid heart rate and/or a feeling of passing out call emergency medical services 911.  Secondary Diagnosis:	Essential hypertension  Assessment and plan of treatment:	Valsarten was discontinued. Blood pressure was optimized. Follow up with your medical doctor to establish long term blood pressure treatment goals.  Secondary Diagnosis:	Asthma  Assessment and plan of treatment:	continue alll inhaler  Secondary Diagnosis:	Hypothyroidism, unspecified type  Assessment and plan of treatment:	you do not make enough thyroid hormone  signs & symptoms of low levels of thyroid hormone - tired, getting cold easily, coarse or thin hair, constipation, shortness of breath, swelling, irregular periods  your doctor will do thyroid hormone blood tests at least once a year to monitor if medication dose is adequate  take your thyroid medicine as directed by your doctor & on empty stomach

## 2018-04-17 LAB
HCT VFR BLD CALC: 27 % — LOW (ref 34.5–45)
HGB BLD-MCNC: 8.6 G/DL — LOW (ref 11.5–15.5)
MCHC RBC-ENTMCNC: 30.5 PG — SIGNIFICANT CHANGE UP (ref 27–34)
MCHC RBC-ENTMCNC: 31.9 GM/DL — LOW (ref 32–36)
MCV RBC AUTO: 95.7 FL — SIGNIFICANT CHANGE UP (ref 80–100)
PLATELET # BLD AUTO: 127 K/UL — LOW (ref 150–400)
RBC # BLD: 2.82 M/UL — LOW (ref 3.8–5.2)
RBC # FLD: 15.6 % — HIGH (ref 10.3–14.5)
WBC # BLD: 5.91 K/UL — SIGNIFICANT CHANGE UP (ref 3.8–10.5)
WBC # FLD AUTO: 5.91 K/UL — SIGNIFICANT CHANGE UP (ref 3.8–10.5)

## 2018-04-17 PROCEDURE — 76700 US EXAM ABDOM COMPLETE: CPT | Mod: 26

## 2018-04-17 RX ORDER — MORPHINE SULFATE 50 MG/1
1 CAPSULE, EXTENDED RELEASE ORAL
Qty: 0 | Refills: 0 | Status: DISCONTINUED | OUTPATIENT
Start: 2018-04-17 | End: 2018-04-23

## 2018-04-17 RX ORDER — DOCUSATE SODIUM 100 MG
100 CAPSULE ORAL THREE TIMES A DAY
Qty: 0 | Refills: 0 | Status: DISCONTINUED | OUTPATIENT
Start: 2018-04-17 | End: 2018-04-27

## 2018-04-17 RX ORDER — DOCUSATE SODIUM 100 MG
100 CAPSULE ORAL
Qty: 0 | Refills: 0 | Status: DISCONTINUED | OUTPATIENT
Start: 2018-04-17 | End: 2018-04-17

## 2018-04-17 RX ORDER — MORPHINE SULFATE 50 MG/1
2 CAPSULE, EXTENDED RELEASE ORAL EVERY 6 HOURS
Qty: 0 | Refills: 0 | Status: DISCONTINUED | OUTPATIENT
Start: 2018-04-17 | End: 2018-04-23

## 2018-04-17 RX ORDER — POLYETHYLENE GLYCOL 3350 17 G/17G
17 POWDER, FOR SOLUTION ORAL DAILY
Qty: 0 | Refills: 0 | Status: DISCONTINUED | OUTPATIENT
Start: 2018-04-17 | End: 2018-04-25

## 2018-04-17 RX ORDER — MORPHINE SULFATE 50 MG/1
1 CAPSULE, EXTENDED RELEASE ORAL ONCE
Qty: 0 | Refills: 0 | Status: DISCONTINUED | OUTPATIENT
Start: 2018-04-17 | End: 2018-04-17

## 2018-04-17 RX ADMIN — ONDANSETRON 4 MILLIGRAM(S): 8 TABLET, FILM COATED ORAL at 13:52

## 2018-04-17 RX ADMIN — ENOXAPARIN SODIUM 80 MILLIGRAM(S): 100 INJECTION SUBCUTANEOUS at 21:25

## 2018-04-17 RX ADMIN — SODIUM CHLORIDE 75 MILLILITER(S): 9 INJECTION, SOLUTION INTRAVENOUS at 20:19

## 2018-04-17 RX ADMIN — MORPHINE SULFATE 1 MILLIGRAM(S): 50 CAPSULE, EXTENDED RELEASE ORAL at 14:30

## 2018-04-17 RX ADMIN — MORPHINE SULFATE 2 MILLIGRAM(S): 50 CAPSULE, EXTENDED RELEASE ORAL at 17:38

## 2018-04-17 RX ADMIN — MORPHINE SULFATE 1 MILLIGRAM(S): 50 CAPSULE, EXTENDED RELEASE ORAL at 23:12

## 2018-04-17 RX ADMIN — Medication 100 MILLIGRAM(S): at 17:18

## 2018-04-17 RX ADMIN — POLYETHYLENE GLYCOL 3350 17 GRAM(S): 17 POWDER, FOR SOLUTION ORAL at 13:51

## 2018-04-17 RX ADMIN — Medication 100 MILLIGRAM(S): at 21:25

## 2018-04-17 RX ADMIN — MORPHINE SULFATE 1 MILLIGRAM(S): 50 CAPSULE, EXTENDED RELEASE ORAL at 23:50

## 2018-04-17 RX ADMIN — MORPHINE SULFATE 1 MILLIGRAM(S): 50 CAPSULE, EXTENDED RELEASE ORAL at 14:46

## 2018-04-17 RX ADMIN — PANTOPRAZOLE SODIUM 40 MILLIGRAM(S): 20 TABLET, DELAYED RELEASE ORAL at 06:05

## 2018-04-17 RX ADMIN — MORPHINE SULFATE 2 MILLIGRAM(S): 50 CAPSULE, EXTENDED RELEASE ORAL at 18:08

## 2018-04-17 RX ADMIN — ENOXAPARIN SODIUM 80 MILLIGRAM(S): 100 INJECTION SUBCUTANEOUS at 09:49

## 2018-04-17 NOTE — PROGRESS NOTE ADULT - SUBJECTIVE AND OBJECTIVE BOX
Patient is a 82y old  Female who presents with a chief complaint of nausea and vomiting, inability to tolerate PO (16 Apr 2018 14:16)                                                               INTERVAL HPI/OVERNIGHT EVENTS:    REVIEW OF SYSTEMS:     CONSTITUTIONAL: No weakness, fevers or chills  RESPIRATORY: No cough, wheezing,  No shortness of breath  CARDIOVASCULAR: No chest pain or palpitations  GASTROINTESTINAL:  abdominal pain  .  nausea, vomiting, no BM   GENITOURINARY: No dysuria, frequency or hematuria  NEUROLOGICAL: No numbness or weakness                                                                                                                                                                                                                                                                                 Medications:  MEDICATIONS  (STANDING):  dextrose 5% + sodium chloride 0.45%. 1000 milliLiter(s) (75 mL/Hr) IV Continuous <Continuous>  docusate sodium 100 milliGRAM(s) Oral three times a day  enoxaparin Injectable 80 milliGRAM(s) SubCutaneous two times a day  famotidine  IVPB 20 milliGRAM(s) IV Intermittent once  mirtazapine 15 milliGRAM(s) Oral at bedtime  pantoprazole   Suspension 40 milliGRAM(s) Oral before breakfast  polyethylene glycol 3350 17 Gram(s) Oral daily    MEDICATIONS  (PRN):  ALBUTerol    90 MICROgram(s) HFA Inhaler 2 Puff(s) Inhalation every 6 hours PRN Shortness of Breath and/or Wheezing  morphine  - Injectable 2 milliGRAM(s) IV Push every 6 hours PRN Severe Pain (7 - 10)  morphine  - Injectable 1 milliGRAM(s) IV Push every 3 hours PRN Moderate Pain (4 - 6)  ondansetron Injectable 4 milliGRAM(s) IV Push every 6 hours PRN Nausea and/or Vomiting       Allergies    Levaquin (Other)    Intolerances      Vital Signs Last 24 Hrs  T(C): 37 (17 Apr 2018 21:14), Max: 37 (17 Apr 2018 21:14)  T(F): 98.6 (17 Apr 2018 21:14), Max: 98.6 (17 Apr 2018 21:14)  HR: 64 (17 Apr 2018 21:14) (55 - 64)  BP: 132/72 (17 Apr 2018 21:14) (118/57 - 132/72)  BP(mean): --  RR: 16 (17 Apr 2018 21:14) (16 - 18)  SpO2: 95% (17 Apr 2018 21:14) (95% - 96%)  CAPILLARY BLOOD GLUCOSE          04-16 @ 07:01  -  04-17 @ 07:00  --------------------------------------------------------  IN: 1180 mL / OUT: 0 mL / NET: 1180 mL    04-17 @ 07:01  -  04-17 @ 22:46  --------------------------------------------------------  IN: 680 mL / OUT: 0 mL / NET: 680 mL      Physical Exam:    General:  moderate pain   HEENT:  Nonicteric, PERRLA  CV:  RRR, S1S2   Lungs:  CTA B/L, no wheezes, rales, rhonchi  Abdomen:  Soft, non-tender, no distended, positive BS  Extremities:  2+ pulses, no c/c, no edema  Skin:  Warm and dry, no rashes  :  No metzger  Neuro:  AAOx3, non-focal, grossly intact                                                                                                                                                                                                                                                                                                LABS:                               8.6    5.91  )-----------( 127      ( 17 Apr 2018 07:41 )             27.0                      04-16    138  |  104  |  8   ----------------------------<  147<H>  3.7   |  22  |  0.98    Ca    8.5      16 Apr 2018 06:23  Mg     2.0     04-16    TPro  6.0  /  Alb  3.3  /  TBili  0.5  /  DBili  x   /  AST  59<H>  /  ALT  64<H>  /  AlkPhos  509<H>  04-16                       RADIOLOGY & ADDITIONAL TESTS         I personally reviewed: [  ]EKG   [  ]CXR    [  ] CT      A/P:         Discussed with :     Amanda consultants' Notes   Time spent : Patient is a 82y old  Female who presents with a chief complaint of nausea and vomiting, inability to tolerate PO (16 Apr 2018 14:16)                                                               INTERVAL HPI/OVERNIGHT EVENTS:    REVIEW OF SYSTEMS:     CONSTITUTIONAL: No weakness, fevers or chills  RESPIRATORY: No cough, wheezing,  No shortness of breath  CARDIOVASCULAR: No chest pain or palpitations  GASTROINTESTINAL:  abdominal pain  .  nausea, vomiting, no BM   GENITOURINARY: No dysuria, frequency or hematuria  NEUROLOGICAL: No numbness or weakness                                                                                                                                                                                                                                                                                 Medications:  MEDICATIONS  (STANDING):  dextrose 5% + sodium chloride 0.45%. 1000 milliLiter(s) (75 mL/Hr) IV Continuous <Continuous>  docusate sodium 100 milliGRAM(s) Oral three times a day  enoxaparin Injectable 80 milliGRAM(s) SubCutaneous two times a day  famotidine  IVPB 20 milliGRAM(s) IV Intermittent once  mirtazapine 15 milliGRAM(s) Oral at bedtime  pantoprazole   Suspension 40 milliGRAM(s) Oral before breakfast  polyethylene glycol 3350 17 Gram(s) Oral daily    MEDICATIONS  (PRN):  ALBUTerol    90 MICROgram(s) HFA Inhaler 2 Puff(s) Inhalation every 6 hours PRN Shortness of Breath and/or Wheezing  morphine  - Injectable 2 milliGRAM(s) IV Push every 6 hours PRN Severe Pain (7 - 10)  morphine  - Injectable 1 milliGRAM(s) IV Push every 3 hours PRN Moderate Pain (4 - 6)  ondansetron Injectable 4 milliGRAM(s) IV Push every 6 hours PRN Nausea and/or Vomiting       Allergies    Levaquin (Other)    Intolerances      Vital Signs Last 24 Hrs  T(C): 37 (17 Apr 2018 21:14), Max: 37 (17 Apr 2018 21:14)  T(F): 98.6 (17 Apr 2018 21:14), Max: 98.6 (17 Apr 2018 21:14)  HR: 64 (17 Apr 2018 21:14) (55 - 64)  BP: 132/72 (17 Apr 2018 21:14) (118/57 - 132/72)  BP(mean): --  RR: 16 (17 Apr 2018 21:14) (16 - 18)  SpO2: 95% (17 Apr 2018 21:14) (95% - 96%)  CAPILLARY BLOOD GLUCOSE          04-16 @ 07:01  -  04-17 @ 07:00  --------------------------------------------------------  IN: 1180 mL / OUT: 0 mL / NET: 1180 mL    04-17 @ 07:01  -  04-17 @ 22:46  --------------------------------------------------------  IN: 680 mL / OUT: 0 mL / NET: 680 mL      Physical Exam:    General:  moderate pain   HEENT:  Nonicteric, PERRLA  CV:  RRR, S1S2   Lungs:  CTA B/L, no wheezes, rales, rhonchi  Abdomen:  Soft, tender epig   Extremities:  2+ pulses, no c/c, no edema  Skin:  Warm and dry, no rashes  :  No metzger  Neuro:  AAOx3, non-focal, grossly intact                                                                                                                                                                                                                                                                                                LABS:                               8.6    5.91  )-----------( 127      ( 17 Apr 2018 07:41 )             27.0                      04-16    138  |  104  |  8   ----------------------------<  147<H>  3.7   |  22  |  0.98    Ca    8.5      16 Apr 2018 06:23  Mg     2.0     04-16    TPro  6.0  /  Alb  3.3  /  TBili  0.5  /  DBili  x   /  AST  59<H>  /  ALT  64<H>  /  AlkPhos  509<H>  04-16

## 2018-04-17 NOTE — CONSULT NOTE ADULT - SUBJECTIVE AND OBJECTIVE BOX
Patient is a 82y old  Female who presented with a chief complaint of nausea and vomiting, inability to tolerate PO (2018 14:16)      HPI:  Angeline Boles is an 82 year old female with a history of hypertension, asthma, anal cancer s/p radiation and chemotherapy,unresectable pancreatic cancer (in [place] by EUS, FNA), who presents with PO intolerance and abdominal pain.   Found to have elevated LFTs with , AST 83, ALT 74 with normal Tbili (and downtrending on repeat labs). Ultrasound in the ED revealed gallbladder with sludge and cholelithiasis. CT abdomen/pelvis showed no evidence of SBO, but did demonstrate intra/extrahepatic biliary dilation, portal vein thrombosis as well as a clot in the SMV. Pt now on heparin gtt.       She reports that over the past three days, she has been having episodes of abdominal pain, and nausea/emesis with attempted PO intake. Reports that the pain is 9/10 and usually occurs at night when she is laying in bed. Unable to get comfortable. Pain is in the middle of her stomach and does not radiate, is not associated with any other symptoms. Additionally during this time, she has been unable to tolerate anything by mouth including medications. With water, food, or pills she immediately has emesis following ingestion. ROS otherwise positive for constipation, last bowel movement on Thursday. Denies any fevers, chills, headache, chest pain, shortness of breath, dysuria or increased frequency.       Currently with improved nausea, tolerating some PO. Appetite fair    PAST MEDICAL & SURGICAL HISTORY:  Cataract  Complete rotator cuff tear or rupture of left shoulder, not specified as traumatic  Hyperlipemia  Asthma: no h/o intubation, last episode of hospitalization due to asthma 10 years ago . No h/o medications on a daily basis  Anal cancer: diagnosed in , had chemotherapy and radiation in .  Complete rotator cuff tear or rupture of right shoulder, not specified as traumatic  Hypothyroidism  Hypertension  H/O shoulder surgery: total replacement right shoulder - 2016  H/O total hysterectomy: 30 years ago      Allergies  Levaquin (Other)      MEDICATIONS  (STANDING):  dextrose 5% + sodium chloride 0.45%. 1000 milliLiter(s) (75 mL/Hr) IV Continuous <Continuous>  enoxaparin Injectable 80 milliGRAM(s) SubCutaneous two times a day  famotidine  IVPB 20 milliGRAM(s) IV Intermittent once  mirtazapine 15 milliGRAM(s) Oral at bedtime  pantoprazole   Suspension 40 milliGRAM(s) Oral before breakfast    MEDICATIONS  (PRN):  ALBUTerol    90 MICROgram(s) HFA Inhaler 2 Puff(s) Inhalation every 6 hours PRN Shortness of Breath and/or Wheezing  ondansetron Injectable 4 milliGRAM(s) IV Push every 6 hours PRN Nausea and/or Vomiting      Social History:    no tobacco, ETOH    Advanced Directives: (X    ) None    (      ) DNR    (     ) DNI    (     ) Health Care Proxy:     Review of Systems:    General:  see HPI   CV:  No pain, palpitatioins, hypo/hypertension  Resp:  No dyspnea, cough, tachypnea, wheezing  GI:  see HPI  :  No pain, bleeding, incontinence, nocturia  Muscle:  No pain, weakness  Neuro:  No weakness, tingling, memory problems  Psych:  No fatigue, insomnia, mood problems, depression  Endocrine:  No polyuria, polydypsia, cold/heat intolerance  Heme:  No petechiae, ecchymosis, easy bruisability  Skin:  No rash, tattoos, scars, edema      Vital Signs Last 24 Hrs  T(C): 36.8 (2018 07:32), Max: 36.9 (2018 21:25)  T(F): 98.3 (2018 07:32), Max: 98.4 (2018 21:25)  HR: 55 (2018 07:32) (55 - 61)  BP: 118/57 (2018 07:32) (107/69 - 133/88)  BP(mean): --  RR: 18 (2018 07:32) (18 - 19)  SpO2: 96% (2018 07:32) (92% - 97%)    PHYSICAL EXAM:    Constitutional: NAD, well-developed, appears comfortable  Neck: supple  Respiratory: grossly clear  Cardiovascular: S1 and S2, RRR  Gastrointestinal: BS+, soft obese, neg HSM, no palpable mass, no rebound, guarding or rigidity  Extremities: No peripheral edema, neg clubing, cyanosis  Vascular: 2+ peripheral pulses  Neurological: A/O x 3, no focal deficits  Psychiatric: Normal mood, normal affect  Skin: No rashes, anicteric        LABS:                        8.6    5.91  )-----------( 127      ( 2018 07:41 )             27.0     04-16    138  |  104  |  8   ----------------------------<  147<H>  3.7   |  22  |  0.98    Ca    8.5      2018 06:23  Phos  2.5     04-15  Mg     2.0     -16    TPro  6.0  /  Alb  3.3  /  TBili  0.5  /  DBili  x   /  AST  59<H>  /  ALT  64<H>  /  AlkPhos  509<H>  -    PT/INR - ( 2018 06:23 )   PT: 13.7 sec;   INR: 1.26 ratio    PTT - ( 2018 22:57 )  PTT:38.6 sec    Urinalysis Basic - ( 15 Apr 2018 22:42 )    Color: Colorless / Appearance: Clear / S.018 / pH: x  Gluc: x / Ketone: Negative  / Bili: Negative / Urobili: Negative   Blood: x / Protein: Negative / Nitrite: Negative   Leuk Esterase: Negative / RBC: x / WBC 3-5 /HPF   Sq Epi: x / Non Sq Epi: x / Bacteria: x        RADIOLOGY & ADDITIONAL TESTS:  < from: CT Abdomen and Pelvis w/ IV Cont (04.15.18 @ 21:49) >  INTERPRETATION:  CLINICAL INFORMATION: Abdominal pain, nausea, inability   to tolerate oral intake, recent diagnosis of pancreatic CA 3 weeks prior   at an outside institution.    COMPARISON: None.    PROCEDURE:   CT of the Abdomen and Pelvis was performed with intravenous contrast.   Intravenous contrast: 90 ml Omnipaque 350. 10 ml discarded.  Oral contrast: None.  Sagittal and coronal reformats were performed.    FINDINGS:    LOWER CHEST: 3 mm left lower lobe pulmonary nodule (series 3, image 13).   Bibasilar subsegmental atelectasis. Heavy mitral valve annulus   calcifications.    LIVER: Within normal limits.  BILE DUCTS: Mild to moderate intrahepatic and extrahepatic biliary ductal   dilatation the common bile duct measuring up to 1.0 cm.  GALLBLADDER: Distended with mild wall thickening measuring up to 5 mm and   mild pericholecystic fat stranding.  SPLEEN: Within normal limits.  PANCREAS: Irregular 3.5 cm pancreatic head/neck mass with atrophy of the   distal pancreas.  ADRENALS: Within normal limits.  KIDNEYS/URETERS: Subcentimeter hypodense bilateral renal foci, too small   to characterize.    BLADDER: Within normal limits.  REPRODUCTIVE ORGANS: Status post hysterectomy. No adnexal masses.    BOWEL: Small hiatal hernia. No bowel obstruction. Colonic diverticulosis   without evidence of acute diverticulitis. Appendix is unremarkable aside   from a few appendicoliths. No periappendiceal fat stranding or   inflammatory changes.  PERITONEUM: Small ascites  VESSELS:  Atherosclerotic calcifications. No contrast opacifying the main   portal vein compatible with thrombosis. Eccentric thrombus in the distal   superior mesenteric vein.  RETROPERITONEUM: No lymphadenopathy.    ABDOMINAL WALL: Small fat-containing umbilical hernia.  BONES: Degenerative changes of the spine. Sclerotic lesion in the right   sacrum likely a bone island.    IMPRESSION:     1. Irregular pancreatic head neck mass resulting in intrahepatic and   extrahepatic biliary ductal dilatation and gallbladder obstruction.  2. Main portal vein thrombus. Eccentric thrombus in the distal superior   mesenteric vein. Patient is a 82y old  Female who presented with a chief complaint of nausea and vomiting, inability to tolerate PO (2018 14:16)    HPI:  Angeline Boles is an 82 year old female with a history of hypertension, asthma, anal cancer s/p radiation and chemotherapy, unresectable pancreatic cancer (in [place] by EUS, FNA), who presented with PO intolerance and abdominal pain.  Found to have elevated LFTs with , AST 83, ALT 74 with normal Tbili (and downtrending on repeat labs).  Ultrasound in the ED revealed gallbladder with sludge and cholelithiasis.  CT abdomen/pelvis showed no evidence of SBO, but did demonstrate intra/extrahepatic biliary dilation, portal vein thrombosis as well as a clot in the SMV. Pt now on heparin gtt.     She reports that over the past three days, she has been having episodes of abdominal pain, and nausea/emesis with attempted PO intake. Reports that the pain is 9/10 and usually occurs at night when she is laying in bed. Unable to get comfortable. Pain is in the middle of her stomach and does not radiate, is not associated with any other symptoms. Additionally during this time, she has been unable to tolerate anything by mouth including medications. With water, food, or pills she immediately has emesis following ingestion. ROS otherwise positive for constipation, last bowel movement on Thursday. Denies any fevers, chills, headache, chest pain, shortness of breath, dysuria or increased frequency.     Currently with improved nausea, tolerating some PO. Appetite fair    PAST MEDICAL & SURGICAL HISTORY:  Cataract  Complete rotator cuff tear or rupture of left shoulder, not specified as traumatic  Hyperlipemia  Asthma: no h/o intubation, last episode of hospitalization due to asthma 10 years ago . No h/o medications on a daily basis  Anal cancer: diagnosed in , had chemotherapy and radiation in .  Complete rotator cuff tear or rupture of right shoulder, not specified as traumatic  Hypothyroidism  Hypertension  H/O shoulder surgery: total replacement right shoulder - 2016  H/O total hysterectomy: 30 years ago    Allergies  Levaquin (Other)    MEDICATIONS  (STANDING):  dextrose 5% + sodium chloride 0.45%. 1000 milliLiter(s) (75 mL/Hr) IV Continuous <Continuous>  enoxaparin Injectable 80 milliGRAM(s) SubCutaneous two times a day  famotidine  IVPB 20 milliGRAM(s) IV Intermittent once  mirtazapine 15 milliGRAM(s) Oral at bedtime  pantoprazole   Suspension 40 milliGRAM(s) Oral before breakfast    MEDICATIONS  (PRN):  ALBUTerol    90 MICROgram(s) HFA Inhaler 2 Puff(s) Inhalation every 6 hours PRN Shortness of Breath and/or Wheezing  ondansetron Injectable 4 milliGRAM(s) IV Push every 6 hours PRN Nausea and/or Vomiting    Social History:  no tobacco, ETOH  Advanced Directives: (X    ) None    (      ) DNR    (     ) DNI    (     ) Health Care Proxy:     Review of Systems:  General:  see HPI   CV:  No pain, palpitatioins, hypo/hypertension  Resp:  No dyspnea, cough, tachypnea, wheezing  GI:  see HPI  :  No pain, bleeding, incontinence, nocturia  Muscle:  No pain, weakness  Neuro:  No weakness, tingling, memory problems  Psych:  No fatigue, insomnia, mood problems, depression  Endocrine:  No polyuria, polydypsia, cold/heat intolerance  Heme:  No petechiae, ecchymosis, easy bruisability  Skin:  No rash, tattoos, scars, edema    Vital Signs Last 24 Hrs  T(C): 36.8 (2018 07:32), Max: 36.9 (2018 21:25)  T(F): 98.3 (2018 07:32), Max: 98.4 (2018 21:25)  HR: 55 (2018 07:32) (55 - 61)  BP: 118/57 (2018 07:32) (107/69 - 133/88)  BP(mean): --  RR: 18 (2018 07:32) (18 - 19)  SpO2: 96% (2018 07:32) (92% - 97%)    PHYSICAL EXAM:  Constitutional: NAD, well-developed, appears comfortable  Neck: supple  Respiratory: grossly clear  Cardiovascular: S1 and S2, RRR  Gastrointestinal: BS+, soft obese, neg HSM, no palpable mass, no rebound, guarding or rigidity  Extremities: No peripheral edema, neg clubing, cyanosis  Vascular: 2+ peripheral pulses  Neurological: A/O x 3, no focal deficits  Psychiatric: Normal mood, normal affect  Skin: No rashes, anicteric    LABS:                      8.6    5.91  )-----------( 127      ( 2018 07:41 )             27.0     04-16    138  |  104  |  8   ----------------------------<  147<H>  3.7   |  22  |  0.98    Ca    8.5      2018 06:23  Phos  2.5     04-15  Mg     2.0     -16    TPro  6.0  /  Alb  3.3  /  TBili  0.5  /  DBili  x   /  AST  59<H>  /  ALT  64<H>  /  AlkPhos  509<H>  -    PT/INR - ( 2018 06:23 )   PT: 13.7 sec;   INR: 1.26 ratio    PTT - ( 2018 22:57 )  PTT:38.6 sec    Urinalysis Basic - ( 15 Apr 2018 22:42 )  Color: Colorless / Appearance: Clear / S.018 / pH: x  Gluc: x / Ketone: Negative  / Bili: Negative / Urobili: Negative   Blood: x / Protein: Negative / Nitrite: Negative   Leuk Esterase: Negative / RBC: x / WBC 3-5 /HPF   Sq Epi: x / Non Sq Epi: x / Bacteria: x    RADIOLOGY & ADDITIONAL TESTS:  < from: CT Abdomen and Pelvis w/ IV Cont (04.15.18 @ 21:49) >  INTERPRETATION:  CLINICAL INFORMATION: Abdominal pain, nausea, inability   to tolerate oral intake, recent diagnosis of pancreatic CA 3 weeks prior   at an outside institution.    COMPARISON: None.    PROCEDURE:   CT of the Abdomen and Pelvis was performed with intravenous contrast.   Intravenous contrast: 90 ml Omnipaque 350. 10 ml discarded.  Oral contrast: None.  Sagittal and coronal reformats were performed.    FINDINGS:    LOWER CHEST: 3 mm left lower lobe pulmonary nodule (series 3, image 13).   Bibasilar subsegmental atelectasis. Heavy mitral valve annulus   calcifications.    LIVER: Within normal limits.  BILE DUCTS: Mild to moderate intrahepatic and extrahepatic biliary ductal   dilatation the common bile duct measuring up to 1.0 cm.  GALLBLADDER: Distended with mild wall thickening measuring up to 5 mm and   mild pericholecystic fat stranding.  SPLEEN: Within normal limits.  PANCREAS: Irregular 3.5 cm pancreatic head/neck mass with atrophy of the   distal pancreas.  ADRENALS: Within normal limits.  KIDNEYS/URETERS: Subcentimeter hypodense bilateral renal foci, too small   to characterize.    BLADDER: Within normal limits.  REPRODUCTIVE ORGANS: Status post hysterectomy. No adnexal masses.    BOWEL: Small hiatal hernia. No bowel obstruction. Colonic diverticulosis   without evidence of acute diverticulitis. Appendix is unremarkable aside   from a few appendicoliths. No periappendiceal fat stranding or   inflammatory changes.  PERITONEUM: Small ascites  VESSELS:  Atherosclerotic calcifications. No contrast opacifying the main   portal vein compatible with thrombosis. Eccentric thrombus in the distal   superior mesenteric vein.  RETROPERITONEUM: No lymphadenopathy.    ABDOMINAL WALL: Small fat-containing umbilical hernia.  BONES: Degenerative changes of the spine. Sclerotic lesion in the right   sacrum likely a bone island.    IMPRESSION:     1. Irregular pancreatic head neck mass resulting in intrahepatic and   extrahepatic biliary ductal dilatation and gallbladder obstruction.  2. Main portal vein thrombus. Eccentric thrombus in the distal superior   mesenteric vein.

## 2018-04-17 NOTE — CONSULT NOTE ADULT - ASSESSMENT
The pt is an 82F with hx HTN, asthma, anal cancer s/p radiation/chemo with recent diagnosis of pancreatic cancer, was scheduled to start chemo at [facility] 4/18, now presents with nausea/vomiting/abdominal pain.    #Pancreatic cancer- recent diagnosis, was to start chemotherapy gemzar/abraxane at [facility] on 4/18; with increased abdominal pain/n/v  - CT with intrahepatic and extrahepatic biliary dilation- transaminases sl high, bilirubin normal- monitor LFTs  - on Lovenox BID for portal vein thrombosis  - on morphine prn for pain- discussed starting long acting pain medication- pt/daughter would like to hold off for now  - requested daughter to send records including pathology to office for review  - Gi following- celiac plexus block a possibility depending on course- currently refusing longer acting pain medication  - further plan including chemo pending review of outside records    #Anemia, normocytic- likely AOCD; no evidence of bleeding; was on oral iron previously  - will check iron studies/ b12/folate  - monitor Hg    d/w daughter bedside

## 2018-04-17 NOTE — CONSULT NOTE ADULT - ASSESSMENT
82 year old female with history of recently diagnosed unresectable pancreatic cancer who presents with intractable abdominal pain (improving) and poor PO intolerance    -Pain management, can consider Celiac Plexus block pending clinical course  -PO diet as tolerated  -Bowel regimen, likely narcotic associated constipation  -Trend LFTs daily    As per pt, family is deciding where to pursue further treatment    Thank you for the courtesy of this consult.  Joseph Burgos PA-C    [facility]  [phone] 82 year old female with history of recently diagnosed unresectable pancreatic cancer who presents with intractable abdominal pain (improving) and poor PO intolerance.    Rec-  -Pain management, can consider Celiac Plexus block pending clinical course  -PO diet as tolerated  -Bowel regimen, likely narcotic associated constipation  -Trend LFTs daily    As per pt, family is deciding where to pursue further treatment    Thank you for the courtesy of this consult.  Joseph Burgos PA-C    [facility]  [phone]

## 2018-04-17 NOTE — PROGRESS NOTE ADULT - ASSESSMENT
83 y/o female w hx of anal cancer in remission s/p radiation and chemo , HTN,Asthma , recently evaluated for abd pain and found to have panreatic mass with CT done on 3/27 showing 3.4 cm pancreatic head mass with moderaltely distended bladder , no dilatation of bile duct ( at the time ), and ecasemenet of adjacement vasculature , underwent EUS with bx and path consistent with pancreatic Ca per family , shceduled to start chemo at [facility] this week however presenting now with abd pain, N/V with no fever .. CT shows dilatation of biliary duct but nl Bili.. no fever or chills..   1- Abd pain /N/v : sec to cancer .. pt now tolerating po .. cont pain meds and attempt to advance diet. cot zofran   2- pancreatic Ca : not likley to be a surgical candidate .. plan for chemo .. discussed with pt and daughter at bedside. and daughter on phone.. not clear if family would like to cont.care at [facility] vs at [facility]  at this time and point bili within NL however pt will likely need a stent for sx relief if sx worsen/ rise in bili  called GI consult.. f/u recommendations  3- DVT of < from: CT Abdomen and Pelvis w/ IV Cont (04.15.18 @ 21:49) >  Main portal vein thrombus. Eccentric thrombus in the distal superior   mesenteric vein.  cont lovenox 81 y/o female w hx of anal cancer in remission s/p radiation and chemo , HTN,Asthma , recently evaluated for abd pain and found to have panreatic mass with CT done on 3/27 showing 3.4 cm pancreatic head mass with moderaltely distended bladder , no dilatation of bile duct ( at the time ), and ecasemenet of adjacement vasculature , underwent EUS with bx and path consistent with pancreatic Ca per family , shceduled to start chemo at [facility] this week however presenting now with abd pain, N/V with no fever .. CT shows dilatation of biliary duct but nl Bili.. no fever or chills..   1- Abd pain /N/v : sec to cancer .. pt now tolerating po .. cont pain meds and attempt to advance diet. cot zofran . d/w GI consider celiac block   2- pancreatic Ca : not likley to be a surgical candidate .. plan for chemo.. pt and daughter agreeable to see onc at [facility] and [facility].care here as opposed to [facility] .. called Onco consult   at this time and point bili within NL however pt will likely need a stent for sx relief if sx worsen/ rise in bili    3- DVT of < from: CT Abdomen and Pelvis w/ IV Cont (04.15.18 @ 21:49) >  Main portal vein thrombus. Eccentric thrombus in the distal superior   mesenteric vein.  cont lovenox     4- anemia : monitor H/H

## 2018-04-17 NOTE — CONSULT NOTE ADULT - SUBJECTIVE AND OBJECTIVE BOX
Patient is a 82y old  Female who presents with a chief complaint of nausea and vomiting, inability to tolerate PO (16 Apr 2018 14:16)      HPI:  The pt is an 82F with hx HTN, asthma, anal cancer s/p radiation/chemo with recent diagnosis of pancreatic cancer, was scheduled to start chemo at [facility] 4/18, now presents with nausea/vomiting/abdominal pain. Pt states she was diagnosed in [place], returned to [place] to undergo treatment. was doing overall ok until 4 days prior. had some abd pain but was controlled with tylenol prior. developed worsening n/v and inability to eat. Pain is also increased from prior. Now pain in back also, partly positional in bed.  + decreased oral intake    ROS; no f/c, no dyspnea, no cough, no chest pain, +n/v/abd pain per hpi; +constipation- no BM for few days; no melena/brbpr, BM dark with iron, no hematuria, no leg pain no leg swelling        PAST MEDICAL & SURGICAL HISTORY:  Cataract  Complete rotator cuff tear or rupture of left shoulder, not specified as traumatic  Hyperlipemia  Asthma: no h/o intubation, last episode of hospitalization due to asthma 10 years ago . No h/o medications on a daily basis  Anal cancer: diagnosed in 2010, had chemotherapy and radiation in 2010.  Complete rotator cuff tear or rupture of right shoulder, not specified as traumatic  Hypothyroidism  Hypertension  H/O shoulder surgery: total replacement right shoulder - 2016  H/O total hysterectomy: 30 years ago      SOCIAL HISTORY:  Smoking - Non smoker   Alcohol - Social  Drugs - No drug use  lives alone; , has 4 children    FAMILY HISTORY:  No pertinent family history in first degree relatives      MEDICATIONS  (STANDING):  dextrose 5% + sodium chloride 0.45%. 1000 milliLiter(s) (75 mL/Hr) IV Continuous <Continuous>  docusate sodium 100 milliGRAM(s) Oral three times a day  enoxaparin Injectable 80 milliGRAM(s) SubCutaneous two times a day  famotidine  IVPB 20 milliGRAM(s) IV Intermittent once  mirtazapine 15 milliGRAM(s) Oral at bedtime  pantoprazole   Suspension 40 milliGRAM(s) Oral before breakfast  polyethylene glycol 3350 17 Gram(s) Oral daily    MEDICATIONS  (PRN):  ALBUTerol    90 MICROgram(s) HFA Inhaler 2 Puff(s) Inhalation every 6 hours PRN Shortness of Breath and/or Wheezing  morphine  - Injectable 2 milliGRAM(s) IV Push every 6 hours PRN Severe Pain (7 - 10)  morphine  - Injectable 1 milliGRAM(s) IV Push every 3 hours PRN Moderate Pain (4 - 6)  ondansetron Injectable 4 milliGRAM(s) IV Push every 6 hours PRN Nausea and/or Vomiting      Allergies    Levaquin (Other)    Intolerances        Vital Signs Last 24 Hrs  T(C): 36.8 (17 Apr 2018 07:32), Max: 36.9 (16 Apr 2018 21:25)  T(F): 98.3 (17 Apr 2018 07:32), Max: 98.4 (16 Apr 2018 21:25)  HR: 55 (17 Apr 2018 07:32) (55 - 61)  BP: 118/57 (17 Apr 2018 07:32) (107/69 - 133/88)  BP(mean): --  RR: 18 (17 Apr 2018 07:32) (18 - 19)  SpO2: 96% (17 Apr 2018 07:32) (92% - 97%)    PHYSICAL EXAM  General: adult in NAD  HEENT: clear oropharynx, anicteric sclera, pink conjunctiva  Neck: supple  CV: normal S1/S2 with no murmur rubs or gallops  Lungs: clear to auscultation, no wheezes, no rales  Abdomen: soft, slight epigastric tenderness, non-distended, positive bowel sounds  Ext: no clubbing cyanosis or edema  Skin: no rashes and no petechiae  Lymph Nodes: No LAD in axillae, groin, neck  Neuro: alert and oriented X 3, no focal deficits    LABS:                          8.6    5.91  )-----------( 127      ( 17 Apr 2018 07:41 )             27.0         Mean Cell Volume : 95.7 fl  Mean Cell Hemoglobin : 30.5 pg  Mean Cell Hemoglobin Concentration : 31.9 gm/dL  Auto Neutrophil # : x  Auto Lymphocyte # : x  Auto Monocyte # : x  Auto Eosinophil # : x  Auto Basophil # : x  Auto Neutrophil % : x  Auto Lymphocyte % : x  Auto Monocyte % : x  Auto Eosinophil % : x  Auto Basophil % : x      Serial CBC's  04-17 @ 07:41  Hct-27.0 / Hgb-8.6 / Plat-127 / RBC-2.82 / WBC-5.91  Serial CBC's  04-16 @ 06:23  Hct-26.3 / Hgb-8.8 / Plat-107 / RBC-2.73 / WBC-7.4  Serial CBC's  04-15 @ 19:11  Hct-27.8 / Hgb-9.2 / Plat-132 / RBC-2.86 / WBC-7.9      04-16    138  |  104  |  8   ----------------------------<  147<H>  3.7   |  22  |  0.98    Ca    8.5      16 Apr 2018 06:23  Phos  2.5     04-15  Mg     2.0     04-16    TPro  6.0  /  Alb  3.3  /  TBili  0.5  /  DBili  x   /  AST  59<H>  /  ALT  64<H>  /  AlkPhos  509<H>  04-16      PT/INR - ( 16 Apr 2018 06:23 )   PT: 13.7 sec;   INR: 1.26 ratio         PTT - ( 16 Apr 2018 22:57 )  PTT:38.6 sec        Radiology:    < from: US Abdomen Complete (04.17.18 @ 10:39) >  IMPRESSION:     Redemonstration of an ill-defined pancreatic head mass. There is   resulting pancreatic and biliary ductal dilatation.    Distended gallbladder with intraluminal sludge, wall thickening,   pericholecystic fluid.     Small volume intraperitoneal ascites and small right pleural effusion.    < from: CT Abdomen and Pelvis w/ IV Cont (04.15.18 @ 21:49) >  IMPRESSION:     1. Irregular pancreatic head neck mass resulting in intrahepatic and   extrahepatic biliary ductal dilatation and gallbladder obstruction.  2. Main portal vein thrombus. Eccentric thrombus in the distal superior   mesenteric vein.

## 2018-04-18 LAB
ALBUMIN SERPL ELPH-MCNC: 3.2 G/DL — LOW (ref 3.3–5)
ALP SERPL-CCNC: 539 U/L — HIGH (ref 40–120)
ALT FLD-CCNC: 100 U/L — HIGH (ref 10–45)
ANION GAP SERPL CALC-SCNC: 12 MMOL/L — SIGNIFICANT CHANGE UP (ref 5–17)
AST SERPL-CCNC: 131 U/L — HIGH (ref 10–40)
BILIRUB SERPL-MCNC: 1.8 MG/DL — HIGH (ref 0.2–1.2)
BUN SERPL-MCNC: 4 MG/DL — LOW (ref 7–23)
CALCIUM SERPL-MCNC: 9.1 MG/DL — SIGNIFICANT CHANGE UP (ref 8.4–10.5)
CHLORIDE SERPL-SCNC: 101 MMOL/L — SIGNIFICANT CHANGE UP (ref 96–108)
CO2 SERPL-SCNC: 24 MMOL/L — SIGNIFICANT CHANGE UP (ref 22–31)
CREAT SERPL-MCNC: 0.94 MG/DL — SIGNIFICANT CHANGE UP (ref 0.5–1.3)
FERRITIN SERPL-MCNC: 163 NG/ML — HIGH (ref 15–150)
FOLATE SERPL-MCNC: >20 NG/ML — SIGNIFICANT CHANGE UP
GLUCOSE SERPL-MCNC: 130 MG/DL — HIGH (ref 70–99)
HCT VFR BLD CALC: 27.8 % — LOW (ref 34.5–45)
HGB BLD-MCNC: 9.2 G/DL — LOW (ref 11.5–15.5)
IRON SATN MFR SERPL: 18 % — SIGNIFICANT CHANGE UP (ref 14–50)
IRON SATN MFR SERPL: 45 UG/DL — SIGNIFICANT CHANGE UP (ref 30–160)
MCHC RBC-ENTMCNC: 31 PG — SIGNIFICANT CHANGE UP (ref 27–34)
MCHC RBC-ENTMCNC: 33.1 GM/DL — SIGNIFICANT CHANGE UP (ref 32–36)
MCV RBC AUTO: 93.6 FL — SIGNIFICANT CHANGE UP (ref 80–100)
PLATELET # BLD AUTO: 126 K/UL — LOW (ref 150–400)
POTASSIUM SERPL-MCNC: 3.7 MMOL/L — SIGNIFICANT CHANGE UP (ref 3.5–5.3)
POTASSIUM SERPL-SCNC: 3.7 MMOL/L — SIGNIFICANT CHANGE UP (ref 3.5–5.3)
PROT SERPL-MCNC: 6.2 G/DL — SIGNIFICANT CHANGE UP (ref 6–8.3)
RBC # BLD: 2.97 M/UL — LOW (ref 3.8–5.2)
RBC # FLD: 16.1 % — HIGH (ref 10.3–14.5)
SODIUM SERPL-SCNC: 137 MMOL/L — SIGNIFICANT CHANGE UP (ref 135–145)
TIBC SERPL-MCNC: 244 UG/DL — SIGNIFICANT CHANGE UP (ref 220–430)
UIBC SERPL-MCNC: 199 UG/DL — SIGNIFICANT CHANGE UP (ref 110–370)
WBC # BLD: 5.04 K/UL — SIGNIFICANT CHANGE UP (ref 3.8–10.5)
WBC # FLD AUTO: 5.04 K/UL — SIGNIFICANT CHANGE UP (ref 3.8–10.5)

## 2018-04-18 RX ORDER — ACETAMINOPHEN 500 MG
650 TABLET ORAL EVERY 8 HOURS
Qty: 0 | Refills: 0 | Status: DISCONTINUED | OUTPATIENT
Start: 2018-04-18 | End: 2018-04-18

## 2018-04-18 RX ORDER — ACETAMINOPHEN 500 MG
650 TABLET ORAL EVERY 8 HOURS
Qty: 0 | Refills: 0 | Status: DISCONTINUED | OUTPATIENT
Start: 2018-04-18 | End: 2018-04-27

## 2018-04-18 RX ORDER — ONDANSETRON 8 MG/1
4 TABLET, FILM COATED ORAL ONCE
Qty: 0 | Refills: 0 | Status: COMPLETED | OUTPATIENT
Start: 2018-04-18 | End: 2018-04-18

## 2018-04-18 RX ORDER — OXYCODONE HYDROCHLORIDE 5 MG/1
5 TABLET ORAL EVERY 4 HOURS
Qty: 0 | Refills: 0 | Status: DISCONTINUED | OUTPATIENT
Start: 2018-04-18 | End: 2018-04-24

## 2018-04-18 RX ADMIN — ONDANSETRON 4 MILLIGRAM(S): 8 TABLET, FILM COATED ORAL at 16:22

## 2018-04-18 RX ADMIN — ONDANSETRON 4 MILLIGRAM(S): 8 TABLET, FILM COATED ORAL at 18:28

## 2018-04-18 RX ADMIN — MORPHINE SULFATE 2 MILLIGRAM(S): 50 CAPSULE, EXTENDED RELEASE ORAL at 07:32

## 2018-04-18 RX ADMIN — POLYETHYLENE GLYCOL 3350 17 GRAM(S): 17 POWDER, FOR SOLUTION ORAL at 11:17

## 2018-04-18 RX ADMIN — MORPHINE SULFATE 1 MILLIGRAM(S): 50 CAPSULE, EXTENDED RELEASE ORAL at 18:28

## 2018-04-18 RX ADMIN — ALBUTEROL 2 PUFF(S): 90 AEROSOL, METERED ORAL at 05:15

## 2018-04-18 RX ADMIN — MORPHINE SULFATE 2 MILLIGRAM(S): 50 CAPSULE, EXTENDED RELEASE ORAL at 06:11

## 2018-04-18 RX ADMIN — ENOXAPARIN SODIUM 80 MILLIGRAM(S): 100 INJECTION SUBCUTANEOUS at 21:42

## 2018-04-18 RX ADMIN — PANTOPRAZOLE SODIUM 40 MILLIGRAM(S): 20 TABLET, DELAYED RELEASE ORAL at 05:14

## 2018-04-18 RX ADMIN — ENOXAPARIN SODIUM 80 MILLIGRAM(S): 100 INJECTION SUBCUTANEOUS at 09:22

## 2018-04-18 RX ADMIN — Medication 100 MILLIGRAM(S): at 05:14

## 2018-04-18 RX ADMIN — Medication 100 MILLIGRAM(S): at 17:38

## 2018-04-18 RX ADMIN — MORPHINE SULFATE 1 MILLIGRAM(S): 50 CAPSULE, EXTENDED RELEASE ORAL at 19:00

## 2018-04-18 NOTE — PROGRESS NOTE ADULT - ASSESSMENT
82 year old female with history of recently diagnosed unresectable pancreatic cancer who presents with intractable abdominal pain (improving) and poor PO intolerance.    Rec-  -Pain management, can consider Celiac Plexus block pending clinical course  -PO diet as tolerated  -Bowel regimen as ordered, likely narcotic associated constipation  -Trend LFTs daily  -Slight increase noted in TB, currently not requiring ERCP with stent, but may need in the future. Will re-address if TB continues to rise  -Heme/Onc input noted      Joseph Burgos PA-C    [facility]  [phone]

## 2018-04-18 NOTE — PROGRESS NOTE ADULT - SUBJECTIVE AND OBJECTIVE BOX
Patient is a 82y old  Female who presented with a chief complaint of nausea and vomiting, inability to tolerate PO (16 Apr 2018 14:16)      INTERVAL HPI/OVERNIGHT EVENTS:  still with abdominal pain, improved with analgesics  no BM  +flatus  no nausea or vomiting  appetite poor to fair as per pt    MEDICATIONS  (STANDING):  dextrose 5% + sodium chloride 0.45%. 1000 milliLiter(s) (75 mL/Hr) IV Continuous <Continuous>  docusate sodium 100 milliGRAM(s) Oral three times a day  enoxaparin Injectable 80 milliGRAM(s) SubCutaneous two times a day  famotidine  IVPB 20 milliGRAM(s) IV Intermittent once  mirtazapine 15 milliGRAM(s) Oral at bedtime  pantoprazole   Suspension 40 milliGRAM(s) Oral before breakfast  polyethylene glycol 3350 17 Gram(s) Oral daily    MEDICATIONS  (PRN):  acetaminophen   Tablet. 650 milliGRAM(s) Oral every 8 hours PRN Moderate Pain (4 - 6)  ALBUTerol    90 MICROgram(s) HFA Inhaler 2 Puff(s) Inhalation every 6 hours PRN Shortness of Breath and/or Wheezing  morphine  - Injectable 2 milliGRAM(s) IV Push every 6 hours PRN Severe Pain (7 - 10)  morphine  - Injectable 1 milliGRAM(s) IV Push every 3 hours PRN Moderate Pain (4 - 6)  ondansetron Injectable 4 milliGRAM(s) IV Push every 6 hours PRN Nausea and/or Vomiting      Allergies  Levaquin (Other)      Review of Systems:  General:  no fever or chills  CV:  No pain, palpitatioins, hypo/hypertension  Resp:  No dyspnea, cough, tachypnea, wheezing  : no pain, incontinence, nocturia  Muscle:  No pain, weakness  Neuro:  No weakness, tingling, memory problems  Psych:  No fatigue, insomnia, mood problems, depression  Endocrine:  No polyuria, polydypsia, cold/heat intolerance  Heme:  No petechiae, ecchymosis, easy bruisability  Skin:  No rash, tattoos, scars, edema    Vital Signs Last 24 Hrs  T(C): 36.4 (18 Apr 2018 07:14), Max: 37 (17 Apr 2018 21:14)  T(F): 97.5 (18 Apr 2018 07:14), Max: 98.6 (17 Apr 2018 21:14)  HR: 67 (18 Apr 2018 07:14) (64 - 67)  BP: 114/59 (18 Apr 2018 07:14) (114/59 - 132/72)  BP(mean): --  RR: 16 (18 Apr 2018 07:14) (16 - 16)  SpO2: 95% (18 Apr 2018 07:14) (95% - 95%)    PHYSICAL EXAM:  Constitutional: NAD, well-developed, appears comfortable  Neck: supple  Respiratory: grossly clear  Cardiovascular: S1 and S2, RRR  Gastrointestinal: BS+, soft obese, neg HSM, no palpable mass, no rebound, guarding or rigidity  Extremities: No peripheral edema, neg clubbing, cyanosis  Vascular: 2+ peripheral pulses  Neurological: A/O x 3, no focal deficits  Psychiatric: Normal mood, normal affect  Skin: No rashes, anicteric    LABS:                        9.2    5.04  )-----------( 126      ( 18 Apr 2018 09:16 )             27.8     04-18    137  |  101  |  4<L>  ----------------------------<  130<H>  3.7   |  24  |  0.94    Ca    9.1      18 Apr 2018 07:48    TPro  6.2  /  Alb  3.2<L>  /  TBili  1.8<H>  /  DBili  x   /  AST  131<H>  /  ALT  100<H>  /  AlkPhos  539<H>  04-18    PTT - ( 16 Apr 2018 22:57 )  PTT:38.6 sec      RADIOLOGY & ADDITIONAL TESTS:  < from: US Abdomen Complete (04.17.18 @ 10:39) >    INTERPRETATION:  CLINICAL INFORMATION: Diffuse abdominal pain.    COMPARISON: CT abdomen/pelvis 4/15/2018.    TECHNIQUE: Sonography of the abdomen.     FINDINGS:    Liver: Mild to moderate intrahepatic biliary ductal dilatation. No focal   liver mass seen.    Bile ducts: Mild to moderate intrahepatic and extrahepatic biliary ductal   dilatation. Common bile duct measures up to 13 mm.     Gallbladder: Distended with intraluminal sludge. Mild wall thickening,   measuring 4 mm. Small volume pericholecystic fluid.     Pancreas: Redemonstration of an ill-defined pancreatic head mass,   measuring approximately 2.0 x 3.3 x 3.5. Atrophic pancreatic body and  tail. The pancreatic duct is dilated to 6 mm.    Spleen: 11.6 cm. Within normal limits.    Right kidney: 8.9 cm. No hydronephrosis.        Left kidney: 9.8 cm.  No hydronephrosis.        Ascites: Small volume perihepatic and perisplenic ascites.    Aorta and IVC: Visualized portions are within normal limits.    Miscellaneous: Small right pleural effusion.    IMPRESSION:     Redemonstration of an ill-defined pancreatic head mass. There is   resulting pancreatic and biliary ductal dilatation.    Distended gallbladder with intraluminal sludge, wall thickening,   pericholecystic fluid.     Small volume intraperitoneal ascites and small right pleural effusion.

## 2018-04-18 NOTE — PROGRESS NOTE ADULT - ASSESSMENT
81 y/o female w hx of anal cancer in remission s/p radiation and chemo , HTN,Asthma , recently evaluated for abd pain and found to have panreatic mass with CT done on 3/27 showing 3.4 cm pancreatic head mass with moderaltely distended bladder , no dilatation of bile duct ( at the time ), and ecasemenet of adjacement vasculature , underwent EUS with bx and path consistent with pancreatic Ca per family , shceduled to start chemo at [facility] this week however presenting now with abd pain, N/V with no fever .. CT shows dilatation of biliary duct but nl Bili.. no fever or chills..   1- Abd pain /N/v : sec to cancer .. pt now tolerating po .. cont pain meds . cot zofran . d/w GI consider celiac block   2- pancreatic Ca : not likley to be a surgical candidate .. plan for chemo.. pt and daughter agreeable to see onc at [facility] and cont.care here as opposed to [facility] .. mild increase in TB .. cont to monitor ..d/w onco ( op onc and our onco )   can consider dcing with plans for o/p chemo however will cont to monitor TB at this time : may need Stent placement   3- DVT of < from: CT Abdomen and Pelvis w/ IV Cont (04.15.18 @ 21:49) >  Main portal vein thrombus. Eccentric thrombus in the distal superior   mesenteric vein.  cont lovenox : consider changing to eliquis upon dc since pt and family not interested in lovenox     4- anemia : monitor H/H

## 2018-04-18 NOTE — PROGRESS NOTE ADULT - SUBJECTIVE AND OBJECTIVE BOX
Chief Complaint:  fu  History of Present Illness:  feeling a little better, pain more controlled but still present and nausea better. She has not had a BM in a number of days. She denies CP, SOB, cough, F/C    MEDICATIONS  (STANDING):  dextrose 5% + sodium chloride 0.45%. 1000 milliLiter(s) (75 mL/Hr) IV Continuous <Continuous>  docusate sodium 100 milliGRAM(s) Oral three times a day  enoxaparin Injectable 80 milliGRAM(s) SubCutaneous two times a day  famotidine  IVPB 20 milliGRAM(s) IV Intermittent once  mirtazapine 15 milliGRAM(s) Oral at bedtime  pantoprazole   Suspension 40 milliGRAM(s) Oral before breakfast  polyethylene glycol 3350 17 Gram(s) Oral daily    MEDICATIONS  (PRN):  acetaminophen   Tablet. 650 milliGRAM(s) Oral every 8 hours PRN Moderate Pain (4 - 6)  ALBUTerol    90 MICROgram(s) HFA Inhaler 2 Puff(s) Inhalation every 6 hours PRN Shortness of Breath and/or Wheezing  morphine  - Injectable 2 milliGRAM(s) IV Push every 6 hours PRN Severe Pain (7 - 10)  morphine  - Injectable 1 milliGRAM(s) IV Push every 3 hours PRN Moderate Pain (4 - 6)  ondansetron Injectable 4 milliGRAM(s) IV Push every 6 hours PRN Nausea and/or Vomiting      Allergies    Levaquin (Other)    Intolerances        Vital Signs Last 24 Hrs  T(C): 36.8 (18 Apr 2018 11:43), Max: 37 (17 Apr 2018 21:14)  T(F): 98.3 (18 Apr 2018 11:43), Max: 98.6 (17 Apr 2018 21:14)  HR: 67 (18 Apr 2018 11:43) (64 - 67)  BP: 138/79 (18 Apr 2018 11:43) (114/59 - 138/79)  BP(mean): --  RR: 18 (18 Apr 2018 11:43) (16 - 18)  SpO2: 96% (18 Apr 2018 11:43) (95% - 96%)    PHYSICAL EXAM  General: adult in NAD  HEENT: clear oropharynx, anicteric sclera, pink conjunctiva  CV: normal S1/S2 with no murmur rubs or gallops  Lungs: clear to auscultation, no wheezes, no rales  Abdomen: soft, slight epigastric tenderness, non-distended, positive bowel sounds  Ext: no clubbing cyanosis or edema  Skin: no rashes and no petechiae    LABS:                          9.2    5.04  )-----------( 126      ( 18 Apr 2018 09:16 )             27.8         Mean Cell Volume : 93.6 fl  Mean Cell Hemoglobin : 31.0 pg  Mean Cell Hemoglobin Concentration : 33.1 gm/dL  Auto Neutrophil # : x  Auto Lymphocyte # : x  Auto Monocyte # : x  Auto Eosinophil # : x  Auto Basophil # : x  Auto Neutrophil % : x  Auto Lymphocyte % : x  Auto Monocyte % : x  Auto Eosinophil % : x  Auto Basophil % : x      Serial CBC's  04-18 @ 09:16  Hct-27.8 / Hgb-9.2 / Plat-126 / RBC-2.97 / WBC-5.04  Serial CBC's  04-17 @ 07:41  Hct-27.0 / Hgb-8.6 / Plat-127 / RBC-2.82 / WBC-5.91  Serial CBC's  04-16 @ 06:23  Hct-26.3 / Hgb-8.8 / Plat-107 / RBC-2.73 / WBC-7.4  Serial CBC's  04-15 @ 19:11  Hct-27.8 / Hgb-9.2 / Plat-132 / RBC-2.86 / WBC-7.9      04-18    137  |  101  |  4<L>  ----------------------------<  130<H>  3.7   |  24  |  0.94    Ca    9.1      18 Apr 2018 07:48    TPro  6.2  /  Alb  3.2<L>  /  TBili  1.8<H>  /  DBili  x   /  AST  131<H>  /  ALT  100<H>  /  AlkPhos  539<H>  04-18      PTT - ( 16 Apr 2018 22:57 )  PTT:38.6 sec    Iron - Total Binding Capacity.: 244 ug/dL (04-18 @ 10:59)

## 2018-04-18 NOTE — PROGRESS NOTE ADULT - ASSESSMENT
The pt is an 82F with hx HTN, asthma, anal cancer s/p radiation/chemo with recent diagnosis of pancreatic cancer, was scheduled to start chemo at [facility] 4/18, now presents with nausea/vomiting/abdominal pain.    #Pancreatic cancer- recent diagnosis, was to start chemotherapy gemzar/abraxane at [facility] on 4/18; with increased abdominal pain/n/v  - CT with intrahepatic and extrahepatic biliary dilation- transaminases sl high, bilirubin increased today - monitor LFTs  - on Lovenox BID for portal vein thrombosis  - on morphine prn for pain- would oral option with oxycodone for DC planning  - will review records obtained from [facility] d/w oncologist at [facility] that evaluated patient  - GI following- celiac plexus block a possibility depending on course- currently probably not needed, does not need stent at this point but bili increasing  - would like to start chemo as outpt next week, however if bilirubin rises that would be contraindication. Will reevaluate in AM after labs drawn, if increasing slowly will consider first cycle in the hospital.    #Anemia, normocytic- likely AOCD; no evidence of bleeding; was on oral iron previously  - iron studies/ b12/folate normal  - monitor Hg    d/w daughter and grandson at bedside  D/W Dr. Augustin

## 2018-04-19 LAB
ALBUMIN SERPL ELPH-MCNC: 3.2 G/DL — LOW (ref 3.3–5)
ALP SERPL-CCNC: 512 U/L — HIGH (ref 40–120)
ALT FLD-CCNC: 97 U/L — HIGH (ref 10–45)
ANION GAP SERPL CALC-SCNC: 13 MMOL/L — SIGNIFICANT CHANGE UP (ref 5–17)
AST SERPL-CCNC: 110 U/L — HIGH (ref 10–40)
BILIRUB SERPL-MCNC: 1.7 MG/DL — HIGH (ref 0.2–1.2)
BUN SERPL-MCNC: <4 MG/DL — LOW (ref 7–23)
CALCIUM SERPL-MCNC: 8.9 MG/DL — SIGNIFICANT CHANGE UP (ref 8.4–10.5)
CHLORIDE SERPL-SCNC: 98 MMOL/L — SIGNIFICANT CHANGE UP (ref 96–108)
CO2 SERPL-SCNC: 24 MMOL/L — SIGNIFICANT CHANGE UP (ref 22–31)
CREAT SERPL-MCNC: 0.84 MG/DL — SIGNIFICANT CHANGE UP (ref 0.5–1.3)
GLUCOSE SERPL-MCNC: 134 MG/DL — HIGH (ref 70–99)
HCT VFR BLD CALC: 27.2 % — LOW (ref 34.5–45)
HGB BLD-MCNC: 9.2 G/DL — LOW (ref 11.5–15.5)
MCHC RBC-ENTMCNC: 31.2 PG — SIGNIFICANT CHANGE UP (ref 27–34)
MCHC RBC-ENTMCNC: 33.8 GM/DL — SIGNIFICANT CHANGE UP (ref 32–36)
MCV RBC AUTO: 92.2 FL — SIGNIFICANT CHANGE UP (ref 80–100)
PLATELET # BLD AUTO: 120 K/UL — LOW (ref 150–400)
POTASSIUM SERPL-MCNC: 3.3 MMOL/L — LOW (ref 3.5–5.3)
POTASSIUM SERPL-SCNC: 3.3 MMOL/L — LOW (ref 3.5–5.3)
PROT SERPL-MCNC: 6 G/DL — SIGNIFICANT CHANGE UP (ref 6–8.3)
RBC # BLD: 2.95 M/UL — LOW (ref 3.8–5.2)
RBC # FLD: 15.7 % — HIGH (ref 10.3–14.5)
SODIUM SERPL-SCNC: 135 MMOL/L — SIGNIFICANT CHANGE UP (ref 135–145)
WBC # BLD: 5.34 K/UL — SIGNIFICANT CHANGE UP (ref 3.8–10.5)
WBC # FLD AUTO: 5.34 K/UL — SIGNIFICANT CHANGE UP (ref 3.8–10.5)

## 2018-04-19 RX ORDER — ONDANSETRON 8 MG/1
4 TABLET, FILM COATED ORAL THREE TIMES A DAY
Qty: 0 | Refills: 0 | Status: DISCONTINUED | OUTPATIENT
Start: 2018-04-19 | End: 2018-04-24

## 2018-04-19 RX ORDER — ONDANSETRON 8 MG/1
4 TABLET, FILM COATED ORAL DAILY
Qty: 0 | Refills: 0 | Status: DISCONTINUED | OUTPATIENT
Start: 2018-04-19 | End: 2018-04-21

## 2018-04-19 RX ORDER — DRONABINOL 2.5 MG
2.5 CAPSULE ORAL
Qty: 0 | Refills: 0 | Status: DISCONTINUED | OUTPATIENT
Start: 2018-04-19 | End: 2018-04-25

## 2018-04-19 RX ORDER — POTASSIUM CHLORIDE 20 MEQ
10 PACKET (EA) ORAL EVERY 4 HOURS
Qty: 0 | Refills: 0 | Status: COMPLETED | OUTPATIENT
Start: 2018-04-19 | End: 2018-04-19

## 2018-04-19 RX ORDER — POTASSIUM CHLORIDE 20 MEQ
20 PACKET (EA) ORAL ONCE
Qty: 0 | Refills: 0 | Status: COMPLETED | OUTPATIENT
Start: 2018-04-19 | End: 2018-04-19

## 2018-04-19 RX ADMIN — ENOXAPARIN SODIUM 80 MILLIGRAM(S): 100 INJECTION SUBCUTANEOUS at 09:54

## 2018-04-19 RX ADMIN — MORPHINE SULFATE 2 MILLIGRAM(S): 50 CAPSULE, EXTENDED RELEASE ORAL at 23:05

## 2018-04-19 RX ADMIN — Medication 20 MILLIEQUIVALENT(S): at 12:19

## 2018-04-19 RX ADMIN — Medication 2.5 MILLIGRAM(S): at 10:00

## 2018-04-19 RX ADMIN — MORPHINE SULFATE 2 MILLIGRAM(S): 50 CAPSULE, EXTENDED RELEASE ORAL at 22:06

## 2018-04-19 RX ADMIN — Medication 2.5 MILLIGRAM(S): at 17:12

## 2018-04-19 RX ADMIN — SODIUM CHLORIDE 75 MILLILITER(S): 9 INJECTION, SOLUTION INTRAVENOUS at 23:43

## 2018-04-19 RX ADMIN — ONDANSETRON 4 MILLIGRAM(S): 8 TABLET, FILM COATED ORAL at 09:54

## 2018-04-19 RX ADMIN — ONDANSETRON 4 MILLIGRAM(S): 8 TABLET, FILM COATED ORAL at 17:12

## 2018-04-19 RX ADMIN — Medication 25 MILLIEQUIVALENT(S): at 23:22

## 2018-04-19 RX ADMIN — MORPHINE SULFATE 1 MILLIGRAM(S): 50 CAPSULE, EXTENDED RELEASE ORAL at 01:00

## 2018-04-19 RX ADMIN — Medication 25 MILLIEQUIVALENT(S): at 20:32

## 2018-04-19 RX ADMIN — ONDANSETRON 4 MILLIGRAM(S): 8 TABLET, FILM COATED ORAL at 12:19

## 2018-04-19 RX ADMIN — ENOXAPARIN SODIUM 80 MILLIGRAM(S): 100 INJECTION SUBCUTANEOUS at 22:11

## 2018-04-19 RX ADMIN — MORPHINE SULFATE 1 MILLIGRAM(S): 50 CAPSULE, EXTENDED RELEASE ORAL at 01:15

## 2018-04-19 RX ADMIN — SODIUM CHLORIDE 75 MILLILITER(S): 9 INJECTION, SOLUTION INTRAVENOUS at 20:01

## 2018-04-19 NOTE — PROGRESS NOTE ADULT - ASSESSMENT
82 year old female with history of recently diagnosed unresectable pancreatic cancer who presents with intractable abdominal pain (improving) and poor PO intolerance.    Rec-  -Pain management, can consider Celiac Plexus block pending clinical course  -PO diet as tolerated  -Will add Marinol for appetite  -Bowel regimen as ordered, likely narcotic associated constipation  -Trend LFTs daily  -Slight increase noted in TB, currently not requiring ERCP with stent, but may need in the future. Will re-address if TB continues to rise  -Heme/Onc input noted

## 2018-04-19 NOTE — DIETITIAN INITIAL EVALUATION ADULT. - OTHER INFO
Pt seen for consult for "Assessment, decreased PO intake, food choice recommendation/education." Pt reports some wt loss PTA as stated above, noted admit wt of 167 pounds and most recent wt of about 170 pounds- increase in house may be related to fluid shifts. Pt reports NKFA. States she was taking multivitamin, calcium c vitamin D, biotin and vitamin D3 at home. Pt reports she has not tried Ensure Enlive which she has at bedside or Ensure Clear which she received this morning. Pt disinterested in discussing food choice or diet at this time given nausea.

## 2018-04-19 NOTE — DIETITIAN INITIAL EVALUATION ADULT. - PHYSICAL APPEARANCE
Pt agreeable to nutrition focused physical exam, Pt overall c good muscle and fat stores, some decrease in muscle around clavicles noted, however difficult to fully assess due to patient's position in bed

## 2018-04-19 NOTE — PROGRESS NOTE ADULT - SUBJECTIVE AND OBJECTIVE BOX
Patient is a 82y old  Female who presented with a chief complaint of nausea and vomiting, inability to tolerate PO (16 Apr 2018 14:16)    INTERVAL HPI/OVERNIGHT EVENTS:  still with abdominal pain, improved with analgesics  appetite remains very poor    MEDICATIONS  (STANDING):  dextrose 5% + sodium chloride 0.45%. 1000 milliLiter(s) (75 mL/Hr) IV Continuous <Continuous>  docusate sodium 100 milliGRAM(s) Oral three times a day  enoxaparin Injectable 80 milliGRAM(s) SubCutaneous two times a day  famotidine  IVPB 20 milliGRAM(s) IV Intermittent once  mirtazapine 15 milliGRAM(s) Oral at bedtime  pantoprazole   Suspension 40 milliGRAM(s) Oral before breakfast  polyethylene glycol 3350 17 Gram(s) Oral daily    MEDICATIONS  (PRN):  acetaminophen   Tablet. 650 milliGRAM(s) Oral every 8 hours PRN Moderate Pain (4 - 6)  ALBUTerol    90 MICROgram(s) HFA Inhaler 2 Puff(s) Inhalation every 6 hours PRN Shortness of Breath and/or Wheezing  morphine  - Injectable 2 milliGRAM(s) IV Push every 6 hours PRN Severe Pain (7 - 10)  morphine  - Injectable 1 milliGRAM(s) IV Push every 3 hours PRN Moderate Pain (4 - 6)  ondansetron Injectable 4 milliGRAM(s) IV Push every 6 hours PRN Nausea and/or Vomiting    Allergies  Levaquin (Other)    Review of Systems:  General:  no fever or chills  CV:  No pain, palpitatioins, hypo/hypertension  Resp:  No dyspnea, cough, tachypnea, wheezing  : no pain, incontinence, nocturia  Muscle:  No pain, weakness  Neuro:  No weakness, tingling, memory problems  Psych:  No fatigue, insomnia, mood problems, depression  Endocrine:  No polyuria, polydypsia, cold/heat intolerance  Heme:  No petechiae, ecchymosis, easy bruisability  Skin:  No rash, tattoos, scars, edema    T(F): 98.3 (04-19-18 @ 07:29), Max: 98.3 (04-18-18 @ 11:43)  HR: 65 (04-19-18 @ 07:29) (63 - 67)  BP: 144/79 (04-19-18 @ 07:29) (138/79 - 146/75)  RR: 20 (04-19-18 @ 07:29) (18 - 20)  SpO2: 96% (04-19-18 @ 07:29) (96% - 96%)  Wt(kg): --  ,   I&O's Summary    18 Apr 2018 07:01  -  19 Apr 2018 07:00  --------------------------------------------------------  IN: 1300 mL / OUT: 0 mL / NET: 1300 mL    PHYSICAL EXAM:  Constitutional: NAD, well-developed, appears comfortable  Neck: supple  Respiratory: grossly clear  Cardiovascular: S1 and S2, RRR  Gastrointestinal: BS+, soft obese, neg HSM, no palpable mass, no rebound, guarding or rigidity  Extremities: No peripheral edema, neg clubbing, cyanosis  Vascular: 2+ peripheral pulses  Neurological: A/O x 3, no focal deficits  Psychiatric: Normal mood, normal affect  Skin: No rashes, anicteric    LABS:                        9.2    5.04  )-----------( 126      ( 18 Apr 2018 09:16 )             27.8     04-18    137  |  101  |  4<L>  ----------------------------<  130<H>  3.7   |  24  |  0.94    Ca    9.1      18 Apr 2018 07:48    TPro  6.2  /  Alb  3.2<L>  /  TBili  1.8<H>  /  DBili  x   /  AST  131<H>  /  ALT  100<H>  /  AlkPhos  539<H>  04-18    PTT - ( 16 Apr 2018 22:57 )  PTT:38.6 sec      RADIOLOGY & ADDITIONAL TESTS:  < from: US Abdomen Complete (04.17.18 @ 10:39) >    INTERPRETATION:  CLINICAL INFORMATION: Diffuse abdominal pain.    COMPARISON: CT abdomen/pelvis 4/15/2018.    TECHNIQUE: Sonography of the abdomen.     FINDINGS:    Liver: Mild to moderate intrahepatic biliary ductal dilatation. No focal   liver mass seen.    Bile ducts: Mild to moderate intrahepatic and extrahepatic biliary ductal   dilatation. Common bile duct measures up to 13 mm.     Gallbladder: Distended with intraluminal sludge. Mild wall thickening,   measuring 4 mm. Small volume pericholecystic fluid.     Pancreas: Redemonstration of an ill-defined pancreatic head mass,   measuring approximately 2.0 x 3.3 x 3.5. Atrophic pancreatic body and  tail. The pancreatic duct is dilated to 6 mm.    Spleen: 11.6 cm. Within normal limits.    Right kidney: 8.9 cm. No hydronephrosis.        Left kidney: 9.8 cm.  No hydronephrosis.        Ascites: Small volume perihepatic and perisplenic ascites.    Aorta and IVC: Visualized portions are within normal limits.    Miscellaneous: Small right pleural effusion.    IMPRESSION:     Redemonstration of an ill-defined pancreatic head mass. There is   resulting pancreatic and biliary ductal dilatation.    Distended gallbladder with intraluminal sludge, wall thickening,   pericholecystic fluid.     Small volume intraperitoneal ascites and small right pleural effusion.

## 2018-04-19 NOTE — PROGRESS NOTE ADULT - SUBJECTIVE AND OBJECTIVE BOX
Patient is a 82y old  Female who presents with a chief complaint of nausea and vomiting, inability to tolerate PO (2018 14:16)                                                               INTERVAL HPI/OVERNIGHT EVENTS:  n/v today one episode after meds     REVIEW OF SYSTEMS:     CONSTITUTIONAL: No weakness, fevers or chills  RESPIRATORY: No cough, wheezing,  No shortness of breath  CARDIOVASCULAR: No chest pain or palpitations  GASTROINTESTINAL:  abdominal pain  .  nausea, vomiting,no BM  GENITOURINARY: No dysuria, frequency or hematuria  NEUROLOGICAL: No numbness or weakness                                                                                                                                                                                                                                                                                   Medications:  MEDICATIONS  (STANDING):  dextrose 5% + sodium chloride 0.45%. 1000 milliLiter(s) (75 mL/Hr) IV Continuous <Continuous>  docusate sodium 100 milliGRAM(s) Oral three times a day  dronabinol 2.5 milliGRAM(s) Oral two times a day  enoxaparin Injectable 80 milliGRAM(s) SubCutaneous two times a day  famotidine  IVPB 20 milliGRAM(s) IV Intermittent once  mirtazapine 15 milliGRAM(s) Oral at bedtime  ondansetron Injectable 4 milliGRAM(s) IV Push three times a day  pantoprazole   Suspension 40 milliGRAM(s) Oral before breakfast  polyethylene glycol 3350 17 Gram(s) Oral daily    MEDICATIONS  (PRN):  acetaminophen   Tablet. 650 milliGRAM(s) Oral every 8 hours PRN Mild Pain (1 - 3)  ALBUTerol    90 MICROgram(s) HFA Inhaler 2 Puff(s) Inhalation every 6 hours PRN Shortness of Breath and/or Wheezing  morphine  - Injectable 2 milliGRAM(s) IV Push every 6 hours PRN Severe Pain (7 - 10)  morphine  - Injectable 1 milliGRAM(s) IV Push every 3 hours PRN Moderate Pain (4 - 6)  ondansetron Injectable 4 milliGRAM(s) IV Push daily PRN Nausea and/or Vomiting  oxyCODONE    IR 5 milliGRAM(s) Oral every 4 hours PRN Moderate Pain (4 - 6)       Allergies    Levaquin (Other)    Intolerances      Vital Signs Last 24 Hrs  T(C): 36.8 (2018 07:29), Max: 36.8 (2018 16:30)  T(F): 98.3 (2018 07:29), Max: 98.3 (2018 07:29)  HR: 65 (2018 07:29) (63 - 65)  BP: 144/79 (2018 07:29) (144/79 - 146/75)  BP(mean): --  RR: 20 (2018 07:29) (18 - 20)  SpO2: 96% (2018 07:29) (96% - 96%)  CAPILLARY BLOOD GLUCOSE           @ 07:  -   @ 07:00  --------------------------------------------------------  IN: 1300 mL / OUT: 0 mL / NET: 1300 mL     @ 07:  -   @ 15:33  --------------------------------------------------------  IN: 500 mL / OUT: 0 mL / NET: 500 mL      Physical Exam:      Daily Weight in k.8 (2018 09:38)  General:  mod distress sec to Nausea   HEENT:  Nonicteric, PERRLA  CV:  RRR, S1S2   Lungs:  CTA \  Abdomen:  Soft, tender epig   Extremities:  2+ pulses, no c/c, no edema  Skin:  Warm and dry, no rashes  :  No metzger  Neuro:  AAOx3, non-focal, grossly intact                                                                                                                                                                                                                                                                                                LABS:                               9.2    5.34  )-----------( 120      ( 2018 07:30 )             27.2                          135  |  98  |  <4<L>  ----------------------------<  134<H>  3.3<L>   |  24  |  0.84    Ca    8.9      2018 06:39    TPro  6.0  /  Alb  3.2<L>  /  TBili  1.7<H>  /  DBili  x   /  AST  110<H>  /  ALT  97<H>  /  AlkPhos  512<H>

## 2018-04-19 NOTE — DIETITIAN INITIAL EVALUATION ADULT. - FACTORS AFF FOOD INTAKE
states even the thought of water is making her nauseous; states she does not feel anti-nausea medication has been helping; reports she has not had a BM in about 1 week and does feel she needs to have a BM but is not able to have one, states at home she was going between diarrhea and constipation since she has a h/o anal cancer; Pt denies any chewing/swallowing issues/persistent nausea

## 2018-04-19 NOTE — DIETITIAN INITIAL EVALUATION ADULT. - PROBLEM SELECTOR PLAN 3
Patient reports having initial evaluation for treatment at [facility] and planning for initiation of treatment within the coming weeks  - Continue Oncology consult regarding management, anticoagulation as per family request

## 2018-04-19 NOTE — DIETITIAN INITIAL EVALUATION ADULT. - PROBLEM SELECTOR PLAN 6
[facility] [phone]  - Family and patient unaware of medications and dosages  - Likely on Simvastatin, 325mg Aspirin, Valsartan, and Levothyroxine in addition to multivitamins

## 2018-04-19 NOTE — PROGRESS NOTE ADULT - ASSESSMENT
The pt is an 82F with hx HTN, asthma, anal cancer s/p radiation/chemo with recent diagnosis of pancreatic cancer, was scheduled to start chemo at [place] 4/18, now presents with nausea/vomiting/abdominal pain.    #Pancreatic cancer- recent diagnosis, was to start chemotherapy gemzar/abraxane at [place] on 4/18; with increased abdominal pain/n/v  - CT with intrahepatic and extrahepatic biliary dilation- transaminases sl high, bilirubin elevated but stable - monitor LFTs daily  - on morphine prn for pain- added oral option with oxycodone for DC planning purposes  - will review records obtained from [facility] d/w oncologist at [place] that evaluated patient  - GI following- celiac plexus block a possibility depending on course- currently probably not needed, does not need stent at this point  - Patient would prefer first cycle of chemo as inpatient. Will tentatively plan for tomorrow. Written consent was obtained. 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 Discussed option of NOAC, although not yet approved for thrombosis related to malignancy, but likley just as good, There are ongoing studies. THis was discussed with the atient and she would prefer oral NOAC for discharge. Would suggest Xarelto 15mg bid x 21 days then 20mg daily.    #constipation  - likely opiate induced - cont aggressive bowel regimen    d/w daughter at bedside  D/W Dr. Augustin   D/W RN and NP

## 2018-04-19 NOTE — DIETITIAN INITIAL EVALUATION ADULT. - ORAL INTAKE PTA
poor/Pt reports decreased appetite and intake over the last few weeks at home, partially due to nausea and vomiting right before admission; prior to lack of appetite she was eating "anything and everything" and had no diet restrictions

## 2018-04-19 NOTE — DIETITIAN INITIAL EVALUATION ADULT. - PROBLEM SELECTOR PLAN 2
- Continue zofran as needed for nausea, add PPI IV for dyspepsia as unable to tolerate PO  - Clear liquid diet and advance as tolerated  - Consider nutrition consult if continues to have limited PO intake

## 2018-04-19 NOTE — PROGRESS NOTE ADULT - ASSESSMENT
81 y/o female w hx of anal cancer in remission s/p radiation and chemo , HTN,Asthma , recently evaluated for abd pain and found to have panreatic mass with CT done on 3/27 showing 3.4 cm pancreatic head mass with moderaltely distended bladder , no dilatation of bile duct ( at the time ), and ecasemenet of adjacement vasculature , underwent EUS with bx and path consistent with pancreatic Ca per family , shceduled to start chemo at [place] this week however presenting now with abd pain, N/V with no fever .. CT shows dilatation of biliary duct but nl Bili.. no fever or chills..   1- Abd pain /N/v : sec to cancer .. pt c/o Nausea and had one episode of vommiting post meds   .. cont pain meds . change zofran to AC .  celiac plexus  block   2- pancreatic Ca : not likley to be a surgical candidate .. plan for chemo.. mild increase in TB however no further increase : will monitor .: may need Stent placement. ..  can consider dcing with plans for o/p chemo however will cont to monitor TB at this time :    3- DVT of < from: CT Abdomen and Pelvis w/ IV Cont (04.15.18 @ 21:49) >  Main portal vein thrombus. Eccentric thrombus in the distal superior   mesenteric vein.  cont lovenox : consider changing to eliquis upon dc since pt and family not interested in lovenox     4- anemia : monitor H/H 81 y/o female w hx of anal cancer in remission s/p radiation and chemo , HTN,Asthma , recently evaluated for abd pain and found to have panreatic mass with CT done on 3/27 showing 3.4 cm pancreatic head mass with moderaltely distended bladder , no dilatation of bile duct ( at the time ), and ecasemenet of adjacement vasculature , underwent EUS with bx and path consistent with pancreatic Ca per family , shceduled to start chemo at [place] this week however presenting now with abd pain, N/V with no fever .. CT shows dilatation of biliary duct but nl Bili.. no fever or chills..   1- Abd pain /N/v : sec to cancer .. pt c/o Nausea and had one episode of vommiting post meds   .. cont pain meds . change zofran to AC .  celiac plexus  block   2- pancreatic Ca : not likley to be a surgical candidate .. plan for chemo.. mild increase in TB however no further increase : will monitor .: may need Stent placement. ..  d/w Onco : consider starting chemo inpt   3- DVT of < from: CT Abdomen and Pelvis w/ IV Cont (04.15.18 @ 21:49) >  Main portal vein thrombus. Eccentric thrombus in the distal superior   mesenteric vein.  cont lovenox : consider changing to eliquis vs xarelto upon dc since pt and family not interested in lovenox     4- anemia : monitor H/H   5- thrombocytopenia : monitor for now .. pt reports self limited nose bleed this am otherwise mild and self-limited

## 2018-04-19 NOTE — CHART NOTE - NSCHARTNOTEFT_GEN_A_CORE
Upon Nutritional Assessment by the Registered Dietitian your patient was determined to meet criteria / has evidence of the following diagnosis/diagnoses:          [ ]  Mild Protein Calorie Malnutrition        [ ]  Moderate Protein Calorie Malnutrition        [x] Severe Protein Calorie Malnutrition        [ ] Unspecified Protein Calorie Malnutrition        [ ] Underweight / BMI <19        [ ] Morbid Obesity / BMI > 40      Findings as based on:  [x] Comprehensive nutrition assessment- catabolic illness, prolonged suboptimal PO intake and appetite   [x] Nutrition Focused Physical Exam  [ ] Other:       Nutrition Plan/Recommendations:      Continue c supplements, increase PO intake as tolerated.     PROVIDER Section:     By signing this assessment you are acknowledging and agree with the diagnosis/diagnoses assigned by the Registered Dietitian    Comments:

## 2018-04-19 NOTE — PROGRESS NOTE ADULT - SUBJECTIVE AND OBJECTIVE BOX
Chief Complaint:  fu  History of Present Illness:  pt seen earlier today on rounds.    abdominal pain better, used morphine overnight, still no BM, some nausea, not eating at all, depressed  daughter at bedside    MEDICATIONS  (STANDING):  dextrose 5% + sodium chloride 0.45%. 1000 milliLiter(s) (75 mL/Hr) IV Continuous <Continuous>  docusate sodium 100 milliGRAM(s) Oral three times a day  dronabinol 2.5 milliGRAM(s) Oral two times a day  enoxaparin Injectable 80 milliGRAM(s) SubCutaneous two times a day  famotidine  IVPB 20 milliGRAM(s) IV Intermittent once  mirtazapine 15 milliGRAM(s) Oral at bedtime  ondansetron Injectable 4 milliGRAM(s) IV Push three times a day  pantoprazole   Suspension 40 milliGRAM(s) Oral before breakfast  polyethylene glycol 3350 17 Gram(s) Oral daily  potassium chloride  10 mEq/50 mL IVPB 10 milliEquivalent(s) IV Intermittent every 4 hours    MEDICATIONS  (PRN):  acetaminophen   Tablet. 650 milliGRAM(s) Oral every 8 hours PRN Mild Pain (1 - 3)  ALBUTerol    90 MICROgram(s) HFA Inhaler 2 Puff(s) Inhalation every 6 hours PRN Shortness of Breath and/or Wheezing  morphine  - Injectable 2 milliGRAM(s) IV Push every 6 hours PRN Severe Pain (7 - 10)  morphine  - Injectable 1 milliGRAM(s) IV Push every 3 hours PRN Moderate Pain (4 - 6)  ondansetron Injectable 4 milliGRAM(s) IV Push daily PRN Nausea and/or Vomiting  oxyCODONE    IR 5 milliGRAM(s) Oral every 4 hours PRN Moderate Pain (4 - 6)      Allergies    Levaquin (Other)    Intolerances        Vital Signs Last 24 Hrs  T(C): 36.9 (19 Apr 2018 16:30), Max: 36.9 (19 Apr 2018 16:30)  T(F): 98.4 (19 Apr 2018 16:30), Max: 98.4 (19 Apr 2018 16:30)  HR: 64 (19 Apr 2018 16:30) (64 - 65)  BP: 131/82 (19 Apr 2018 16:30) (131/82 - 146/68)  BP(mean): --  RR: 18 (19 Apr 2018 16:30) (18 - 20)  SpO2: 97% (19 Apr 2018 16:30) (96% - 97%)    PHYSICAL EXAM  General: adult in NAD  HEENT: clear oropharynx, anicteric sclera, pink conjunctiva  CV: normal S1/S2 with no murmur rubs or gallops  Lungs: clear to auscultation, no wheezes, no rales  Abdomen: soft, slight epigastric tenderness, non-distended, positive bowel sounds  Ext: no clubbing cyanosis or edema  Skin: no rashes and no petechiae    LABS:                          9.2    5.34  )-----------( 120      ( 19 Apr 2018 07:30 )             27.2         Mean Cell Volume : 92.2 fl  Mean Cell Hemoglobin : 31.2 pg  Mean Cell Hemoglobin Concentration : 33.8 gm/dL  Auto Neutrophil # : x  Auto Lymphocyte # : x  Auto Monocyte # : x  Auto Eosinophil # : x  Auto Basophil # : x  Auto Neutrophil % : x  Auto Lymphocyte % : x  Auto Monocyte % : x  Auto Eosinophil % : x  Auto Basophil % : x      Serial CBC's  04-19 @ 07:30  Hct-27.2 / Hgb-9.2 / Plat-120 / RBC-2.95 / WBC-5.34  Serial CBC's  04-18 @ 09:16  Hct-27.8 / Hgb-9.2 / Plat-126 / RBC-2.97 / WBC-5.04  Serial CBC's  04-17 @ 07:41  Hct-27.0 / Hgb-8.6 / Plat-127 / RBC-2.82 / WBC-5.91  Serial CBC's  04-16 @ 06:23  Hct-26.3 / Hgb-8.8 / Plat-107 / RBC-2.73 / WBC-7.4  Serial CBC's  04-15 @ 19:11  Hct-27.8 / Hgb-9.2 / Plat-132 / RBC-2.86 / WBC-7.9      04-19    135  |  98  |  <4<L>  ----------------------------<  134<H>  3.3<L>   |  24  |  0.84    Ca    8.9      19 Apr 2018 06:39    TPro  6.0  /  Alb  3.2<L>  /  TBili  1.7<H>  /  DBili  x   /  AST  110<H>  /  ALT  97<H>  /  AlkPhos  512<H>  04-19          Folate, Serum: >20.0 ng/mL (04-18 @ 10:59)  Iron - Total Binding Capacity.: 244 ug/dL (04-18 @ 10:59)  Ferritin, Serum: 163 ng/mL (04-18 @ 10:59)

## 2018-04-20 LAB
ALBUMIN SERPL ELPH-MCNC: 3.2 G/DL — LOW (ref 3.3–5)
ALP SERPL-CCNC: 573 U/L — HIGH (ref 40–120)
ALT FLD-CCNC: 106 U/L — HIGH (ref 10–45)
ANION GAP SERPL CALC-SCNC: 11 MMOL/L — SIGNIFICANT CHANGE UP (ref 5–17)
AST SERPL-CCNC: 133 U/L — HIGH (ref 10–40)
BILIRUB SERPL-MCNC: 2.5 MG/DL — HIGH (ref 0.2–1.2)
BUN SERPL-MCNC: <4 MG/DL — LOW (ref 7–23)
CALCIUM SERPL-MCNC: 9 MG/DL — SIGNIFICANT CHANGE UP (ref 8.4–10.5)
CHLORIDE SERPL-SCNC: 97 MMOL/L — SIGNIFICANT CHANGE UP (ref 96–108)
CO2 SERPL-SCNC: 24 MMOL/L — SIGNIFICANT CHANGE UP (ref 22–31)
CREAT SERPL-MCNC: 0.81 MG/DL — SIGNIFICANT CHANGE UP (ref 0.5–1.3)
GLUCOSE SERPL-MCNC: 124 MG/DL — HIGH (ref 70–99)
HCT VFR BLD CALC: 28 % — LOW (ref 34.5–45)
HGB BLD-MCNC: 9.1 G/DL — LOW (ref 11.5–15.5)
MCHC RBC-ENTMCNC: 31 PG — SIGNIFICANT CHANGE UP (ref 27–34)
MCHC RBC-ENTMCNC: 32.5 GM/DL — SIGNIFICANT CHANGE UP (ref 32–36)
MCV RBC AUTO: 95.2 FL — SIGNIFICANT CHANGE UP (ref 80–100)
PLATELET # BLD AUTO: 129 K/UL — LOW (ref 150–400)
POTASSIUM SERPL-MCNC: 3.6 MMOL/L — SIGNIFICANT CHANGE UP (ref 3.5–5.3)
POTASSIUM SERPL-SCNC: 3.6 MMOL/L — SIGNIFICANT CHANGE UP (ref 3.5–5.3)
PROT SERPL-MCNC: 6 G/DL — SIGNIFICANT CHANGE UP (ref 6–8.3)
RBC # BLD: 2.94 M/UL — LOW (ref 3.8–5.2)
RBC # FLD: 15.8 % — HIGH (ref 10.3–14.5)
SODIUM SERPL-SCNC: 132 MMOL/L — LOW (ref 135–145)
WBC # BLD: 4.61 K/UL — SIGNIFICANT CHANGE UP (ref 3.8–10.5)
WBC # FLD AUTO: 4.61 K/UL — SIGNIFICANT CHANGE UP (ref 3.8–10.5)

## 2018-04-20 RX ADMIN — Medication 2.5 MILLIGRAM(S): at 17:56

## 2018-04-20 RX ADMIN — PANTOPRAZOLE SODIUM 40 MILLIGRAM(S): 20 TABLET, DELAYED RELEASE ORAL at 06:00

## 2018-04-20 RX ADMIN — ENOXAPARIN SODIUM 80 MILLIGRAM(S): 100 INJECTION SUBCUTANEOUS at 21:20

## 2018-04-20 RX ADMIN — ONDANSETRON 4 MILLIGRAM(S): 8 TABLET, FILM COATED ORAL at 08:30

## 2018-04-20 RX ADMIN — ONDANSETRON 4 MILLIGRAM(S): 8 TABLET, FILM COATED ORAL at 12:08

## 2018-04-20 RX ADMIN — MORPHINE SULFATE 2 MILLIGRAM(S): 50 CAPSULE, EXTENDED RELEASE ORAL at 18:42

## 2018-04-20 RX ADMIN — SODIUM CHLORIDE 75 MILLILITER(S): 9 INJECTION, SOLUTION INTRAVENOUS at 12:44

## 2018-04-20 RX ADMIN — Medication 2.5 MILLIGRAM(S): at 06:00

## 2018-04-20 RX ADMIN — Medication 100 MILLIGRAM(S): at 17:57

## 2018-04-20 RX ADMIN — ENOXAPARIN SODIUM 80 MILLIGRAM(S): 100 INJECTION SUBCUTANEOUS at 09:52

## 2018-04-20 RX ADMIN — Medication 100 MILLIGRAM(S): at 06:00

## 2018-04-20 RX ADMIN — ONDANSETRON 4 MILLIGRAM(S): 8 TABLET, FILM COATED ORAL at 17:52

## 2018-04-20 RX ADMIN — MIRTAZAPINE 15 MILLIGRAM(S): 45 TABLET, ORALLY DISINTEGRATING ORAL at 21:20

## 2018-04-20 NOTE — PROGRESS NOTE ADULT - ASSESSMENT
81 y/o female w hx of anal cancer in remission s/p radiation and chemo , HTN,Asthma , recently evaluated for abd pain and found to have panreatic mass with CT done on 3/27 showing 3.4 cm pancreatic head mass with moderaltely distended bladder , no dilatation of bile duct ( at the time ), and ecasemenet of adjacement vasculature , underwent EUS with bx and path consistent with pancreatic Ca per family , shceduled to start chemo at [place] this week however presenting now with abd pain, N/V with no fever .. CT shows dilatation of biliary duct but nl Bili.. no fever or chills..   1- Abd pain /N/v : sec to cancer .. pt still c/o Nausea  .. cont pain meds . changed to zofran .consider compazine   consider  celiac plexus  block   2- pancreatic Ca : not likley to be a surgical candidate ..   TB continues to rise ..plan to hold on cemo for now..  d/w Dr. Zurita : will need stent placement .. called and D/W  .. f/u input   d/w Onco : holding off on chemo  3- DVT of < from: CT Abdomen and Pelvis w/ IV Cont (04.15.18 @ 21:49) >  Main portal vein thrombus. Eccentric thrombus in the distal superior   mesenteric vein.  cont lovenox : consider changing to eliquis vs xarelto upon dc since pt and family not interested in lovenox   will need to change to heparin priro to ERCP    4- anemia : monitor H/H   5- thrombocytopenia : monitor for now .. pt reports self limited nose bleed this am otherwise mild and self-limited

## 2018-04-20 NOTE — PROGRESS NOTE ADULT - SUBJECTIVE AND OBJECTIVE BOX
Patient is a 82y old  Female who presented with a chief complaint of nausea and vomiting, inability to tolerate PO (16 Apr 2018 14:16)    INTERVAL HPI/OVERNIGHT EVENTS:  Appetite remains poor    MEDICATIONS  (STANDING):  dextrose 5% + sodium chloride 0.45%. 1000 milliLiter(s) (75 mL/Hr) IV Continuous <Continuous>  docusate sodium 100 milliGRAM(s) Oral three times a day  enoxaparin Injectable 80 milliGRAM(s) SubCutaneous two times a day  famotidine  IVPB 20 milliGRAM(s) IV Intermittent once  mirtazapine 15 milliGRAM(s) Oral at bedtime  pantoprazole   Suspension 40 milliGRAM(s) Oral before breakfast  polyethylene glycol 3350 17 Gram(s) Oral daily    MEDICATIONS  (PRN):  acetaminophen   Tablet. 650 milliGRAM(s) Oral every 8 hours PRN Moderate Pain (4 - 6)  ALBUTerol    90 MICROgram(s) HFA Inhaler 2 Puff(s) Inhalation every 6 hours PRN Shortness of Breath and/or Wheezing  morphine  - Injectable 2 milliGRAM(s) IV Push every 6 hours PRN Severe Pain (7 - 10)  morphine  - Injectable 1 milliGRAM(s) IV Push every 3 hours PRN Moderate Pain (4 - 6)  ondansetron Injectable 4 milliGRAM(s) IV Push every 6 hours PRN Nausea and/or Vomiting    Allergies  Levaquin (Other)    Review of Systems:  General:  no fever or chills  CV:  No pain, palpitatioins, hypo/hypertension  Resp:  No dyspnea, cough, tachypnea, wheezing  : no pain, incontinence, nocturia  Muscle:  No pain, weakness  Neuro:  No weakness, tingling, memory problems  Psych:  No fatigue, insomnia, mood problems, depression  Endocrine:  No polyuria, polydypsia, cold/heat intolerance  Heme:  No petechiae, ecchymosis, easy bruisability  Skin:  No rash, tattoos, scars, edema    T(F): 97.8 (04-20-18 @ 07:36), Max: 98.4 (04-19-18 @ 16:30)  HR: 60 (04-20-18 @ 07:36) (60 - 64)  BP: 128/76 (04-20-18 @ 07:36) (128/76 - 155/84)  RR: 20 (04-20-18 @ 07:36) (18 - 20)  SpO2: 97% (04-20-18 @ 07:36) (96% - 97%)  Wt(kg): --  ,   I&O's Summary    19 Apr 2018 07:01  -  20 Apr 2018 07:00  --------------------------------------------------------  IN: 1592 mL / OUT: 0 mL / NET: 1592 mL    PHYSICAL EXAM:  Constitutional: NAD, well-developed, appears comfortable  Neck: supple  Respiratory: grossly clear  Cardiovascular: S1 and S2, RRR  Gastrointestinal: BS+, soft obese, neg HSM, no palpable mass, no rebound, guarding or rigidity  Extremities: No peripheral edema, neg clubbing, cyanosis  Vascular: 2+ peripheral pulses  Neurological: A/O x 3, no focal deficits  Psychiatric: Normal mood, normal affect  Skin: No rashes, anicteric    LABS:                      9.1    4.61  )-----------( 129      ( 20 Apr 2018 09:37 )             28.0               04-20    132<L>  |  97  |  <4<L>  ----------------------------<  124<H>  3.6   |  24  |  0.81    Ca    9.0      20 Apr 2018 07:20    TPro  6.0  /  Alb  3.2<L>  /  TBili  2.5<H>  /  DBili  x   /  AST  133<H>  /  ALT  106<H>  /  AlkPhos  573<H>  04-20      RADIOLOGY & ADDITIONAL TESTS:  < from: US Abdomen Complete (04.17.18 @ 10:39) >    INTERPRETATION:  CLINICAL INFORMATION: Diffuse abdominal pain.    COMPARISON: CT abdomen/pelvis 4/15/2018.    TECHNIQUE: Sonography of the abdomen.     FINDINGS:    Liver: Mild to moderate intrahepatic biliary ductal dilatation. No focal   liver mass seen.    Bile ducts: Mild to moderate intrahepatic and extrahepatic biliary ductal   dilatation. Common bile duct measures up to 13 mm.     Gallbladder: Distended with intraluminal sludge. Mild wall thickening,   measuring 4 mm. Small volume pericholecystic fluid.     Pancreas: Redemonstration of an ill-defined pancreatic head mass,   measuring approximately 2.0 x 3.3 x 3.5. Atrophic pancreatic body and  tail. The pancreatic duct is dilated to 6 mm.    Spleen: 11.6 cm. Within normal limits.    Right kidney: 8.9 cm. No hydronephrosis.        Left kidney: 9.8 cm.  No hydronephrosis.        Ascites: Small volume perihepatic and perisplenic ascites.    Aorta and IVC: Visualized portions are within normal limits.    Miscellaneous: Small right pleural effusion.    IMPRESSION:     Redemonstration of an ill-defined pancreatic head mass. There is   resulting pancreatic and biliary ductal dilatation.    Distended gallbladder with intraluminal sludge, wall thickening,   pericholecystic fluid.     Small volume intraperitoneal ascites and small right pleural effusion.

## 2018-04-20 NOTE — CHART NOTE - NSCHARTNOTEFT_GEN_A_CORE
Malnutrition Follow Up     Pt c pancreatic cancer, chemo held at this time due to rising bilirubin, interim events noted.       Source: Patient [x]       Diet : low sodium, soft, Ensure Clear x 2 daily       Patient reports [x] nausea persists, no vomiting, states thought of food continues to make her nauseous [x] constipation- has been passing gas but otherwise no BM yet  [x] other: she had a few bites of her breakfast this morning but not much else, has not tried Ensure Clear yet-discussed importance. Discussed importance of trying to eat small, frequent bland meals to prevent/lessen nausea as well.       Current Weight: Weight (kg): 76 (04-16 @ 16:41), no new wt       Pertinent Medications: MEDICATIONS  (STANDING):  dextrose 5% + sodium chloride 0.45%. 1000 milliLiter(s) (75 mL/Hr) IV Continuous <Continuous>  docusate sodium 100 milliGRAM(s) Oral three times a day  dronabinol 2.5 milliGRAM(s) Oral two times a day  enoxaparin Injectable 80 milliGRAM(s) SubCutaneous two times a day  famotidine  IVPB 20 milliGRAM(s) IV Intermittent once  mirtazapine 15 milliGRAM(s) Oral at bedtime  ondansetron Injectable 4 milliGRAM(s) IV Push three times a day  pantoprazole   Suspension 40 milliGRAM(s) Oral before breakfast  polyethylene glycol 3350 17 Gram(s) Oral daily    MEDICATIONS  (PRN):  acetaminophen   Tablet. 650 milliGRAM(s) Oral every 8 hours PRN Mild Pain (1 - 3)  ALBUTerol    90 MICROgram(s) HFA Inhaler 2 Puff(s) Inhalation every 6 hours PRN Shortness of Breath and/or Wheezing  morphine  - Injectable 2 milliGRAM(s) IV Push every 6 hours PRN Severe Pain (7 - 10)  morphine  - Injectable 1 milliGRAM(s) IV Push every 3 hours PRN Moderate Pain (4 - 6)  ondansetron Injectable 4 milliGRAM(s) IV Push daily PRN Nausea and/or Vomiting  oxyCODONE    IR 5 milliGRAM(s) Oral every 4 hours PRN Moderate Pain (4 - 6)    Pertinent Labs:  04-20 Na132 mmol/L<L> Glu 124 mg/dL<H> K+ 3.6 mmol/L Cr  0.81 mg/dL BUN <4 mg/dL<L> 04-15 Phos 2.5 mg/dL 04-20 Alb 3.2 g/dL<L>      Skin: intact, no edema noted at this time     Estimated Needs:   [x] no change since previous assessment      Previous Nutrition Diagnosis:      [x] Malnutrition     Nutrition Diagnosis is [x] ongoing-to improve once Pt accepts PO diet and supplements            New Nutrition Diagnosis: [x] not applicable        Recommend    [x] Continue c current diet.     [ x Nutrition Supplement: Continue c Ensure Clear- discussed taking it over ice or chilled for best taste.     [x] Encouraged bland foods at this time.        Monitoring and Evaluation:     [x] PO intake [x] Tolerance to diet prescription [x] weights [x] follow up per protocol    RD remains available.   Chasity Jones, MS, RD, , [facility] #[phone]

## 2018-04-20 NOTE — PROGRESS NOTE ADULT - SUBJECTIVE AND OBJECTIVE BOX
Chief Complaint: fu    History of Present Illness: sitting upright in chair; last Morphine dose last evening- feels slight pain but not enough for morphine; no f/c, no dyspnea/cough, +nausea- taking very little PO; no BM, no bleeding, no leg pain, no dizziness      MEDICATIONS  (STANDING):  dextrose 5% + sodium chloride 0.45%. 1000 milliLiter(s) (75 mL/Hr) IV Continuous <Continuous>  docusate sodium 100 milliGRAM(s) Oral three times a day  dronabinol 2.5 milliGRAM(s) Oral two times a day  enoxaparin Injectable 80 milliGRAM(s) SubCutaneous two times a day  famotidine  IVPB 20 milliGRAM(s) IV Intermittent once  mirtazapine 15 milliGRAM(s) Oral at bedtime  ondansetron Injectable 4 milliGRAM(s) IV Push three times a day  pantoprazole   Suspension 40 milliGRAM(s) Oral before breakfast  polyethylene glycol 3350 17 Gram(s) Oral daily    MEDICATIONS  (PRN):  acetaminophen   Tablet. 650 milliGRAM(s) Oral every 8 hours PRN Mild Pain (1 - 3)  ALBUTerol    90 MICROgram(s) HFA Inhaler 2 Puff(s) Inhalation every 6 hours PRN Shortness of Breath and/or Wheezing  morphine  - Injectable 2 milliGRAM(s) IV Push every 6 hours PRN Severe Pain (7 - 10)  morphine  - Injectable 1 milliGRAM(s) IV Push every 3 hours PRN Moderate Pain (4 - 6)  ondansetron Injectable 4 milliGRAM(s) IV Push daily PRN Nausea and/or Vomiting  oxyCODONE    IR 5 milliGRAM(s) Oral every 4 hours PRN Moderate Pain (4 - 6)      Allergies    Levaquin (Other)    Intolerances        Vital Signs Last 24 Hrs  T(C): 36.6 (20 Apr 2018 07:36), Max: 36.9 (19 Apr 2018 16:30)  T(F): 97.8 (20 Apr 2018 07:36), Max: 98.4 (19 Apr 2018 16:30)  HR: 60 (20 Apr 2018 07:36) (60 - 64)  BP: 128/76 (20 Apr 2018 07:36) (128/76 - 155/84)  BP(mean): --  RR: 20 (20 Apr 2018 07:36) (18 - 20)  SpO2: 97% (20 Apr 2018 07:36) (96% - 97%)    PHYSICAL EXAM  General: adult in NAD  HEENT: clear oropharynx, anicteric sclera, pink conjunctiva  Neck: supple  CV: normal S1/S2  Lungs: decreased BS, poor effort  Abdomen: soft non-tender non-distended, positive bowel sounds  Ext: no clubbing cyanosis or edema      LABS:                          9.1    4.61  )-----------( 129      ( 20 Apr 2018 09:37 )             28.0         Mean Cell Volume : 95.2 fl  Mean Cell Hemoglobin : 31.0 pg  Mean Cell Hemoglobin Concentration : 32.5 gm/dL  Auto Neutrophil # : x  Auto Lymphocyte # : x  Auto Monocyte # : x  Auto Eosinophil # : x  Auto Basophil # : x  Auto Neutrophil % : x  Auto Lymphocyte % : x  Auto Monocyte % : x  Auto Eosinophil % : x  Auto Basophil % : x      Serial CBC's  04-20 @ 09:37  Hct-28.0 / Hgb-9.1 / Plat-129 / RBC-2.94 / WBC-4.61  Serial CBC's  04-19 @ 07:30  Hct-27.2 / Hgb-9.2 / Plat-120 / RBC-2.95 / WBC-5.34  Serial CBC's  04-18 @ 09:16  Hct-27.8 / Hgb-9.2 / Plat-126 / RBC-2.97 / WBC-5.04  Serial CBC's  04-17 @ 07:41  Hct-27.0 / Hgb-8.6 / Plat-127 / RBC-2.82 / WBC-5.91      04-20    132<L>  |  97  |  <4<L>  ----------------------------<  124<H>  3.6   |  24  |  0.81    Ca    9.0      20 Apr 2018 07:20    TPro  6.0  /  Alb  3.2<L>  /  TBili  2.5<H>  /  DBili  x   /  AST  133<H>  /  ALT  106<H>  /  AlkPhos  573<H>  04-20          Folate, Serum: >20.0 ng/mL (04-18 @ 10:59)  Iron - Total Binding Capacity.: 244 ug/dL (04-18 @ 10:59)  Ferritin, Serum: 163 ng/mL (04-18 @ 10:59)              Radiology:    < from: US Abdomen Complete (04.17.18 @ 10:39) >  IMPRESSION:     Redemonstration of an ill-defined pancreatic head mass. There is   resulting pancreatic and biliary ductal dilatation.    Distended gallbladder with intraluminal sludge, wall thickening,   pericholecystic fluid.     Small volume intraperitoneal ascites and small right pleural effusion.

## 2018-04-20 NOTE — PROGRESS NOTE ADULT - ASSESSMENT
The pt is an 82F with hx HTN, asthma, anal cancer s/p radiation/chemo with recent diagnosis of pancreatic cancer, was scheduled to start chemo at [facility] 4/18, now presents with nausea/vomiting/abdominal pain.    #Pancreatic cancer- recent diagnosis, was to start chemotherapy gemzar/abraxane at [facility] on 4/18; with increased abdominal pain/n/v  - CT with intrahepatic and extrahepatic biliary dilation- transaminases sl high, bilirubin now rising, 2.5 today- monitor daily  - on morphine prn for pain- added oral option with oxycodone   - pathology obtained from previous biopsy, placed in chart; Dr. 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    #constipation  - likely opiate induced - cont aggressive bowel regimen    d/w daughter at bedside- Alexandrea  D/W Dr. Augustin   D/W NP

## 2018-04-20 NOTE — PROGRESS NOTE ADULT - SUBJECTIVE AND OBJECTIVE BOX
Patient is a 82y old  Female who presents with a chief complaint of nausea and vomiting, inability to tolerate PO (16 Apr 2018 14:16)                                                               REVIEW OF SYSTEMS:     CONSTITUTIONAL: No weakness, fevers or chills  RESPIRATORY: No cough, wheezing,  No shortness of breath  CARDIOVASCULAR: No chest pain or palpitations  GASTROINTESTINAL: still with abdominal pain  . nausea, no vomiting,   no BM   GENITOURINARY: No dysuria, frequency or hematuria  NEUROLOGICAL: No numbness or weakness                                                                                                                                                                                                                                                                                  Medications:  MEDICATIONS  (STANDING):  dextrose 5% + sodium chloride 0.45%. 1000 milliLiter(s) (75 mL/Hr) IV Continuous <Continuous>  docusate sodium 100 milliGRAM(s) Oral three times a day  dronabinol 2.5 milliGRAM(s) Oral two times a day  enoxaparin Injectable 80 milliGRAM(s) SubCutaneous two times a day  famotidine  IVPB 20 milliGRAM(s) IV Intermittent once  mirtazapine 15 milliGRAM(s) Oral at bedtime  ondansetron Injectable 4 milliGRAM(s) IV Push three times a day  pantoprazole   Suspension 40 milliGRAM(s) Oral before breakfast  polyethylene glycol 3350 17 Gram(s) Oral daily    MEDICATIONS  (PRN):  acetaminophen   Tablet. 650 milliGRAM(s) Oral every 8 hours PRN Mild Pain (1 - 3)  ALBUTerol    90 MICROgram(s) HFA Inhaler 2 Puff(s) Inhalation every 6 hours PRN Shortness of Breath and/or Wheezing  morphine  - Injectable 2 milliGRAM(s) IV Push every 6 hours PRN Severe Pain (7 - 10)  morphine  - Injectable 1 milliGRAM(s) IV Push every 3 hours PRN Moderate Pain (4 - 6)  ondansetron Injectable 4 milliGRAM(s) IV Push daily PRN Nausea and/or Vomiting  oxyCODONE    IR 5 milliGRAM(s) Oral every 4 hours PRN Moderate Pain (4 - 6)       Allergies    Levaquin (Other)    Intolerances      Vital Signs Last 24 Hrs  T(C): 36.9 (20 Apr 2018 15:38), Max: 36.9 (20 Apr 2018 15:38)  T(F): 98.4 (20 Apr 2018 15:38), Max: 98.4 (20 Apr 2018 15:38)  HR: 54 (20 Apr 2018 15:38) (54 - 60)  BP: 144/82 (20 Apr 2018 15:38) (128/76 - 155/84)  BP(mean): --  RR: 18 (20 Apr 2018 15:38) (18 - 20)  SpO2: 98% (20 Apr 2018 15:38) (96% - 98%)  CAPILLARY BLOOD GLUCOSE          04-19 @ 07:01 - 04-20 @ 07:00  --------------------------------------------------------  IN: 1592 mL / OUT: 0 mL / NET: 1592 mL    04-20 @ 07:01 - 04-20 @ 18:55  --------------------------------------------------------  IN: 460 mL / OUT: 0 mL / NET: 460 mL      Physical Exam:    General:  mild discomfort as being interviewed   HEENT:  Nonicteric, PERRLA  CV:  RRR, S1S2   Lungs:  CTA B/L, no wheezes, rales, rhonchi  Abdomen:  Soft, tender RU Q   Extremities:  2+ pulses, no c/c, no edema  Skin:  Warm and dry, no rashes  :  No metzger  Neuro:  AAOx3, non-focal, grossly intact                                                                                                                                                                                                                                                                                                LABS:                               9.1    4.61  )-----------( 129      ( 20 Apr 2018 09:37 )             28.0                      04-20    132<L>  |  97  |  <4<L>  ----------------------------<  124<H>  3.6   |  24  |  0.81    Ca    9.0      20 Apr 2018 07:20    TPro  6.0  /  Alb  3.2<L>  /  TBili  2.5<H>  /  DBili  x   /  AST  133<H>  /  ALT  106<H>  /  AlkPhos  573<H>  04-20                       Time spent :

## 2018-04-20 NOTE — PROGRESS NOTE ADULT - ASSESSMENT
82 year old female with history of recently diagnosed unresectable pancreatic cancer who presented with intractable abdominal pain (improving) and poor PO intolerance.    Rec-  -Pain management as above, can consider Celiac Plexus block pending clinical course  -PO diet as tolerated  -On Marinol for appetite  -Bowel regimen as ordered, likely narcotic associated constipation  -Trend LFTs daily  -Slight increase noted in TB, currently not requiring ERCP with stent, but may need in the future. Will re-address if TB continues to rise  -Heme/Onc input noted

## 2018-04-21 LAB
ALBUMIN SERPL ELPH-MCNC: 3.8 G/DL — SIGNIFICANT CHANGE UP (ref 3.3–5)
ALP SERPL-CCNC: 653 U/L — HIGH (ref 40–120)
ALT FLD-CCNC: 120 U/L — HIGH (ref 10–45)
ANION GAP SERPL CALC-SCNC: 13 MMOL/L — SIGNIFICANT CHANGE UP (ref 5–17)
AST SERPL-CCNC: 134 U/L — HIGH (ref 10–40)
BILIRUB SERPL-MCNC: 2.6 MG/DL — HIGH (ref 0.2–1.2)
BUN SERPL-MCNC: 4 MG/DL — LOW (ref 7–23)
CALCIUM SERPL-MCNC: 9.7 MG/DL — SIGNIFICANT CHANGE UP (ref 8.4–10.5)
CHLORIDE SERPL-SCNC: 96 MMOL/L — SIGNIFICANT CHANGE UP (ref 96–108)
CO2 SERPL-SCNC: 27 MMOL/L — SIGNIFICANT CHANGE UP (ref 22–31)
CREAT SERPL-MCNC: 0.97 MG/DL — SIGNIFICANT CHANGE UP (ref 0.5–1.3)
GLUCOSE SERPL-MCNC: 153 MG/DL — HIGH (ref 70–99)
HCT VFR BLD CALC: 33.3 % — LOW (ref 34.5–45)
HGB BLD-MCNC: 10.6 G/DL — LOW (ref 11.5–15.5)
MCHC RBC-ENTMCNC: 31.1 PG — SIGNIFICANT CHANGE UP (ref 27–34)
MCHC RBC-ENTMCNC: 31.8 GM/DL — LOW (ref 32–36)
MCV RBC AUTO: 97.7 FL — SIGNIFICANT CHANGE UP (ref 80–100)
PLATELET # BLD AUTO: 163 K/UL — SIGNIFICANT CHANGE UP (ref 150–400)
POTASSIUM SERPL-MCNC: 3.8 MMOL/L — SIGNIFICANT CHANGE UP (ref 3.5–5.3)
POTASSIUM SERPL-SCNC: 3.8 MMOL/L — SIGNIFICANT CHANGE UP (ref 3.5–5.3)
PROT SERPL-MCNC: 6.8 G/DL — SIGNIFICANT CHANGE UP (ref 6–8.3)
RBC # BLD: 3.41 M/UL — LOW (ref 3.8–5.2)
RBC # FLD: 16.3 % — HIGH (ref 10.3–14.5)
SODIUM SERPL-SCNC: 136 MMOL/L — SIGNIFICANT CHANGE UP (ref 135–145)
WBC # BLD: 5.31 K/UL — SIGNIFICANT CHANGE UP (ref 3.8–10.5)
WBC # FLD AUTO: 5.31 K/UL — SIGNIFICANT CHANGE UP (ref 3.8–10.5)

## 2018-04-21 RX ORDER — PROCHLORPERAZINE MALEATE 5 MG
5 TABLET ORAL EVERY 6 HOURS
Qty: 0 | Refills: 0 | Status: DISCONTINUED | OUTPATIENT
Start: 2018-04-21 | End: 2018-04-27

## 2018-04-21 RX ADMIN — ENOXAPARIN SODIUM 80 MILLIGRAM(S): 100 INJECTION SUBCUTANEOUS at 21:14

## 2018-04-21 RX ADMIN — MORPHINE SULFATE 1 MILLIGRAM(S): 50 CAPSULE, EXTENDED RELEASE ORAL at 12:51

## 2018-04-21 RX ADMIN — ONDANSETRON 4 MILLIGRAM(S): 8 TABLET, FILM COATED ORAL at 17:41

## 2018-04-21 RX ADMIN — MORPHINE SULFATE 1 MILLIGRAM(S): 50 CAPSULE, EXTENDED RELEASE ORAL at 13:21

## 2018-04-21 RX ADMIN — PANTOPRAZOLE SODIUM 40 MILLIGRAM(S): 20 TABLET, DELAYED RELEASE ORAL at 05:27

## 2018-04-21 RX ADMIN — Medication 100 MILLIGRAM(S): at 17:41

## 2018-04-21 RX ADMIN — ONDANSETRON 4 MILLIGRAM(S): 8 TABLET, FILM COATED ORAL at 05:27

## 2018-04-21 RX ADMIN — ENOXAPARIN SODIUM 80 MILLIGRAM(S): 100 INJECTION SUBCUTANEOUS at 10:24

## 2018-04-21 RX ADMIN — Medication 100 MILLIGRAM(S): at 05:27

## 2018-04-21 RX ADMIN — Medication 2.5 MILLIGRAM(S): at 05:26

## 2018-04-21 RX ADMIN — MORPHINE SULFATE 1 MILLIGRAM(S): 50 CAPSULE, EXTENDED RELEASE ORAL at 23:05

## 2018-04-21 RX ADMIN — ONDANSETRON 4 MILLIGRAM(S): 8 TABLET, FILM COATED ORAL at 12:13

## 2018-04-21 RX ADMIN — Medication 2.5 MILLIGRAM(S): at 17:41

## 2018-04-21 RX ADMIN — SODIUM CHLORIDE 75 MILLILITER(S): 9 INJECTION, SOLUTION INTRAVENOUS at 21:14

## 2018-04-21 NOTE — CONSULT NOTE ADULT - ASSESSMENT
In summary,  elderly female with pancreatic adenocarcinoma, deemed non resectable now has developed biliary obstruction and jaundice.   She will need an ERCP for biliary decompression.  I had a lengthy discussion with patient, and daughters (on Friday April 20).  I discussed the benefits and risks involved.  They agree to proceed forward.      Keep NPO after midnight Sunday night.  Hold Lovenox on Sunday and Monday.      Jules Armstrong MD

## 2018-04-21 NOTE — CONSULT NOTE ADULT - SUBJECTIVE AND OBJECTIVE BOX
Patient is a 82y old  Female who presents with a chief complaint of nausea and vomiting, inability to tolerate PO (16 Apr 2018 14:16)      HPI:  Angeline Boles is an 82 year old female with a history of hypertension, asthma, anal cancer s/p radiation and chemotherapy, recently diagnosed pancreatic cancer presents for evaluation of persistent nausea and vomiting.    Patient reports that she was recently diagnosed with pancreatic cancer while in [place] when she had a CT scan for GI-related symptoms. She returned to [place] to have treatment was planning to continue her evaluation at [facility] this upcoming Wednesday. She reports that over the past three days, she has been having episodes of abdominal pain, and nausea/emesis with attempted PO intake. Reports that the pain is 9/10 and usually occurs at night when she is laying in bed. Unable to get comfortable. Pain is in the middle of her stomach and does not radiate, is not associated with any other symptoms. Additionally during this time, she has been unable to tolerate anything by mouth including medications. With water, food, or pills she immediately has emesis following ingestion. ROS otherwise positive for constipation, last bowel movement on Thursday. Denies any fevers, chills, headache, chest pain, shortness of breath, dysuria or increased frequency.     In the ED, T 98.2, HR 73, /72, O2 97% on RA. Labs showed no leukocytosis with WBC 7.9, hemoglobin 9.2 (at baseline), and platelets 132. Metabolic panel unremarkable with Cr 1.09. LFTs with , AST 83, ALT 74 with normal Tbili (and downtrending on repeat labs). Mag and lipase normal. VBG unremarkable with lactate 1.7. UA negative. Patient had a ED US that showed a gallbladder with sludge and cholelithiasis. CT abdomen/pelvis showed no evidence of SBO, but did show a portal vein thrombosis as well as a clot in the SMV. Patient was given 3L NS, 1g Tylenol, and 4mg Zofran IV. Started on a heparin gtt for new thrombosis after ED discussed with Dr. Augustin. (15 Apr 2018 23:08)    Since admission progressive rise in total bilirubin.  CT scan revealed extrahepatic bile duct dilation.  PV/SMV thrombosis and pancreatic head adenocarcinoma.      PAST MEDICAL & SURGICAL HISTORY:  Cataract  Complete rotator cuff tear or rupture of left shoulder, not specified as traumatic  Hyperlipemia  Asthma: no h/o intubation, last episode of hospitalization due to asthma 10 years ago . No h/o medications on a daily basis  Anal cancer: diagnosed in 2010, had chemotherapy and radiation in 2010.  Complete rotator cuff tear or rupture of right shoulder, not specified as traumatic  Hypothyroidism  Hypertension  H/O shoulder surgery: total replacement right shoulder - 2016  H/O total hysterectomy: 30 years ago      Allergies    Levaquin (Other)    Intolerances        MEDICATIONS  (STANDING):  dextrose 5% + sodium chloride 0.45%. 1000 milliLiter(s) (75 mL/Hr) IV Continuous <Continuous>  docusate sodium 100 milliGRAM(s) Oral three times a day  dronabinol 2.5 milliGRAM(s) Oral two times a day  enoxaparin Injectable 80 milliGRAM(s) SubCutaneous two times a day  famotidine  IVPB 20 milliGRAM(s) IV Intermittent once  mirtazapine 15 milliGRAM(s) Oral at bedtime  ondansetron Injectable 4 milliGRAM(s) IV Push three times a day  pantoprazole   Suspension 40 milliGRAM(s) Oral before breakfast  polyethylene glycol 3350 17 Gram(s) Oral daily    MEDICATIONS  (PRN):  acetaminophen   Tablet. 650 milliGRAM(s) Oral every 8 hours PRN Mild Pain (1 - 3)  ALBUTerol    90 MICROgram(s) HFA Inhaler 2 Puff(s) Inhalation every 6 hours PRN Shortness of Breath and/or Wheezing  morphine  - Injectable 2 milliGRAM(s) IV Push every 6 hours PRN Severe Pain (7 - 10)  morphine  - Injectable 1 milliGRAM(s) IV Push every 3 hours PRN Moderate Pain (4 - 6)  ondansetron Injectable 4 milliGRAM(s) IV Push daily PRN Nausea and/or Vomiting  oxyCODONE    IR 5 milliGRAM(s) Oral every 4 hours PRN Moderate Pain (4 - 6)      Review of Systems:  anorexia, aversion to food.  No pruritis, fever or chills.      Vital Signs Last 24 Hrs  T(C): 36.7 (21 Apr 2018 16:26), Max: 37 (20 Apr 2018 22:32)  T(F): 98.1 (21 Apr 2018 16:26), Max: 98.6 (20 Apr 2018 22:32)  HR: 80 (21 Apr 2018 16:26) (54 - 80)  BP: 117/79 (21 Apr 2018 16:26) (117/79 - 171/80)  BP(mean): --  RR: 18 (21 Apr 2018 16:26) (18 - 18)  SpO2: 95% (21 Apr 2018 16:26) (95% - 98%)    PHYSICAL EXAM:  Constitutional: NAD, well-developed  HEENT mild icteric sclera  Neck: No LAD, supple  Respiratory: CTA and P  Cardiovascular: S1 and S2, RRR, no M  Gastrointestinal: BS+, soft, NT/ND, neg HSM,  Extremities: No peripheral edema, neg clubing, cyanosis  Vascular: 2+ peripheral pulses  Neurological: A/O x 3, no focal deficits  Psychiatric: Normal mood, normal affect  Skin: No rashes    LABS:                        10.6   5.31  )-----------( 163      ( 21 Apr 2018 14:58 )             33.3     04-21    136  |  96  |  4<L>  ----------------------------<  153<H>  3.8   |  27  |  0.97    Ca    9.7      21 Apr 2018 12:02    TPro  6.8  /  Alb  3.8  /  TBili  2.6<H>  /  DBili  x   /  AST  134<H>  /  ALT  120<H>  /  AlkPhos  653<H>  04-21        LIVER FUNCTIONS - ( 21 Apr 2018 12:02 )  Alb: 3.8 g/dL / Pro: 6.8 g/dL / ALK PHOS: 653 U/L / ALT: 120 U/L / AST: 134 U/L / GGT: x             RADIOLOGY & ADDITIONAL TESTS:

## 2018-04-21 NOTE — PROGRESS NOTE ADULT - SUBJECTIVE AND OBJECTIVE BOX
Patient is a 82y old  Female who presents with a chief complaint of nausea and vomiting, inability to tolerate PO (16 Apr 2018 14:16)                                                               INTERVAL HPI/OVERNIGHT EVENTS:    REVIEW OF SYSTEMS:     CONSTITUTIONAL: No weakness, fevers or chills  EYES/ENT: No visual changes , no ear ache   NECK: No pain or stiffness  RESPIRATORY: No cough, wheezing,  No shortness of breath  CARDIOVASCULAR: No chest pain or palpitations  GASTROINTESTINAL: No abdominal pain  . No nausea, vomiting, or hematemesis; No diarrhea or constipation. No melena or hematochezia.  GENITOURINARY: No dysuria, frequency or hematuria  NEUROLOGICAL: No numbness or weakness  SKIN: No itching, burning, rashes, or lesions                                                                                                                                                                                                                                                                                 Medications:  MEDICATIONS  (STANDING):  dextrose 5% + sodium chloride 0.45%. 1000 milliLiter(s) (75 mL/Hr) IV Continuous <Continuous>  docusate sodium 100 milliGRAM(s) Oral three times a day  dronabinol 2.5 milliGRAM(s) Oral two times a day  enoxaparin Injectable 80 milliGRAM(s) SubCutaneous two times a day  famotidine  IVPB 20 milliGRAM(s) IV Intermittent once  mirtazapine 15 milliGRAM(s) Oral at bedtime  ondansetron Injectable 4 milliGRAM(s) IV Push three times a day  pantoprazole   Suspension 40 milliGRAM(s) Oral before breakfast  polyethylene glycol 3350 17 Gram(s) Oral daily    MEDICATIONS  (PRN):  acetaminophen   Tablet. 650 milliGRAM(s) Oral every 8 hours PRN Mild Pain (1 - 3)  ALBUTerol    90 MICROgram(s) HFA Inhaler 2 Puff(s) Inhalation every 6 hours PRN Shortness of Breath and/or Wheezing  morphine  - Injectable 2 milliGRAM(s) IV Push every 6 hours PRN Severe Pain (7 - 10)  morphine  - Injectable 1 milliGRAM(s) IV Push every 3 hours PRN Moderate Pain (4 - 6)  ondansetron Injectable 4 milliGRAM(s) IV Push daily PRN Nausea and/or Vomiting  oxyCODONE    IR 5 milliGRAM(s) Oral every 4 hours PRN Moderate Pain (4 - 6)       Allergies    Levaquin (Other)    Intolerances      Vital Signs Last 24 Hrs  T(C): 36.8 (21 Apr 2018 21:45), Max: 37 (20 Apr 2018 22:32)  T(F): 98.2 (21 Apr 2018 21:45), Max: 98.6 (20 Apr 2018 22:32)  HR: 67 (21 Apr 2018 21:45) (54 - 80)  BP: 131/77 (21 Apr 2018 21:45) (117/79 - 171/80)  BP(mean): --  RR: 18 (21 Apr 2018 21:45) (18 - 18)  SpO2: 96% (21 Apr 2018 21:45) (95% - 98%)  CAPILLARY BLOOD GLUCOSE          04-20 @ 07:01  -  04-21 @ 07:00  --------------------------------------------------------  IN: 1600 mL / OUT: 0 mL / NET: 1600 mL    04-21 @ 07:01  - 04-21 @ 22:21  --------------------------------------------------------  IN: 240 mL / OUT: 0 mL / NET: 240 mL      Physical Exam:    Daily     Daily   General:  Well appearing, NAD, not cachetic  HEENT:  Nonicteric, PERRLA  CV:  RRR, S1S2   Lungs:  CTA B/L, no wheezes, rales, rhonchi  Abdomen:  Soft, non-tender, no distended, positive BS  Extremities:  2+ pulses, no c/c, no edema  Skin:  Warm and dry, no rashes  :  No metzger  Neuro:  AAOx3, non-focal, grossly intact                                                                                                                                                                                                                                                                                                LABS:                               10.6   5.31  )-----------( 163      ( 21 Apr 2018 14:58 )             33.3                      04-21    136  |  96  |  4<L>  ----------------------------<  153<H>  3.8   |  27  |  0.97    Ca    9.7      21 Apr 2018 12:02    TPro  6.8  /  Alb  3.8  /  TBili  2.6<H>  /  DBili  x   /  AST  134<H>  /  ALT  120<H>  /  AlkPhos  653<H>  04-21                       RADIOLOGY & ADDITIONAL TESTS         I personally reviewed: [  ]EKG   [  ]CXR    [  ] CT      A/P:         Discussed with :     [facility] consultants' Notes   Time spent : Patient is a 82y old  Female who presents with a chief complaint of nausea and vomiting, inability to tolerate PO (16 Apr 2018 14:16)                                                               INTERVAL HPI/OVERNIGHT EVENTS:    REVIEW OF SYSTEMS:     CONSTITUTIONAL: No weakness, fevers or chills  RESPIRATORY: No cough, wheezing,  No shortness of breath  CARDIOVASCULAR: No chest pain or palpitations  GASTROINTESTINAL: No abdominal pain  . Nausea   GENITOURINARY: No dysuria, frequency or hematuria  NEUROLOGICAL: No numbness or weakness                                                                                                                                                                                                                                                                                  Medications:  MEDICATIONS  (STANDING):  dextrose 5% + sodium chloride 0.45%. 1000 milliLiter(s) (75 mL/Hr) IV Continuous <Continuous>  docusate sodium 100 milliGRAM(s) Oral three times a day  dronabinol 2.5 milliGRAM(s) Oral two times a day  enoxaparin Injectable 80 milliGRAM(s) SubCutaneous two times a day  famotidine  IVPB 20 milliGRAM(s) IV Intermittent once  mirtazapine 15 milliGRAM(s) Oral at bedtime  ondansetron Injectable 4 milliGRAM(s) IV Push three times a day  pantoprazole   Suspension 40 milliGRAM(s) Oral before breakfast  polyethylene glycol 3350 17 Gram(s) Oral daily    MEDICATIONS  (PRN):  acetaminophen   Tablet. 650 milliGRAM(s) Oral every 8 hours PRN Mild Pain (1 - 3)  ALBUTerol    90 MICROgram(s) HFA Inhaler 2 Puff(s) Inhalation every 6 hours PRN Shortness of Breath and/or Wheezing  morphine  - Injectable 2 milliGRAM(s) IV Push every 6 hours PRN Severe Pain (7 - 10)  morphine  - Injectable 1 milliGRAM(s) IV Push every 3 hours PRN Moderate Pain (4 - 6)  ondansetron Injectable 4 milliGRAM(s) IV Push daily PRN Nausea and/or Vomiting  oxyCODONE    IR 5 milliGRAM(s) Oral every 4 hours PRN Moderate Pain (4 - 6)       Allergies    Levaquin (Other)    Intolerances      Vital Signs Last 24 Hrs  T(C): 36.8 (21 Apr 2018 21:45), Max: 37 (20 Apr 2018 22:32)  T(F): 98.2 (21 Apr 2018 21:45), Max: 98.6 (20 Apr 2018 22:32)  HR: 67 (21 Apr 2018 21:45) (54 - 80)  BP: 131/77 (21 Apr 2018 21:45) (117/79 - 171/80)  BP(mean): --  RR: 18 (21 Apr 2018 21:45) (18 - 18)  SpO2: 96% (21 Apr 2018 21:45) (95% - 98%)  CAPILLARY BLOOD GLUCOSE          04-20 @ 07:01  -  04-21 @ 07:00  --------------------------------------------------------  IN: 1600 mL / OUT: 0 mL / NET: 1600 mL    04-21 @ 07:01  -  04-21 @ 22:21  --------------------------------------------------------  IN: 240 mL / OUT: 0 mL / NET: 240 mL      Physical Exam:    General:  NAD  HEENT:  , PERRLA  CV:  RRR, S1S2   Lungs:  CTA B/L, no wheezes, rales, rhonchi  Abdomen:  Soft, nmild tenderness in RUQ  Extremities:  2+ pulses, no c/c, no edema  Skin:  Warm and dry, no rashes  :  No metzger  Neuro:  AAOx3, non-focal, grossly intact                                                                                                                                                                                                                                                                                                LABS:                               10.6   5.31  )-----------( 163      ( 21 Apr 2018 14:58 )             33.3                      04-21    136  |  96  |  4<L>  ----------------------------<  153<H>  3.8   |  27  |  0.97    Ca    9.7      21 Apr 2018 12:02    TPro  6.8  /  Alb  3.8  /  TBili  2.6<H>  /  DBili  x   /  AST  134<H>  /  ALT  120<H>  /  AlkPhos  653<H>  04-21

## 2018-04-21 NOTE — PROGRESS NOTE ADULT - ASSESSMENT
83 y/o female w hx of anal cancer in remission s/p radiation and chemo , HTN,Asthma , recently evaluated for abd pain and found to have panreatic mass with CT done on 3/27 showing 3.4 cm pancreatic head mass with moderaltely distended bladder , no dilatation of bile duct ( at the time ), and ecasemenet of adjacement vasculature , underwent EUS with bx and path consistent with pancreatic Ca per family , shceduled to start chemo at [place] this week however presenting now with abd pain, N/V with no fever .. CT shows dilatation of biliary duct but nl Bili.. no fever or chills..   1- Abd pain /N/v : sec to cancer .. pt still c/o Nausea  .. cont pain meds . changed to zofran .consider compazine   consider  celiac plexus  block   2- pancreatic Ca : not likley to be a surgical candidate ..   TB continues to rise ..plan to hold on cemo for now..  d/w Dr. Zurita : will need stent placement .. called and D/W  .. f/u input   d/w Onco : holding off on chemo  3- DVT of < from: CT Abdomen and Pelvis w/ IV Cont (04.15.18 @ 21:49) >  Main portal vein thrombus. Eccentric thrombus in the distal superior   mesenteric vein.  cont lovenox : consider changing to eliquis vs xarelto upon dc since pt and family not interested in lovenox   will need to change to heparin priro to ERCP    4- anemia : monitor H/H   5- thrombocytopenia : monitor for now .. pt reports self limited nose bleed this am otherwise mild and self-limited 81 y/o female w hx of anal cancer in remission s/p radiation and chemo , HTN,Asthma , recently evaluated for abd pain and found to have panreatic mass with CT done on 3/27 showing 3.4 cm pancreatic head mass with moderaltely distended bladder , no dilatation of bile duct ( at the time ), and ecasemenet of adjacement vasculature , underwent EUS with bx and path consistent with pancreatic Ca per family , shceduled to start chemo at [place] this week however presenting now with abd pain, N/V with no fever .. CT shows dilatation of biliary duct but nl Bili.. no fever or chills..   1- Abd pain /N/v : sec to cancer .. pt still c/o Nausea  .. cont pain meds . changed to compazine   consider  celiac plexus  block   2- pancreatic Ca : not likley to be a surgical candidate ..   TB continues to rise ..plan to hold on cemo for now..  d/w Dr. Zurita : will need stent placement .. called and D/W  .. f/u input   d/w Onco : holding off on chemo  3- DVT of < from: CT Abdomen and Pelvis w/ IV Cont (04.15.18 @ 21:49) >  Main portal vein thrombus. Eccentric thrombus in the distal superior   mesenteric vein.  cont lovenox : consider changing to eliquis vs xarelto upon dc since pt and family not interested in lovenox   change to heparin priro to ERCP    4- anemia : monitor H/H   5- thrombocytopenia : monitor for now ..

## 2018-04-22 LAB
ALBUMIN SERPL ELPH-MCNC: 3.4 G/DL — SIGNIFICANT CHANGE UP (ref 3.3–5)
ALP SERPL-CCNC: 656 U/L — HIGH (ref 40–120)
ALT FLD-CCNC: 120 U/L — HIGH (ref 10–45)
ANION GAP SERPL CALC-SCNC: 10 MMOL/L — SIGNIFICANT CHANGE UP (ref 5–17)
APTT BLD: 48.5 SEC — HIGH (ref 27.5–37.4)
AST SERPL-CCNC: 123 U/L — HIGH (ref 10–40)
BILIRUB SERPL-MCNC: 3.1 MG/DL — HIGH (ref 0.2–1.2)
BUN SERPL-MCNC: 4 MG/DL — LOW (ref 7–23)
CALCIUM SERPL-MCNC: 9 MG/DL — SIGNIFICANT CHANGE UP (ref 8.4–10.5)
CHLORIDE SERPL-SCNC: 93 MMOL/L — LOW (ref 96–108)
CO2 SERPL-SCNC: 28 MMOL/L — SIGNIFICANT CHANGE UP (ref 22–31)
CREAT SERPL-MCNC: 0.85 MG/DL — SIGNIFICANT CHANGE UP (ref 0.5–1.3)
GLUCOSE SERPL-MCNC: 128 MG/DL — HIGH (ref 70–99)
HCT VFR BLD CALC: 31.1 % — LOW (ref 34.5–45)
HGB BLD-MCNC: 10.1 G/DL — LOW (ref 11.5–15.5)
INR BLD: 1.17 RATIO — HIGH (ref 0.88–1.16)
MCHC RBC-ENTMCNC: 31.1 PG — SIGNIFICANT CHANGE UP (ref 27–34)
MCHC RBC-ENTMCNC: 32.5 GM/DL — SIGNIFICANT CHANGE UP (ref 32–36)
MCV RBC AUTO: 95.7 FL — SIGNIFICANT CHANGE UP (ref 80–100)
PLATELET # BLD AUTO: 109 K/UL — LOW (ref 150–400)
POTASSIUM SERPL-MCNC: 3.6 MMOL/L — SIGNIFICANT CHANGE UP (ref 3.5–5.3)
POTASSIUM SERPL-SCNC: 3.6 MMOL/L — SIGNIFICANT CHANGE UP (ref 3.5–5.3)
PROT SERPL-MCNC: 6.6 G/DL — SIGNIFICANT CHANGE UP (ref 6–8.3)
PROTHROM AB SERPL-ACNC: 12.8 SEC — HIGH (ref 9.8–12.7)
RBC # BLD: 3.25 M/UL — LOW (ref 3.8–5.2)
RBC # FLD: 16.1 % — HIGH (ref 10.3–14.5)
SODIUM SERPL-SCNC: 131 MMOL/L — LOW (ref 135–145)
WBC # BLD: 4.48 K/UL — SIGNIFICANT CHANGE UP (ref 3.8–10.5)
WBC # FLD AUTO: 4.48 K/UL — SIGNIFICANT CHANGE UP (ref 3.8–10.5)

## 2018-04-22 RX ORDER — HEPARIN SODIUM 5000 [USP'U]/ML
INJECTION INTRAVENOUS; SUBCUTANEOUS
Qty: 25000 | Refills: 0 | Status: DISCONTINUED | OUTPATIENT
Start: 2018-04-22 | End: 2018-04-23

## 2018-04-22 RX ORDER — HEPARIN SODIUM 5000 [USP'U]/ML
6500 INJECTION INTRAVENOUS; SUBCUTANEOUS EVERY 6 HOURS
Qty: 0 | Refills: 0 | Status: DISCONTINUED | OUTPATIENT
Start: 2018-04-22 | End: 2018-04-23

## 2018-04-22 RX ORDER — ENOXAPARIN SODIUM 100 MG/ML
80 INJECTION SUBCUTANEOUS ONCE
Qty: 0 | Refills: 0 | Status: COMPLETED | OUTPATIENT
Start: 2018-04-22 | End: 2018-04-22

## 2018-04-22 RX ORDER — HEPARIN SODIUM 5000 [USP'U]/ML
3000 INJECTION INTRAVENOUS; SUBCUTANEOUS EVERY 6 HOURS
Qty: 0 | Refills: 0 | Status: DISCONTINUED | OUTPATIENT
Start: 2018-04-22 | End: 2018-04-23

## 2018-04-22 RX ADMIN — Medication 2.5 MILLIGRAM(S): at 17:38

## 2018-04-22 RX ADMIN — Medication 5 MILLIGRAM(S): at 08:22

## 2018-04-22 RX ADMIN — MORPHINE SULFATE 2 MILLIGRAM(S): 50 CAPSULE, EXTENDED RELEASE ORAL at 00:59

## 2018-04-22 RX ADMIN — HEPARIN SODIUM 1400 UNIT(S)/HR: 5000 INJECTION INTRAVENOUS; SUBCUTANEOUS at 22:54

## 2018-04-22 RX ADMIN — MORPHINE SULFATE 1 MILLIGRAM(S): 50 CAPSULE, EXTENDED RELEASE ORAL at 00:12

## 2018-04-22 RX ADMIN — ENOXAPARIN SODIUM 80 MILLIGRAM(S): 100 INJECTION SUBCUTANEOUS at 11:09

## 2018-04-22 RX ADMIN — Medication 10 MILLIGRAM(S): at 11:09

## 2018-04-22 RX ADMIN — MORPHINE SULFATE 2 MILLIGRAM(S): 50 CAPSULE, EXTENDED RELEASE ORAL at 00:29

## 2018-04-22 RX ADMIN — Medication 5 MILLIGRAM(S): at 17:38

## 2018-04-22 NOTE — PROGRESS NOTE ADULT - ASSESSMENT
81 y/o female w hx of anal cancer in remission s/p radiation and chemo , HTN,Asthma , recently evaluated for abd pain and found to have panreatic mass with CT done on 3/27 showing 3.4 cm pancreatic head mass with moderaltely distended bladder , no dilatation of bile duct ( at the time ), and ecasemenet of adjacement vasculature , underwent EUS with bx and path consistent with pancreatic Ca per family , shceduled to start chemo at [place] this week however presenting now with abd pain, N/V with no fever .. CT shows dilatation of biliary duct but nl Bili.. no fever or chills..   1- Abd pain /N/v : sec to cancer .. pt still c/o Nausea  .. cont pain meds . cont compazine   consider  celiac plexus  block   2- pancreatic Ca : not likley to be a surgical candidate ..   TB continues to rise ..plan to hold on cemo for now..  will need stent placement .. plan for ERCP   ..  d/w Onco : holding off on chemo  3- DVT of < from: CT Abdomen and Pelvis w/ IV Cont (04.15.18 @ 21:49) >  Main portal vein thrombus. Eccentric thrombus in the distal superior   mesenteric vein.  cont lovenox : consider changing to eliquis vs xarelto upon dc since pt and family not interested in lovenox   change to heparin priro to ERCP    4- anemia : monitor H/H   5- thrombocytopenia : monitor for now ..

## 2018-04-22 NOTE — PROGRESS NOTE ADULT - SUBJECTIVE AND OBJECTIVE BOX
Patient is a 82y old  Female who presents with a chief complaint of nausea and vomiting, inability to tolerate PO (16 Apr 2018 14:16)                                                               INTERVAL HPI/OVERNIGHT EVENTS:    REVIEW OF SYSTEMS:     CONSTITUTIONAL: No weakness, fevers or chills  RESPIRATORY: No cough, wheezing,  No shortness of breath  CARDIOVASCULAR: No chest pain or palpitations  GASTROINTESTINAL: No abdominal pain  . nausea, vomiting  GENITOURINARY: No dysuria, frequency or hematuria  NEUROLOGICAL: No numbness or weakness                                                                                                                                                                                                                                                                               Medications:  MEDICATIONS  (STANDING):  dextrose 5% + sodium chloride 0.45%. 1000 milliLiter(s) (75 mL/Hr) IV Continuous <Continuous>  docusate sodium 100 milliGRAM(s) Oral three times a day  dronabinol 2.5 milliGRAM(s) Oral two times a day  famotidine  IVPB 20 milliGRAM(s) IV Intermittent once  heparin  Infusion.  Unit(s)/Hr (14 mL/Hr) IV Continuous <Continuous>  mirtazapine 15 milliGRAM(s) Oral at bedtime  ondansetron Injectable 4 milliGRAM(s) IV Push three times a day  pantoprazole   Suspension 40 milliGRAM(s) Oral before breakfast  polyethylene glycol 3350 17 Gram(s) Oral daily    MEDICATIONS  (PRN):  acetaminophen   Tablet. 650 milliGRAM(s) Oral every 8 hours PRN Mild Pain (1 - 3)  ALBUTerol    90 MICROgram(s) HFA Inhaler 2 Puff(s) Inhalation every 6 hours PRN Shortness of Breath and/or Wheezing  heparin  Injectable 6500 Unit(s) IV Push every 6 hours PRN For aPTT less than 40  heparin  Injectable 3000 Unit(s) IV Push every 6 hours PRN For aPTT between 40 - 57  morphine  - Injectable 2 milliGRAM(s) IV Push every 6 hours PRN Severe Pain (7 - 10)  morphine  - Injectable 1 milliGRAM(s) IV Push every 3 hours PRN Moderate Pain (4 - 6)  oxyCODONE    IR 5 milliGRAM(s) Oral every 4 hours PRN Moderate Pain (4 - 6)  prochlorperazine   Injectable 5 milliGRAM(s) IntraMuscular every 6 hours PRN nausea       Allergies    Levaquin (Other)    Intolerances      Vital Signs Last 24 Hrs  T(C): 36.9 (22 Apr 2018 21:51), Max: 36.9 (22 Apr 2018 21:51)  T(F): 98.4 (22 Apr 2018 21:51), Max: 98.4 (22 Apr 2018 21:51)  HR: 66 (22 Apr 2018 21:51) (66 - 70)  BP: 149/76 (22 Apr 2018 21:51) (112/64 - 149/76)  BP(mean): --  RR: 18 (22 Apr 2018 21:51) (18 - 18)  SpO2: 98% (22 Apr 2018 21:51) (96% - 98%)  CAPILLARY BLOOD GLUCOSE          04-21 @ 07:01  -  04-22 @ 07:00  --------------------------------------------------------  IN: 1380 mL / OUT: 0 mL / NET: 1380 mL    04-22 @ 07:01  - 04-22 @ 23:26  --------------------------------------------------------  IN: 680 mL / OUT: 0 mL / NET: 680 mL      Physical Exam:    General:  Well appearing, NAD  HEENTicteric, PERRLA  CV:  RRR, S1S2   Lungs:  CTA B/L,   Abdomen:  Soft,mild tenderness   Extremities:  2+ pulses, no c/c, no edema  Skin:  Warm and dry, no rashes  :  No metzger  Neuro:  AAOx3, non-focal, grossly intact                                                                                                                                                                                                                                                                                                LABS:                               10.1   4.48  )-----------( 109      ( 22 Apr 2018 09:48 )             31.1                      04-22    131<L>  |  93<L>  |  4<L>  ----------------------------<  128<H>  3.6   |  28  |  0.85    Ca    9.0      22 Apr 2018 07:26    TPro  6.6  /  Alb  3.4  /  TBili  3.1<H>  /  DBili  x   /  AST  123<H>  /  ALT  120<H>  /  AlkPhos  656<H>  04-22

## 2018-04-22 NOTE — PROGRESS NOTE ADULT - SUBJECTIVE AND OBJECTIVE BOX
Chief Complaint:  fu  History of Present Illness:  still with some abdominal pain, but usually only at night, some nausea but minimal vomiting and patient not eating very much. No BM for one week. No HA, F/C, dizziness, lightheadedness, parastehsias, dysuria    MEDICATIONS  (STANDING):  bisacodyl Suppository 10 milliGRAM(s) Rectal once  dextrose 5% + sodium chloride 0.45%. 1000 milliLiter(s) (75 mL/Hr) IV Continuous <Continuous>  docusate sodium 100 milliGRAM(s) Oral three times a day  dronabinol 2.5 milliGRAM(s) Oral two times a day  enoxaparin Injectable 80 milliGRAM(s) SubCutaneous once  famotidine  IVPB 20 milliGRAM(s) IV Intermittent once  mirtazapine 15 milliGRAM(s) Oral at bedtime  ondansetron Injectable 4 milliGRAM(s) IV Push three times a day  pantoprazole   Suspension 40 milliGRAM(s) Oral before breakfast  polyethylene glycol 3350 17 Gram(s) Oral daily    MEDICATIONS  (PRN):  acetaminophen   Tablet. 650 milliGRAM(s) Oral every 8 hours PRN Mild Pain (1 - 3)  ALBUTerol    90 MICROgram(s) HFA Inhaler 2 Puff(s) Inhalation every 6 hours PRN Shortness of Breath and/or Wheezing  morphine  - Injectable 2 milliGRAM(s) IV Push every 6 hours PRN Severe Pain (7 - 10)  morphine  - Injectable 1 milliGRAM(s) IV Push every 3 hours PRN Moderate Pain (4 - 6)  oxyCODONE    IR 5 milliGRAM(s) Oral every 4 hours PRN Moderate Pain (4 - 6)  prochlorperazine   Injectable 5 milliGRAM(s) IntraMuscular every 6 hours PRN nausea      Allergies    Levaquin (Other)    Intolerances        Vital Signs Last 24 Hrs  T(C): 36.8 (21 Apr 2018 21:45), Max: 36.8 (21 Apr 2018 21:45)  T(F): 98.2 (21 Apr 2018 21:45), Max: 98.2 (21 Apr 2018 21:45)  HR: 67 (21 Apr 2018 21:45) (67 - 80)  BP: 131/77 (21 Apr 2018 21:45) (117/79 - 131/77)  BP(mean): --  RR: 18 (21 Apr 2018 21:45) (18 - 18)  SpO2: 96% (21 Apr 2018 21:45) (95% - 96%)    PHYSICAL EXAM  General: adult in NAD  HEENT: clear oropharynx, anicteric sclera, pink conjunctiva  Neck: supple  CV: normal S1/S2, +murmur  Lungs: decreased BS, but poor effort  Abdomen: soft non-tender non-distended, positive bowel sounds  Ext: no clubbing cyanosis or edema    LABS:                          10.1   4.48  )-----------( 109      ( 22 Apr 2018 09:48 )             31.1         Mean Cell Volume : 95.7 fl  Mean Cell Hemoglobin : 31.1 pg  Mean Cell Hemoglobin Concentration : 32.5 gm/dL  Auto Neutrophil # : x  Auto Lymphocyte # : x  Auto Monocyte # : x  Auto Eosinophil # : x  Auto Basophil # : x  Auto Neutrophil % : x  Auto Lymphocyte % : x  Auto Monocyte % : x  Auto Eosinophil % : x  Auto Basophil % : x      Serial CBC's  04-22 @ 09:48  Hct-31.1 / Hgb-10.1 / Plat-109 / RBC-3.25 / WBC-4.48  Serial CBC's  04-21 @ 14:58  Hct-33.3 / Hgb-10.6 / Plat-163 / RBC-3.41 / WBC-5.31  Serial CBC's  04-20 @ 09:37  Hct-28.0 / Hgb-9.1 / Plat-129 / RBC-2.94 / WBC-4.61  Serial CBC's  04-19 @ 07:30  Hct-27.2 / Hgb-9.2 / Plat-120 / RBC-2.95 / WBC-5.34      04-22    131<L>  |  93<L>  |  4<L>  ----------------------------<  128<H>  3.6   |  28  |  0.85    Ca    9.0      22 Apr 2018 07:26    TPro  6.6  /  Alb  3.4  /  TBili  3.1<H>  /  DBili  x   /  AST  123<H>  /  ALT  120<H>  /  AlkPhos  656<H>  04-22          Folate, Serum: >20.0 ng/mL (04-18 @ 10:59)  Iron - Total Binding Capacity.: 244 ug/dL (04-18 @ 10:59)  Ferritin, Serum: 163 ng/mL (04-18 @ 10:59)

## 2018-04-22 NOTE — PROGRESS NOTE ADULT - ASSESSMENT
The pt is an 82F with hx HTN, asthma, anal cancer s/p radiation/chemo with recent diagnosis of pancreatic cancer, was scheduled to start chemo at [place] 4/18, now presents with nausea/vomiting/abdominal pain.    #Pancreatic cancer- recent diagnosis, was to start chemotherapy gemzar/abraxane at [place] [facility] on 4/18; with increased abdominal pain/n/v  - CT with intrahepatic and extrahepatic biliary dilation- transaminases sl high and increasing slowly, bilirubin rising slowly, 3.5 today- monitor daily  - on morphine prn for pain- added oral option with oxycodone   - pathology obtained from previous biopsy, placed in chart; Dr. Salcido discussed with previous Oncologist  - GI following- celiac plexus block a possibility depending on course- currently pain controlled with medication  - as Bilirubin is rising, she will need stenting - planned for ERCP in AM; will plan to give first dose chemo this admission, hopefully by the end of the week  - cont antiemetics - compazine added  - on low dose marinol for nausea / appetite stimulant, but now pt refusing because says it's not helping. Can consider increase to 5mg BID    #Anemia, normocytic- likely AOCD; no evidence of bleeding; was on oral iron previously  - iron studies/ b12/folate normal  - monitor Hg- slightly improved    # portal vein thrombosis and eccentric thrombosis of mesenteric vein   - currently on lovenox- pt stating that she wants alternative for discharge; NOAC not yet approved but ongoing studies- she preferred this option; on discharge, will plan Xarelto 15mg bid x 21 days then 20mg daily.    #constipation  - likely opiate induced - plus not eating. Cont colace, senna and miralax, although pt not taking regularly anymore. Will give one dose of dulcalox supspository    d/w RN

## 2018-04-23 LAB
ALBUMIN SERPL ELPH-MCNC: 3.3 G/DL — SIGNIFICANT CHANGE UP (ref 3.3–5)
ALP SERPL-CCNC: 623 U/L — HIGH (ref 40–120)
ALT FLD-CCNC: 109 U/L — HIGH (ref 10–45)
ANION GAP SERPL CALC-SCNC: 10 MMOL/L — SIGNIFICANT CHANGE UP (ref 5–17)
APTT BLD: 47.6 SEC — HIGH (ref 27.5–37.4)
APTT BLD: > 200 SEC (ref 27.5–37.4)
AST SERPL-CCNC: 99 U/L — HIGH (ref 10–40)
BILIRUB SERPL-MCNC: 3.3 MG/DL — HIGH (ref 0.2–1.2)
BUN SERPL-MCNC: 5 MG/DL — LOW (ref 7–23)
CALCIUM SERPL-MCNC: 8.9 MG/DL — SIGNIFICANT CHANGE UP (ref 8.4–10.5)
CHLORIDE SERPL-SCNC: 96 MMOL/L — SIGNIFICANT CHANGE UP (ref 96–108)
CO2 SERPL-SCNC: 27 MMOL/L — SIGNIFICANT CHANGE UP (ref 22–31)
CREAT SERPL-MCNC: 0.86 MG/DL — SIGNIFICANT CHANGE UP (ref 0.5–1.3)
GLUCOSE SERPL-MCNC: 147 MG/DL — HIGH (ref 70–99)
HCT VFR BLD CALC: 32 % — LOW (ref 34.5–45)
HGB BLD-MCNC: 10.6 G/DL — LOW (ref 11.5–15.5)
MCHC RBC-ENTMCNC: 32 PG — SIGNIFICANT CHANGE UP (ref 27–34)
MCHC RBC-ENTMCNC: 33.1 GM/DL — SIGNIFICANT CHANGE UP (ref 32–36)
MCV RBC AUTO: 96.8 FL — SIGNIFICANT CHANGE UP (ref 80–100)
PLATELET # BLD AUTO: 93 K/UL — LOW (ref 150–400)
POTASSIUM SERPL-MCNC: 3.5 MMOL/L — SIGNIFICANT CHANGE UP (ref 3.5–5.3)
POTASSIUM SERPL-SCNC: 3.5 MMOL/L — SIGNIFICANT CHANGE UP (ref 3.5–5.3)
PROT SERPL-MCNC: 6 G/DL — SIGNIFICANT CHANGE UP (ref 6–8.3)
RBC # BLD: 3.31 M/UL — LOW (ref 3.8–5.2)
RBC # FLD: 14.8 % — HIGH (ref 10.3–14.5)
SODIUM SERPL-SCNC: 133 MMOL/L — LOW (ref 135–145)
WBC # BLD: 4.6 K/UL — SIGNIFICANT CHANGE UP (ref 3.8–10.5)
WBC # FLD AUTO: 4.6 K/UL — SIGNIFICANT CHANGE UP (ref 3.8–10.5)

## 2018-04-23 RX ORDER — HEPARIN SODIUM 5000 [USP'U]/ML
1100 INJECTION INTRAVENOUS; SUBCUTANEOUS
Qty: 25000 | Refills: 0 | Status: DISCONTINUED | OUTPATIENT
Start: 2018-04-23 | End: 2018-04-24

## 2018-04-23 RX ORDER — MORPHINE SULFATE 50 MG/1
1 CAPSULE, EXTENDED RELEASE ORAL
Qty: 0 | Refills: 0 | Status: DISCONTINUED | OUTPATIENT
Start: 2018-04-23 | End: 2018-04-24

## 2018-04-23 RX ORDER — HEPARIN SODIUM 5000 [USP'U]/ML
3000 INJECTION INTRAVENOUS; SUBCUTANEOUS EVERY 6 HOURS
Qty: 0 | Refills: 0 | Status: DISCONTINUED | OUTPATIENT
Start: 2018-04-23 | End: 2018-04-24

## 2018-04-23 RX ORDER — HEPARIN SODIUM 5000 [USP'U]/ML
6500 INJECTION INTRAVENOUS; SUBCUTANEOUS EVERY 6 HOURS
Qty: 0 | Refills: 0 | Status: DISCONTINUED | OUTPATIENT
Start: 2018-04-23 | End: 2018-04-24

## 2018-04-23 RX ORDER — MORPHINE SULFATE 50 MG/1
2 CAPSULE, EXTENDED RELEASE ORAL EVERY 6 HOURS
Qty: 0 | Refills: 0 | Status: DISCONTINUED | OUTPATIENT
Start: 2018-04-23 | End: 2018-04-24

## 2018-04-23 RX ADMIN — HEPARIN SODIUM 0 UNIT(S)/HR: 5000 INJECTION INTRAVENOUS; SUBCUTANEOUS at 06:03

## 2018-04-23 RX ADMIN — HEPARIN SODIUM 1100 UNIT(S)/HR: 5000 INJECTION INTRAVENOUS; SUBCUTANEOUS at 07:08

## 2018-04-23 RX ADMIN — SODIUM CHLORIDE 75 MILLILITER(S): 9 INJECTION, SOLUTION INTRAVENOUS at 22:29

## 2018-04-23 RX ADMIN — MORPHINE SULFATE 1 MILLIGRAM(S): 50 CAPSULE, EXTENDED RELEASE ORAL at 02:19

## 2018-04-23 RX ADMIN — ONDANSETRON 4 MILLIGRAM(S): 8 TABLET, FILM COATED ORAL at 05:07

## 2018-04-23 RX ADMIN — Medication 100 MILLIGRAM(S): at 22:29

## 2018-04-23 RX ADMIN — ONDANSETRON 4 MILLIGRAM(S): 8 TABLET, FILM COATED ORAL at 11:49

## 2018-04-23 RX ADMIN — MIRTAZAPINE 15 MILLIGRAM(S): 45 TABLET, ORALLY DISINTEGRATING ORAL at 22:29

## 2018-04-23 RX ADMIN — MORPHINE SULFATE 1 MILLIGRAM(S): 50 CAPSULE, EXTENDED RELEASE ORAL at 01:19

## 2018-04-23 RX ADMIN — SODIUM CHLORIDE 75 MILLILITER(S): 9 INJECTION, SOLUTION INTRAVENOUS at 07:10

## 2018-04-23 NOTE — PROGRESS NOTE ADULT - SUBJECTIVE AND OBJECTIVE BOX
Patient is a 82y old  Female who presents with a chief complaint of nausea and vomiting, inability to tolerate PO (2018 14:16)                                                               INTERVAL HPI/OVERNIGHT EVENTS:    REVIEW OF SYSTEMS:     CONSTITUTIONAL: No weakness, fevers or chills  EYES/ENT: No visual changes , no ear ache   NECK: No pain or stiffness  RESPIRATORY: No cough, wheezing,  No shortness of breath  CARDIOVASCULAR: No chest pain or palpitations  GASTROINTESTINAL: No abdominal pain  . No nausea, vomiting, or hematemesis; No diarrhea or constipation. No melena or hematochezia.  GENITOURINARY: No dysuria, frequency or hematuria  NEUROLOGICAL: No numbness or weakness  SKIN: No itching, burning, rashes, or lesions                                                                                                                                                                                                                                                                                 Medications:  MEDICATIONS  (STANDING):  bisacodyl Suppository 10 milliGRAM(s) Rectal once  dextrose 5% + sodium chloride 0.45%. 1000 milliLiter(s) (75 mL/Hr) IV Continuous <Continuous>  docusate sodium 100 milliGRAM(s) Oral three times a day  dronabinol 2.5 milliGRAM(s) Oral two times a day  famotidine  IVPB 20 milliGRAM(s) IV Intermittent once  heparin  Infusion. 1100 Unit(s)/Hr (11 mL/Hr) IV Continuous <Continuous>  mirtazapine 15 milliGRAM(s) Oral at bedtime  ondansetron Injectable 4 milliGRAM(s) IV Push three times a day  pantoprazole   Suspension 40 milliGRAM(s) Oral before breakfast  polyethylene glycol 3350 17 Gram(s) Oral daily    MEDICATIONS  (PRN):  acetaminophen   Tablet. 650 milliGRAM(s) Oral every 8 hours PRN Mild Pain (1 - 3)  ALBUTerol    90 MICROgram(s) HFA Inhaler 2 Puff(s) Inhalation every 6 hours PRN Shortness of Breath and/or Wheezing  heparin  Injectable 6500 Unit(s) IV Push every 6 hours PRN For aPTT less than 40  heparin  Injectable 3000 Unit(s) IV Push every 6 hours PRN For aPTT between 40 - 57  morphine  - Injectable 2 milliGRAM(s) IV Push every 6 hours PRN Severe Pain (7 - 10)  morphine  - Injectable 1 milliGRAM(s) IV Push every 3 hours PRN Moderate Pain (4 - 6)  oxyCODONE    IR 5 milliGRAM(s) Oral every 4 hours PRN Moderate Pain (4 - 6)  prochlorperazine   Injectable 5 milliGRAM(s) IntraMuscular every 6 hours PRN nausea       Allergies    Levaquin (Other)    Intolerances      Vital Signs Last 24 Hrs  T(C): 37 (2018 20:48), Max: 37 (2018 20:48)  T(F): 98.6 (2018 20:48), Max: 98.6 (2018 20:48)  HR: 81 (2018 20:48) (74 - 81)  BP: 125/75 (2018 20:48) (113/75 - 125/75)  BP(mean): --  RR: 18 (2018 20:48) (18 - 18)  SpO2: 96% (2018 20:48) (96% - 96%)  CAPILLARY BLOOD GLUCOSE          - @ 07:01  -   @ 07:00  --------------------------------------------------------  IN: 680 mL / OUT: 0 mL / NET: 680 mL     @ 07:01  -  24 @ 00:28  --------------------------------------------------------  IN: 1812 mL / OUT: 0 mL / NET: 1812 mL      Physical Exam:    Daily     Daily Weight in k.6 (2018 06:06)  General:  Well appearing, NAD, not cachetic  HEENT:  Nonicteric, PERRLA  CV:  RRR, S1S2   Lungs:  CTA B/L, no wheezes, rales, rhonchi  Abdomen:  Soft, non-tender, no distended, positive BS  Extremities:  2+ pulses, no c/c, no edema  Skin:  Warm and dry, no rashes  :  No metzger  Neuro:  AAOx3, non-focal, grossly intact                                                                                                                                                                                                                                                                                                LABS:                               10.6   4.6   )-----------( 93       ( 2018 04:47 )             32.0                      04-23    133<L>  |  96  |  5<L>  ----------------------------<  147<H>  3.5   |  27  |  0.86    Ca    8.9      2018 04:47    TPro  6.0  /  Alb  3.3  /  TBili  3.3<H>  /  DBili  x   /  AST  99<H>  /  ALT  109<H>  /  AlkPhos  623<H>                         RADIOLOGY & ADDITIONAL TESTS         I personally reviewed: [  ]EKG   [  ]CXR    [  ] CT      A/P:         Discussed with :     Amanda consultants' Notes   Time spent : Patient is a 82y old  Female who presents with a chief complaint of nausea and vomiting, inability to tolerate PO (2018 14:16)                                                               INTERVAL HPI/OVERNIGHT EVENTS:    REVIEW OF SYSTEMS:     CONSTITUTIONAL: No weakness, fevers or chills  RESPIRATORY: No cough, wheezing,  No shortness of breath  CARDIOVASCULAR: No chest pain or palpitations  GASTROINTESTINAL: mild  abdominal pain  .  nausea, no  vomiting,  GENITOURINARY: No dysuria, frequency or hematuria  NEUROLOGICAL: No numbness or weakness                                                                                                                                                                                                                                                                                Medications:  MEDICATIONS  (STANDING):  bisacodyl Suppository 10 milliGRAM(s) Rectal once  dextrose 5% + sodium chloride 0.45%. 1000 milliLiter(s) (75 mL/Hr) IV Continuous <Continuous>  docusate sodium 100 milliGRAM(s) Oral three times a day  dronabinol 2.5 milliGRAM(s) Oral two times a day  famotidine  IVPB 20 milliGRAM(s) IV Intermittent once  heparin  Infusion. 1100 Unit(s)/Hr (11 mL/Hr) IV Continuous <Continuous>  mirtazapine 15 milliGRAM(s) Oral at bedtime  ondansetron Injectable 4 milliGRAM(s) IV Push three times a day  pantoprazole   Suspension 40 milliGRAM(s) Oral before breakfast  polyethylene glycol 3350 17 Gram(s) Oral daily    MEDICATIONS  (PRN):  acetaminophen   Tablet. 650 milliGRAM(s) Oral every 8 hours PRN Mild Pain (1 - 3)  ALBUTerol    90 MICROgram(s) HFA Inhaler 2 Puff(s) Inhalation every 6 hours PRN Shortness of Breath and/or Wheezing  heparin  Injectable 6500 Unit(s) IV Push every 6 hours PRN For aPTT less than 40  heparin  Injectable 3000 Unit(s) IV Push every 6 hours PRN For aPTT between 40 - 57  morphine  - Injectable 2 milliGRAM(s) IV Push every 6 hours PRN Severe Pain (7 - 10)  morphine  - Injectable 1 milliGRAM(s) IV Push every 3 hours PRN Moderate Pain (4 - 6)  oxyCODONE    IR 5 milliGRAM(s) Oral every 4 hours PRN Moderate Pain (4 - 6)  prochlorperazine   Injectable 5 milliGRAM(s) IntraMuscular every 6 hours PRN nausea       Allergies    Levaquin (Other)    Intolerances      Vital Signs Last 24 Hrs  T(C): 37 (2018 20:48), Max: 37 (2018 20:48)  T(F): 98.6 (2018 20:48), Max: 98.6 (2018 20:48)  HR: 81 (2018 20:48) (74 - 81)  BP: 125/75 (2018 20:48) (113/75 - 125/75)  BP(mean): --  RR: 18 (2018 20:48) (18 - 18)  SpO2: 96% (2018 20:48) (96% - 96%)  CAPILLARY BLOOD GLUCOSE           @ 07:01  -   @ 07:00  --------------------------------------------------------  IN: 680 mL / OUT: 0 mL / NET: 680 mL     @ 07:01  -  24 @ 00:28  --------------------------------------------------------  IN: 1812 mL / OUT: 0 mL / NET: 1812 mL      Physical Exam:    Daily     Daily Weight in k.6 (2018 06:06)  General:  NAD but looks weak   HEENT:  icteric, PERRLA  CV:  RRR, S1S2   Lungs:  CTA B/L, no wheezes, rales, rhonchi  Abdomen:  Soft, non-tender, no distended, positive BS  Extremities:  2+ pulses, no c/c, no edema  Skin:  Warm and dry, no rashes  :  No metzger  Neuro:  AAOx3, non-focal, grossly intact                                                                                                                                                                                                                                                                                                LABS:                               10.6   4.6   )-----------( 93       ( 2018 04:47 )             32.0                          133<L>  |  96  |  5<L>  ----------------------------<  147<H>  3.5   |  27  |  0.86    Ca    8.9      2018 04:47    TPro  6.0  /  Alb  3.3  /  TBili  3.3<H>  /  DBili  x   /  AST  99<H>  /  ALT  109<H>  /  AlkPhos  623<H>

## 2018-04-23 NOTE — CHART NOTE - NSCHARTNOTEFT_GEN_A_CORE
Malnutrition Follow Up     Pt c pancreatic cancer, chemo held at this time due to rising bilirubin, interim events noted. Noted plan for ERCP later today.     Source: Patient [x]    Family [x]- daughter at bedside     Diet : NPO for procedure at this time      Patient reports [x] other: her nausea is now controlled c anti-nausea medications. States no BM overt the weekend and was not able to tolerate suppository. Pt reports she did not eat much over the weekend either, not much of an appetite.      Current Weight: Weight (kg): 4/18: 170.1->4/23: 166.6 pounds- would continue to monitor     Pertinent Medications: MEDICATIONS  (STANDING):  bisacodyl Suppository 10 milliGRAM(s) Rectal once  dextrose 5% + sodium chloride 0.45%. 1000 milliLiter(s) (75 mL/Hr) IV Continuous <Continuous>  docusate sodium 100 milliGRAM(s) Oral three times a day  dronabinol 2.5 milliGRAM(s) Oral two times a day  famotidine  IVPB 20 milliGRAM(s) IV Intermittent once  mirtazapine 15 milliGRAM(s) Oral at bedtime  ondansetron Injectable 4 milliGRAM(s) IV Push three times a day  pantoprazole   Suspension 40 milliGRAM(s) Oral before breakfast  polyethylene glycol 3350 17 Gram(s) Oral daily    MEDICATIONS  (PRN):  acetaminophen   Tablet. 650 milliGRAM(s) Oral every 8 hours PRN Mild Pain (1 - 3)  ALBUTerol    90 MICROgram(s) HFA Inhaler 2 Puff(s) Inhalation every 6 hours PRN Shortness of Breath and/or Wheezing  morphine  - Injectable 2 milliGRAM(s) IV Push every 6 hours PRN Severe Pain (7 - 10)  morphine  - Injectable 1 milliGRAM(s) IV Push every 3 hours PRN Moderate Pain (4 - 6)  oxyCODONE    IR 5 milliGRAM(s) Oral every 4 hours PRN Moderate Pain (4 - 6)  prochlorperazine   Injectable 5 milliGRAM(s) IntraMuscular every 6 hours PRN nausea    Pertinent Labs:  04-23 Na133 mmol/L<L> Glu 147 mg/dL<H> K+ 3.5 mmol/L Cr  0.86 mg/dL BUN 5 mg/dL<L> 04-23 Alb 3.3 g/dL      Skin: intact, no edema noted at this time     Estimated Needs:   [x] no change since previous assessment        Previous Nutrition Diagnosis:      [x] Malnutrition     Nutrition Diagnosis is [x] ongoing- to be addressed c improvement in PO acceptance and supplements            New Nutrition Diagnosis: [x] not applicable        Recommend    [x] Change Diet To: Resume PO diet as medically feasible.     [x] Nutrition Supplement: Continue c Ensure Clear once PO diet resumed and medically feasible.     [x] Discussed c Pt importance of adequate intake and gradually increasing PO intake.        Monitoring and Evaluation:     [x] PO intake [x] Tolerance to diet prescription [x] weights [x] follow up per protocol    RD remains available.   Chasity Jones, MS, RD, , CNSC #[phone]

## 2018-04-23 NOTE — PROGRESS NOTE ADULT - ASSESSMENT
The pt is an 82F with hx HTN, asthma, anal cancer s/p radiation/chemo with recent diagnosis of pancreatic cancer, was scheduled to start chemo at [place] 4/18, now presents with nausea/vomiting/abdominal pain.    #Pancreatic cancer- recent diagnosis, was to start chemotherapy gemzar/abraxane at [place] on 4/18; with increased abdominal pain/n/v  - CT with intrahepatic and extrahepatic biliary dilation  - on morphine prn for pain- added oral option with oxycodone   - pathology obtained from previous biopsy, placed in chart; Dr. 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  - lovenox was changed to heparin gtt prior to procedure    #constipation  - likely opiate induced - cont aggressive bowel regimen    D/W NP

## 2018-04-23 NOTE — PROGRESS NOTE ADULT - SUBJECTIVE AND OBJECTIVE BOX
Pre-Endoscopy Evaluation      Referring Physician: Dr. ADILIA Augustin                                  Procedure: ERCP    Indication for Procedure: Obstructive jaundice    Pertinent History: 82 year old  female with Pancreatic head mass with obstructive jaundice      PAST MEDICAL & SURGICAL HISTORY:  Cataract  Complete rotator cuff tear or rupture of left shoulder, not specified as traumatic  Hyperlipemia  Asthma: no h/o intubation, last episode of hospitalization due to asthma 10 years ago . No h/o medications on a daily basis  Anal cancer: diagnosed in , had chemotherapy and radiation in .  Complete rotator cuff tear or rupture of right shoulder, not specified as traumatic  Hypothyroidism  Hypertension  H/O shoulder surgery: total replacement right shoulder -   H/O total hysterectomy: 30 years ago      PMH of Gastroparesis [ ]  Gastric Surgery [ ]  Gastric Outlet Obstruction [ ]    Allergies:    Levaquin (Other)    Intolerances:    Latex allergy: [ ] yes [X] no    Medications:MEDICATIONS  (STANDING):  bisacodyl Suppository 10 milliGRAM(s) Rectal once  dextrose 5% + sodium chloride 0.45%. 1000 milliLiter(s) (75 mL/Hr) IV Continuous <Continuous>  docusate sodium 100 milliGRAM(s) Oral three times a day  dronabinol 2.5 milliGRAM(s) Oral two times a day  famotidine  IVPB 20 milliGRAM(s) IV Intermittent once  mirtazapine 15 milliGRAM(s) Oral at bedtime  ondansetron Injectable 4 milliGRAM(s) IV Push three times a day  pantoprazole   Suspension 40 milliGRAM(s) Oral before breakfast  polyethylene glycol 3350 17 Gram(s) Oral daily    MEDICATIONS  (PRN):  acetaminophen   Tablet. 650 milliGRAM(s) Oral every 8 hours PRN Mild Pain (1 - 3)  ALBUTerol    90 MICROgram(s) HFA Inhaler 2 Puff(s) Inhalation every 6 hours PRN Shortness of Breath and/or Wheezing  morphine  - Injectable 2 milliGRAM(s) IV Push every 6 hours PRN Severe Pain (7 - 10)  morphine  - Injectable 1 milliGRAM(s) IV Push every 3 hours PRN Moderate Pain (4 - 6)  oxyCODONE    IR 5 milliGRAM(s) Oral every 4 hours PRN Moderate Pain (4 - 6)  prochlorperazine   Injectable 5 milliGRAM(s) IntraMuscular every 6 hours PRN nausea      Smoking: [ ] yes  [X] no    AICD/PPM: [ ] yes   [X] no    Pertinent lab data:                        10.6   4.6   )-----------( 93       ( 2018 04:47 )             32.0         133<L>  |  96  |  5<L>  ----------------------------<  147<H>  3.5   |  27  |  0.86    Ca    8.9      2018 04:47    TPro  6.0  /  Alb  3.3  /  TBili  3.3<H>  /  DBili  x   /  AST  99<H>  /  ALT  109<H>  /  AlkPhos  623<H>      PT/INR - ( 2018 20:37 )   PT: 12.8 sec;   INR: 1.17 ratio      PTT - ( 2018 14:36 )  PTT:47.6 sec    < from: US Abdomen Complete (18 @ 10:39) >    IMPRESSION:     Redemonstration of an ill-defined pancreatic head mass. There is   resulting pancreatic and biliary ductal dilatation.    Distended gallbladder with intraluminal sludge, wall thickening,   pericholecystic fluid.     Small volume intraperitoneal ascites and small right pleural effusion.      Physical Examination:  Daily     Daily Weight in k.6 (2018 06:06)  Vital Signs Last 24 Hrs  T(C): 36.5 (2018 11:28), Max: 36.9 (2018 21:51)  T(F): 97.7 (2018 11:28), Max: 98.4 (2018 21:51)  HR: 74 (2018 11:28) (66 - 74)  BP: 113/75 (2018 11:28) (112/64 - 149/76)  BP(mean): --  RR: 18 (2018 11:28) (18 - 18)  SpO2: 96% (2018 11:28) (96% - 98%)    Drug Dosing Weight  Height (cm): 152.4 (2018 16:41)  Weight (kg): 76 (2018 16:41)  BMI (kg/m2): 32.7 (2018 16:41)  BSA (m2): 1.73 (2018 16:41)    Constitutional: NAD  Neck:  No JVD  Respiratory: CTAB/L  Cardiovascular: S1 and S2  Gastrointestinal: BS+, soft, NT/ND  Extremities: No peripheral edema  Neurological: A/O x 3, no focal deficits  : No Horner    Skin: No rashes    Comments: Heparin infusion held at 11am    ASA Class: I [ ]  II [ ]  III [X]  IV [ ]    The patient is a suitable candidate for the planned procedure unless box checked [ ]  No, explain:

## 2018-04-23 NOTE — PROVIDER CONTACT NOTE (CRITICAL VALUE NOTIFICATION) - ACTION/TREATMENT ORDERED:
NP Alvaro instructed to HOLD Heparin for 1 hour, retake Pt. weight, and restart Heparin drip as per nomogram protocol.
Follow heparin protocol & continue to monitor

## 2018-04-23 NOTE — PROGRESS NOTE ADULT - SUBJECTIVE AND OBJECTIVE BOX
Chief Complaint: fu    History of Present Illness:  no new complaints; decreased/poor appetite; nausea ok today; pain overall controlled; no vomiting, no dyspnea, no cough, no chest pain, no leg pain      MEDICATIONS  (STANDING):  bisacodyl Suppository 10 milliGRAM(s) Rectal once  dextrose 5% + sodium chloride 0.45%. 1000 milliLiter(s) (75 mL/Hr) IV Continuous <Continuous>  docusate sodium 100 milliGRAM(s) Oral three times a day  dronabinol 2.5 milliGRAM(s) Oral two times a day  famotidine  IVPB 20 milliGRAM(s) IV Intermittent once  mirtazapine 15 milliGRAM(s) Oral at bedtime  ondansetron Injectable 4 milliGRAM(s) IV Push three times a day  pantoprazole   Suspension 40 milliGRAM(s) Oral before breakfast  polyethylene glycol 3350 17 Gram(s) Oral daily    MEDICATIONS  (PRN):  acetaminophen   Tablet. 650 milliGRAM(s) Oral every 8 hours PRN Mild Pain (1 - 3)  ALBUTerol    90 MICROgram(s) HFA Inhaler 2 Puff(s) Inhalation every 6 hours PRN Shortness of Breath and/or Wheezing  morphine  - Injectable 2 milliGRAM(s) IV Push every 6 hours PRN Severe Pain (7 - 10)  morphine  - Injectable 1 milliGRAM(s) IV Push every 3 hours PRN Moderate Pain (4 - 6)  oxyCODONE    IR 5 milliGRAM(s) Oral every 4 hours PRN Moderate Pain (4 - 6)  prochlorperazine   Injectable 5 milliGRAM(s) IntraMuscular every 6 hours PRN nausea      Allergies    Levaquin (Other)    Intolerances        Vital Signs Last 24 Hrs  T(C): 36.5 (23 Apr 2018 11:28), Max: 36.9 (22 Apr 2018 21:51)  T(F): 97.7 (23 Apr 2018 11:28), Max: 98.4 (22 Apr 2018 21:51)  HR: 74 (23 Apr 2018 11:28) (66 - 74)  BP: 113/75 (23 Apr 2018 11:28) (112/64 - 149/76)  BP(mean): --  RR: 18 (23 Apr 2018 11:28) (18 - 18)  SpO2: 96% (23 Apr 2018 11:28) (96% - 98%)    PHYSICAL EXAM  General: adult in NAD  HEENT: clear oropharynx, sl icteric sclera, pink conjunctiva  Neck: supple  CV: normal S1/S2   Lungs: clear to auscultation  Abdomen: soft non-tender non-distended, positive bowel sounds  Ext: no clubbing cyanosis or edema  Skin: no rashes and no petechiae      LABS:                          10.6   4.6   )-----------( 93       ( 23 Apr 2018 04:47 )             32.0         Mean Cell Volume : 96.8 fl  Mean Cell Hemoglobin : 32.0 pg  Mean Cell Hemoglobin Concentration : 33.1 gm/dL  Auto Neutrophil # : x  Auto Lymphocyte # : x  Auto Monocyte # : x  Auto Eosinophil # : x  Auto Basophil # : x  Auto Neutrophil % : x  Auto Lymphocyte % : x  Auto Monocyte % : x  Auto Eosinophil % : x  Auto Basophil % : x      Serial CBC's  04-23 @ 04:47  Hct-32.0 / Hgb-10.6 / Plat-93 / RBC-3.31 / WBC-4.6  Serial CBC's  04-22 @ 09:48  Hct-31.1 / Hgb-10.1 / Plat-109 / RBC-3.25 / WBC-4.48  Serial CBC's  04-21 @ 14:58  Hct-33.3 / Hgb-10.6 / Plat-163 / RBC-3.41 / WBC-5.31  Serial CBC's  04-20 @ 09:37  Hct-28.0 / Hgb-9.1 / Plat-129 / RBC-2.94 / WBC-4.61      04-23    133<L>  |  96  |  5<L>  ----------------------------<  147<H>  3.5   |  27  |  0.86    Ca    8.9      23 Apr 2018 04:47    TPro  6.0  /  Alb  3.3  /  TBili  3.3<H>  /  DBili  x   /  AST  99<H>  /  ALT  109<H>  /  AlkPhos  623<H>  04-23      PT/INR - ( 22 Apr 2018 20:37 )   PT: 12.8 sec;   INR: 1.17 ratio         PTT - ( 23 Apr 2018 04:47 )  PTT:> 200 sec    Folate, Serum: >20.0 ng/mL (04-18 @ 10:59)  Iron - Total Binding Capacity.: 244 ug/dL (04-18 @ 10:59)  Ferritin, Serum: 163 ng/mL (04-18 @ 10:59)

## 2018-04-23 NOTE — CHART NOTE - NSCHARTNOTEFT_GEN_A_CORE
MEDICINE NP    SOURAV FRANCE  82y Female    Patient is a 82y old  Female who presents with a chief complaint of nausea and vomiting, inability to tolerate PO (16 Apr 2018 14:16)       > Event Summary:  Patient s/p ERCP with recommendations stating can restart Heparin  -Discussed w/ calvin Soto to restart Heparin infusion tonight (6hrs post procedure)   -Heparin infusion ordered per prior therapeutic dose, no bolus  -Trend aPTT, CBC   -Bleeding precautions   -Primary team to follow in AM                    FLORENCE Pope-[facility]  Medicine Department  #[id number] MEDICINE NP    SOURAV FRANCE  82y Female    Patient is a 82y old  Female who presents with a chief complaint of nausea and vomiting, inability to tolerate PO (16 Apr 2018 14:16)       > Event Summary:  Patient with Portal Vein Thrombosis, Heparin infusion on hold for procedure  Patient s/p ERCP with recommendations stating can restart Heparin  -Discussed w/ calvin Soto to restart Heparin infusion tonight (6hrs post procedure)   -Heparin infusion ordered per prior therapeutic dose, no bolus  -Trend aPTT, CBC   -Bleeding & precautions   -Primary team to follow in AM                    FLORENCE Pope-BC  Medicine Department  #[id number]

## 2018-04-23 NOTE — PROGRESS NOTE ADULT - SUBJECTIVE AND OBJECTIVE BOX
INTERVAL HPI/OVERNIGHT EVENTS:    patient aroused for assessment, she reports she is OK, NPO for pending biliary stent later today . She reports not eating or drinking much because of nausea . She denies a BM in 11 days, had a suppository that was not effective she said.    MEDICATIONS  (STANDING):  bisacodyl Suppository 10 milliGRAM(s) Rectal once  dextrose 5% + sodium chloride 0.45%. 1000 milliLiter(s) (75 mL/Hr) IV Continuous <Continuous>  docusate sodium 100 milliGRAM(s) Oral three times a day  dronabinol 2.5 milliGRAM(s) Oral two times a day  famotidine  IVPB 20 milliGRAM(s) IV Intermittent once  mirtazapine 15 milliGRAM(s) Oral at bedtime  ondansetron Injectable 4 milliGRAM(s) IV Push three times a day  pantoprazole   Suspension 40 milliGRAM(s) Oral before breakfast  polyethylene glycol 3350 17 Gram(s) Oral daily    MEDICATIONS  (PRN):  acetaminophen   Tablet. 650 milliGRAM(s) Oral every 8 hours PRN Mild Pain (1 - 3)  ALBUTerol    90 MICROgram(s) HFA Inhaler 2 Puff(s) Inhalation every 6 hours PRN Shortness of Breath and/or Wheezing  morphine  - Injectable 2 milliGRAM(s) IV Push every 6 hours PRN Severe Pain (7 - 10)  morphine  - Injectable 1 milliGRAM(s) IV Push every 3 hours PRN Moderate Pain (4 - 6)  oxyCODONE    IR 5 milliGRAM(s) Oral every 4 hours PRN Moderate Pain (4 - 6)  prochlorperazine   Injectable 5 milliGRAM(s) IntraMuscular every 6 hours PRN nausea      Allergies    Levaquin (Other)    Intolerances        Review of Systems:    General:  No fever or chills.   ENT:  No  throat, pain, no dysphagia  CV:  No chest pain  Resp:  No dyspnea, cough or  wheezing  GI, nausea, sub optimal po intake, constipation pattern       Vital Signs Last 24 Hrs  T(C): 36.5 (23 Apr 2018 11:28), Max: 36.9 (22 Apr 2018 21:51)  T(F): 97.7 (23 Apr 2018 11:28), Max: 98.4 (22 Apr 2018 21:51)  HR: 74 (23 Apr 2018 11:28) (66 - 74)  BP: 113/75 (23 Apr 2018 11:28) (112/64 - 149/76)  BP(mean): --  RR: 18 (23 Apr 2018 11:28) (18 - 18)  SpO2: 96% (23 Apr 2018 11:28) (96% - 98%)    PHYSICAL EXAM:    Constitutional: NAD, well-developed, non toxic   Neck:  supple  Respiratory: clear to auscultation b/l no rales, rhonchi, wheezing  Cardiovascular: S1 and S2, RRR  Gastrointestinal: hypoactive bowel sounds   Extremities: No peripheral edema, neg clubbing, cyanosis  Neurological: A/O x 3  Psychiatric: Normal mood, normal affect  Skin: No visible rashes      LABS:                        10.6   4.6   )-----------( 93       ( 23 Apr 2018 04:47 )             32.0     04-23    133<L>  |  96  |  5<L>  ----------------------------<  147<H>  3.5   |  27  |  0.86    Ca    8.9      23 Apr 2018 04:47    TPro  6.0  /  Alb  3.3  /  TBili  3.3<H>  /  DBili  x   /  AST  99<H>  /  ALT  109<H>  /  AlkPhos  623<H>  04-23    PT/INR - ( 22 Apr 2018 20:37 )   PT: 12.8 sec;   INR: 1.17 ratio         PTT - ( 23 Apr 2018 04:47 )  PTT:> 200 sec    LIVER FUNCTIONS - ( 23 Apr 2018 04:47 )  Alb: 3.3 g/dL / Pro: 6.0 g/dL / ALK PHOS: 623 U/L / ALT: 109 U/L / AST: 99 U/L / GGT: x         Lipase, Serum (04.15.18 @ 22:28)    Lipase, Serum: 34 U/L    Hemoglobin: 10.6 g/dL <L> [11.5 - 15.5] (04-23 @ 04:47)  Hemoglobin: 10.1 g/dL <L> [11.5 - 15.5] (04-22 @ 09:48)  Hemoglobin: 10.6 g/dL <L> [11.5 - 15.5] (04-21 @ 14:58)    Albumin, Serum: 3.3 g/dL [3.3 - 5.0] (04-23 @ 04:47)  Aspartate Aminotransferase (AST/SGOT): 99 U/L <H> [10 - 40] (04-23 @ 04:47)  Alanine Aminotransferase (ALT/SGPT): 109 U/L <H> [10 - 45] (04-23 @ 04:47)  Albumin, Serum: 3.4 g/dL [3.3 - 5.0] (04-22 @ 07:26)    Aspartate Aminotransferase (AST/SGOT): 123 U/L <H> [10 - 40] (04-22 @ 07:26)  Alanine Aminotransferase (ALT/SGPT): 120 U/L <H> [10 - 45] (04-22 @ 07:26)  Albumin, Serum: 3.8 g/dL [3.3 - 5.0] (04-21 @ 12:02)  Aspartate Aminotransferase (AST/SGOT): 134 U/L <H> [10 - 40] (04-21 @ 12:02)  Alanine Aminotransferase (ALT/SGPT): 120 U/L <H> [10 - 45] (04-21 @ 12:02)      RADIOLOGY & ADDITIONAL TESTS:   CT Abdomen and Pelvis w/ IV Cont (04.15.18 @ 21:49) >  XAM:  CT ABDOMEN AND PELVIS IC                          PROCEDURE DATE:  04/15/2018      INTERPRETATION:  CLINICAL INFORMATION: Abdominal pain, nausea, inability   to tolerate oral intake, recent diagnosis of pancreatic CA 3 weeks prior   at an outside institution.    COMPARISON: None.    PROCEDURE:   CT of the Abdomen and Pelvis was performed with intravenous contrast.   Intravenous contrast: 90 ml Omnipaque 350. 10 ml discarded.  Oral contrast: None.  Sagittal and coronal reformats were performed.    FINDINGS:    LOWER CHEST: 3 mm left lower lobe pulmonary nodule (series 3, image 13).   Bibasilar subsegmental atelectasis. Heavy mitral valve annulus   calcifications.    LIVER: Within normal limits.  BILE DUCTS: Mild to moderate intrahepatic and extrahepatic biliary ductal   dilatation the common bile duct measuring up to 1.0 cm.  GALLBLADDER: Distended with mild wall thickening measuring up to 5 mm and   mild pericholecystic fat stranding.  SPLEEN: Within normal limits.  PANCREAS: Irregular 3.5 cm pancreatic head/neck mass with atrophy of the   distal pancreas.  ADRENALS: Within normal limits.  KIDNEYS/URETERS: Subcentimeter hypodense bilateral renal foci, too small   to characterize.    BLADDER: Within normal limits.  REPRODUCTIVE ORGANS: Status post hysterectomy. No adnexal masses.    BOWEL: Small hiatal hernia. No bowel obstruction. Colonic diverticulosis   without evidence of acute diverticulitis. Appendix is unremarkable aside   from a few appendicoliths. No periappendiceal fat stranding or   inflammatory changes.  PERITONEUM: Small ascites  VESSELS:  Atherosclerotic calcifications. No contrast opacifying the main   portal vein compatible with thrombosis. Eccentric thrombus in the distal   superior mesenteric vein.  RETROPERITONEUM: No lymphadenopathy.    ABDOMINAL WALL: Small fat-containing umbilical hernia.  BONES: Degenerative changes of the spine. Sclerotic lesion in the right   sacrum likely a bone island.    IMPRESSION:     1. Irregular pancreatic head neck mass resulting in intrahepatic and   extrahepatic biliary ductal dilatation and gallbladder obstruction.  2. Main portal vein thrombus. Eccentric thrombus in the distal superior   mesenteric vein.    US Abdomen Complete (04.17.18 @ 10:39) >  EXAM:  US ABDOMEN COMPLETE                            PROCEDURE DATE:  04/17/2018      INTERPRETATION:  CLINICAL INFORMATION: Diffuse abdominal pain.    COMPARISON: CT abdomen/pelvis 4/15/2018.    TECHNIQUE: Sonography of the abdomen.     FINDINGS:    Liver: Mild to moderate intrahepatic biliary ductal dilatation. No focal   liver mass seen.    Bile ducts: Mild to moderate intrahepatic and extrahepatic biliary ductal   dilatation. Common bile duct measures up to 13 mm.     Gallbladder: Distended with intraluminal sludge. Mild wall thickening,   measuring 4 mm. Small volume pericholecystic fluid.     Pancreas: Redemonstration of an ill-defined pancreatic head mass,   measuring approximately 2.0 x 3.3 x 3.5. Atrophic pancreatic body and  tail. The pancreatic duct is dilated to 6 mm.    Spleen: 11.6 cm. Within normal limits.    Right kidney: 8.9 cm. No hydronephrosis.        Left kidney: 9.8 cm.  No hydronephrosis.        Ascites: Small volume perihepatic and perisplenic ascites.    Aorta and IVC: Visualized portions are within normal limits.    Miscellaneous: Small right pleural effusion.    IMPRESSION:     Redemonstration of an ill-defined pancreatic head mass. There is   resulting pancreatic and biliary ductal dilatation.    Distended gallbladder with intraluminal sludge, wall thickening,   pericholecystic fluid.     Small volume intraperitoneal ascites and small right pleural effusion.

## 2018-04-23 NOTE — PROGRESS NOTE ADULT - ASSESSMENT
82 female with pancreatic head/neck mass per sono/ CT . elevated LFTs ,NPO for ERCP and possible biliary stent placement planned. Patient has had poor po intake 2/2 to nausea . Constipation as described by patient.    Plan:  NPO for pending ERCP  Continue to offer Miralax, Colace , PPI   monitor Bowel movements .  Resume po diet per post ERCP orders   Offer Mag Citrate once po diet resumed  (creat  0.86)     NIKKI Vazquez-[facility]  [address] [place] [address]  [id number] [phone]

## 2018-04-24 LAB
ALBUMIN SERPL ELPH-MCNC: 3.5 G/DL — SIGNIFICANT CHANGE UP (ref 3.3–5)
ALP SERPL-CCNC: 580 U/L — HIGH (ref 40–120)
ALT FLD-CCNC: 88 U/L — HIGH (ref 10–45)
ANION GAP SERPL CALC-SCNC: 12 MMOL/L — SIGNIFICANT CHANGE UP (ref 5–17)
APTT BLD: 107.5 SEC — HIGH (ref 27.5–37.4)
APTT BLD: 24 SEC — LOW (ref 27.5–37.4)
APTT BLD: 49.8 SEC — HIGH (ref 27.5–37.4)
AST SERPL-CCNC: 61 U/L — HIGH (ref 10–40)
BASOPHILS # BLD AUTO: 0 K/UL — SIGNIFICANT CHANGE UP (ref 0–0.2)
BASOPHILS NFR BLD AUTO: 0 % — SIGNIFICANT CHANGE UP (ref 0–2)
BILIRUB SERPL-MCNC: 1.8 MG/DL — HIGH (ref 0.2–1.2)
BUN SERPL-MCNC: 8 MG/DL — SIGNIFICANT CHANGE UP (ref 7–23)
CALCIUM SERPL-MCNC: 9.7 MG/DL — SIGNIFICANT CHANGE UP (ref 8.4–10.5)
CHLORIDE SERPL-SCNC: 97 MMOL/L — SIGNIFICANT CHANGE UP (ref 96–108)
CO2 SERPL-SCNC: 26 MMOL/L — SIGNIFICANT CHANGE UP (ref 22–31)
CREAT SERPL-MCNC: 1 MG/DL — SIGNIFICANT CHANGE UP (ref 0.5–1.3)
EOSINOPHIL # BLD AUTO: 0 K/UL — SIGNIFICANT CHANGE UP (ref 0–0.5)
EOSINOPHIL NFR BLD AUTO: 0.1 % — SIGNIFICANT CHANGE UP (ref 0–6)
GLUCOSE SERPL-MCNC: 170 MG/DL — HIGH (ref 70–99)
HCT VFR BLD CALC: 28.8 % — LOW (ref 34.5–45)
HCT VFR BLD CALC: 29 % — LOW (ref 34.5–45)
HGB BLD-MCNC: 9.8 G/DL — LOW (ref 11.5–15.5)
HGB BLD-MCNC: 9.8 G/DL — LOW (ref 11.5–15.5)
LYMPHOCYTES # BLD AUTO: 0.5 K/UL — LOW (ref 1–3.3)
LYMPHOCYTES # BLD AUTO: 10.6 % — LOW (ref 13–44)
MCHC RBC-ENTMCNC: 32.8 PG — SIGNIFICANT CHANGE UP (ref 27–34)
MCHC RBC-ENTMCNC: 33.1 PG — SIGNIFICANT CHANGE UP (ref 27–34)
MCHC RBC-ENTMCNC: 33.8 GM/DL — SIGNIFICANT CHANGE UP (ref 32–36)
MCHC RBC-ENTMCNC: 34.1 GM/DL — SIGNIFICANT CHANGE UP (ref 32–36)
MCV RBC AUTO: 97 FL — SIGNIFICANT CHANGE UP (ref 80–100)
MCV RBC AUTO: 97.1 FL — SIGNIFICANT CHANGE UP (ref 80–100)
MONOCYTES # BLD AUTO: 0.3 K/UL — SIGNIFICANT CHANGE UP (ref 0–0.9)
MONOCYTES NFR BLD AUTO: 5.7 % — SIGNIFICANT CHANGE UP (ref 2–14)
NEUTROPHILS # BLD AUTO: 4 K/UL — SIGNIFICANT CHANGE UP (ref 1.8–7.4)
NEUTROPHILS NFR BLD AUTO: 83.7 % — HIGH (ref 43–77)
PLATELET # BLD AUTO: 95 K/UL — LOW (ref 150–400)
PLATELET # BLD AUTO: 99 K/UL — LOW (ref 150–400)
POTASSIUM SERPL-MCNC: 3.4 MMOL/L — LOW (ref 3.5–5.3)
POTASSIUM SERPL-SCNC: 3.4 MMOL/L — LOW (ref 3.5–5.3)
PROT SERPL-MCNC: 6.6 G/DL — SIGNIFICANT CHANGE UP (ref 6–8.3)
RBC # BLD: 2.97 M/UL — LOW (ref 3.8–5.2)
RBC # BLD: 2.99 M/UL — LOW (ref 3.8–5.2)
RBC # FLD: 14.9 % — HIGH (ref 10.3–14.5)
RBC # FLD: 14.9 % — HIGH (ref 10.3–14.5)
SODIUM SERPL-SCNC: 135 MMOL/L — SIGNIFICANT CHANGE UP (ref 135–145)
WBC # BLD: 4.5 K/UL — SIGNIFICANT CHANGE UP (ref 3.8–10.5)
WBC # BLD: 4.7 K/UL — SIGNIFICANT CHANGE UP (ref 3.8–10.5)
WBC # FLD AUTO: 4.5 K/UL — SIGNIFICANT CHANGE UP (ref 3.8–10.5)
WBC # FLD AUTO: 4.7 K/UL — SIGNIFICANT CHANGE UP (ref 3.8–10.5)

## 2018-04-24 RX ORDER — OXYCODONE HYDROCHLORIDE 5 MG/1
5 TABLET ORAL EVERY 4 HOURS
Qty: 0 | Refills: 0 | Status: DISCONTINUED | OUTPATIENT
Start: 2018-04-24 | End: 2018-04-27

## 2018-04-24 RX ORDER — SENNA PLUS 8.6 MG/1
2 TABLET ORAL AT BEDTIME
Qty: 0 | Refills: 0 | Status: DISCONTINUED | OUTPATIENT
Start: 2018-04-24 | End: 2018-04-25

## 2018-04-24 RX ORDER — FONDAPARINUX SODIUM 2.5 MG/.5ML
1 INJECTION, SOLUTION SUBCUTANEOUS
Qty: 42 | Refills: 0 | OUTPATIENT
Start: 2018-04-24 | End: 2018-05-14

## 2018-04-24 RX ORDER — POTASSIUM CHLORIDE 20 MEQ
20 PACKET (EA) ORAL ONCE
Qty: 0 | Refills: 0 | Status: COMPLETED | OUTPATIENT
Start: 2018-04-24 | End: 2018-04-24

## 2018-04-24 RX ORDER — ENOXAPARIN SODIUM 100 MG/ML
80 INJECTION SUBCUTANEOUS
Qty: 0 | Refills: 0 | Status: DISCONTINUED | OUTPATIENT
Start: 2018-04-24 | End: 2018-04-27

## 2018-04-24 RX ADMIN — Medication 100 MILLIGRAM(S): at 05:50

## 2018-04-24 RX ADMIN — OXYCODONE HYDROCHLORIDE 5 MILLIGRAM(S): 5 TABLET ORAL at 21:13

## 2018-04-24 RX ADMIN — ONDANSETRON 4 MILLIGRAM(S): 8 TABLET, FILM COATED ORAL at 05:49

## 2018-04-24 RX ADMIN — PANTOPRAZOLE SODIUM 40 MILLIGRAM(S): 20 TABLET, DELAYED RELEASE ORAL at 05:49

## 2018-04-24 RX ADMIN — HEPARIN SODIUM 900 UNIT(S)/HR: 5000 INJECTION INTRAVENOUS; SUBCUTANEOUS at 12:07

## 2018-04-24 RX ADMIN — MIRTAZAPINE 15 MILLIGRAM(S): 45 TABLET, ORALLY DISINTEGRATING ORAL at 21:13

## 2018-04-24 RX ADMIN — OXYCODONE HYDROCHLORIDE 5 MILLIGRAM(S): 5 TABLET ORAL at 15:47

## 2018-04-24 RX ADMIN — SODIUM CHLORIDE 75 MILLILITER(S): 9 INJECTION, SOLUTION INTRAVENOUS at 12:08

## 2018-04-24 RX ADMIN — HEPARIN SODIUM 1100 UNIT(S)/HR: 5000 INJECTION INTRAVENOUS; SUBCUTANEOUS at 01:12

## 2018-04-24 RX ADMIN — Medication 2.5 MILLIGRAM(S): at 18:09

## 2018-04-24 RX ADMIN — OXYCODONE HYDROCHLORIDE 5 MILLIGRAM(S): 5 TABLET ORAL at 22:00

## 2018-04-24 RX ADMIN — SODIUM CHLORIDE 75 MILLILITER(S): 9 INJECTION, SOLUTION INTRAVENOUS at 21:14

## 2018-04-24 RX ADMIN — Medication 2.5 MILLIGRAM(S): at 05:50

## 2018-04-24 NOTE — PROGRESS NOTE ADULT - ASSESSMENT
The pt is an 82F with hx HTN, asthma, anal cancer s/p radiation/chemo with recent diagnosis of pancreatic cancer, was scheduled to start chemo at [facility] 4/18, now presents with nausea/vomiting/abdominal pain.    #Pancreatic cancer- recent diagnosis, was to start chemotherapy gemzar/abraxane at [facility] on 4/18; with increased abdominal pain/n/v  - CT with intrahepatic and extrahepatic biliary dilation  - on morphine prn for pain- added oral option with oxycodone   - pathology obtained from previous biopsy, placed in chart; Dr. Salcido discussed with previous Oncologist  - GI following- celiac plexus block a possibility depending on course- currently pain controlled with medication  - with biliary obstruction; s/p ERCP with malignant appearing stricture s/p sphincterotomy/stent 4/23- bilirubin improved  - placed on chemo schedule for 4/25; hopeful for further decrease in bilirubin  - cont antiemetics    #Anemia, normocytic- likely AOCD; no evidence of bleeding; was on oral iron previously  - iron studies/ b12/folate normal  - Hg remains stable, monitor    #thrombocytopenia- acute illness, thrombosis  - platelets fluctuating, sl improved today    # 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  - lovenox was changed to heparin gtt prior to procedure; switch to lovenox while inpatient; xarelto on discharge    #constipation  - likely opiate induced - cont aggressive bowel regimen    D/W Dr. Augustin  d/w daughter bedside

## 2018-04-24 NOTE — PROGRESS NOTE ADULT - SUBJECTIVE AND OBJECTIVE BOX
Patient is a 82y old  Female who presented with a chief complaint of nausea and vomiting, inability to tolerate PO (16 Apr 2018 14:16)      INTERVAL HPI/OVERNIGHT EVENTS:  s/p ERCP with stent placement yesterday  no abdominal pain  tolerating PO diet, appetite improving  no fever or chills    MEDICATIONS  (STANDING):  bisacodyl Suppository 10 milliGRAM(s) Rectal once  dextrose 5% + sodium chloride 0.45%. 1000 milliLiter(s) (75 mL/Hr) IV Continuous <Continuous>  docusate sodium 100 milliGRAM(s) Oral three times a day  dronabinol 2.5 milliGRAM(s) Oral two times a day  famotidine  IVPB 20 milliGRAM(s) IV Intermittent once  heparin  Infusion. 1100 Unit(s)/Hr (11 mL/Hr) IV Continuous <Continuous>  mirtazapine 15 milliGRAM(s) Oral at bedtime  pantoprazole   Suspension 40 milliGRAM(s) Oral before breakfast  polyethylene glycol 3350 17 Gram(s) Oral daily  senna 2 Tablet(s) Oral at bedtime    MEDICATIONS  (PRN):  acetaminophen   Tablet. 650 milliGRAM(s) Oral every 8 hours PRN Mild Pain (1 - 3)  ALBUTerol    90 MICROgram(s) HFA Inhaler 2 Puff(s) Inhalation every 6 hours PRN Shortness of Breath and/or Wheezing  heparin  Injectable 6500 Unit(s) IV Push every 6 hours PRN For aPTT less than 40  heparin  Injectable 3000 Unit(s) IV Push every 6 hours PRN For aPTT between 40 - 57  morphine  - Injectable 2 milliGRAM(s) IV Push every 6 hours PRN Severe Pain (7 - 10)  morphine  - Injectable 1 milliGRAM(s) IV Push every 3 hours PRN Moderate Pain (4 - 6)  oxyCODONE    IR 5 milliGRAM(s) Oral every 4 hours PRN Moderate Pain (4 - 6)  prochlorperazine   Injectable 5 milliGRAM(s) IntraMuscular every 6 hours PRN nausea      Allergies  Levaquin (Other)      Vital Signs Last 24 Hrs  T(C): 36.6 (24 Apr 2018 08:32), Max: 37 (23 Apr 2018 20:48)  T(F): 97.9 (24 Apr 2018 08:32), Max: 98.6 (23 Apr 2018 20:48)  HR: 72 (24 Apr 2018 08:32) (72 - 81)  BP: 131/71 (24 Apr 2018 08:32) (125/75 - 131/71)  BP(mean): --  RR: 18 (24 Apr 2018 08:32) (18 - 18)  SpO2: 95% (24 Apr 2018 08:32) (95% - 96%)    PHYSICAL EXAM:  Constitutional: A&OX3, in NAD  HEENT: NCAT, + scleral icterus, no palpable LAD  Cardiovascular: RRR, S1 and S2 +murmur  Respiratory: CTAB   Gastrointestinal: obese, soft, NTND, normoactive bowel sounds, no palpable HSM  Extremities: No peripheral edema b/l     LABS:                        9.8    4.7   )-----------( 95       ( 24 Apr 2018 07:36 )             29.0     04-23    133<L>  |  96  |  5<L>  ----------------------------<  147<H>  3.5   |  27  |  0.86    Ca    8.9      23 Apr 2018 04:47    TPro  6.0  /  Alb  3.3  /  TBili  3.3<H>  /  DBili  x   /  AST  99<H>  /  ALT  109<H>  /  AlkPhos  623<H>  04-23    PT/INR - ( 22 Apr 2018 20:37 )   PT: 12.8 sec;   INR: 1.17 ratio         PTT - ( 24 Apr 2018 07:36 )  PTT:107.5 sec         RADIOLOGY & ADDITIONAL TESTS:  < from: ERCP (04.23.18 @ 17:38) >  Impression:          - The major papilla appeared normal.                       - A localized biliary stricture was found. The stricture was malignant                        appearing.                       - A sphincterotomy was performed.               - One uncovered metal stent was placed into the common bile duct.  Recommendation:      - [facility] for [facility].                       - Can restart Heparin                       - Resume prior diet            - AM labs Patient is a 82y old  Female who presented with a chief complaint of nausea and vomiting, inability to tolerate PO (16 Apr 2018 14:16)    INTERVAL HPI/OVERNIGHT EVENTS:  s/p ERCP with stent placement yesterday  no abdominal pain  tolerating PO diet, appetite improving  no fever or chills    MEDICATIONS  (STANDING):  bisacodyl Suppository 10 milliGRAM(s) Rectal once  dextrose 5% + sodium chloride 0.45%. 1000 milliLiter(s) (75 mL/Hr) IV Continuous <Continuous>  docusate sodium 100 milliGRAM(s) Oral three times a day  dronabinol 2.5 milliGRAM(s) Oral two times a day  famotidine  IVPB 20 milliGRAM(s) IV Intermittent once  heparin  Infusion. 1100 Unit(s)/Hr (11 mL/Hr) IV Continuous <Continuous>  mirtazapine 15 milliGRAM(s) Oral at bedtime  pantoprazole   Suspension 40 milliGRAM(s) Oral before breakfast  polyethylene glycol 3350 17 Gram(s) Oral daily  senna 2 Tablet(s) Oral at bedtime    MEDICATIONS  (PRN):  acetaminophen   Tablet. 650 milliGRAM(s) Oral every 8 hours PRN Mild Pain (1 - 3)  ALBUTerol    90 MICROgram(s) HFA Inhaler 2 Puff(s) Inhalation every 6 hours PRN Shortness of Breath and/or Wheezing  heparin  Injectable 6500 Unit(s) IV Push every 6 hours PRN For aPTT less than 40  heparin  Injectable 3000 Unit(s) IV Push every 6 hours PRN For aPTT between 40 - 57  morphine  - Injectable 2 milliGRAM(s) IV Push every 6 hours PRN Severe Pain (7 - 10)  morphine  - Injectable 1 milliGRAM(s) IV Push every 3 hours PRN Moderate Pain (4 - 6)  oxyCODONE    IR 5 milliGRAM(s) Oral every 4 hours PRN Moderate Pain (4 - 6)  prochlorperazine   Injectable 5 milliGRAM(s) IntraMuscular every 6 hours PRN nausea    Allergies  Levaquin (Other)    Vital Signs Last 24 Hrs  T(C): 36.6 (24 Apr 2018 08:32), Max: 37 (23 Apr 2018 20:48)  T(F): 97.9 (24 Apr 2018 08:32), Max: 98.6 (23 Apr 2018 20:48)  HR: 72 (24 Apr 2018 08:32) (72 - 81)  BP: 131/71 (24 Apr 2018 08:32) (125/75 - 131/71)  BP(mean): --  RR: 18 (24 Apr 2018 08:32) (18 - 18)  SpO2: 95% (24 Apr 2018 08:32) (95% - 96%)    PHYSICAL EXAM:  Constitutional: A&OX3, in NAD  HEENT: NCAT, + scleral icterus, no palpable LAD  Cardiovascular: RRR, S1 and S2 +murmur  Respiratory: CTAB   Gastrointestinal: obese, soft, NTND, normoactive bowel sounds, no palpable HSM  Extremities: No peripheral edema b/l     LABS:                      9.8    4.7   )-----------( 95       ( 24 Apr 2018 07:36 )             29.0   04-23    133<L>  |  96  |  5<L>  ----------------------------<  147<H>  3.5   |  27  |  0.86    Ca    8.9      23 Apr 2018 04:47    TPro  6.0  /  Alb  3.3  /  TBili  3.3<H>  /  DBili  x   /  AST  99<H>  /  ALT  109<H>  /  AlkPhos  623<H>  04-23    PT/INR - ( 22 Apr 2018 20:37 )   PT: 12.8 sec;   INR: 1.17 ratio    PTT - ( 24 Apr 2018 07:36 )  PTT:107.5 sec     RADIOLOGY & ADDITIONAL TESTS:  < from: ERCP (04.23.18 @ 17:38) >  Impression:          - The major papilla appeared normal.                       - A localized biliary stricture was found. The stricture was malignant                        appearing.                       - A sphincterotomy was performed.               - One uncovered metal stent was placed into the common bile duct.  Recommendation:      - Return patient to [facility] for ongoing care.                       - Can restart Heparin                       - Resume prior diet            - AM labs

## 2018-04-24 NOTE — PROGRESS NOTE ADULT - SUBJECTIVE AND OBJECTIVE BOX
SUBJECTIVE: pt with an appetite today. no nausea or emesis. no abdominal pain, fever, or chills    MEDICATIONS  (STANDING):  bisacodyl Suppository 10 milliGRAM(s) Rectal once  dextrose 5% + sodium chloride 0.45%. 1000 milliLiter(s) (75 mL/Hr) IV Continuous <Continuous>  docusate sodium 100 milliGRAM(s) Oral three times a day  dronabinol 2.5 milliGRAM(s) Oral two times a day  famotidine  IVPB 20 milliGRAM(s) IV Intermittent once  heparin  Infusion. 1100 Unit(s)/Hr (11 mL/Hr) IV Continuous <Continuous>  mirtazapine 15 milliGRAM(s) Oral at bedtime  ondansetron Injectable 4 milliGRAM(s) IV Push three times a day  pantoprazole   Suspension 40 milliGRAM(s) Oral before breakfast  polyethylene glycol 3350 17 Gram(s) Oral daily    MEDICATIONS  (PRN):  acetaminophen   Tablet. 650 milliGRAM(s) Oral every 8 hours PRN Mild Pain (1 - 3)  ALBUTerol    90 MICROgram(s) HFA Inhaler 2 Puff(s) Inhalation every 6 hours PRN Shortness of Breath and/or Wheezing  heparin  Injectable 6500 Unit(s) IV Push every 6 hours PRN For aPTT less than 40  heparin  Injectable 3000 Unit(s) IV Push every 6 hours PRN For aPTT between 40 - 57  morphine  - Injectable 2 milliGRAM(s) IV Push every 6 hours PRN Severe Pain (7 - 10)  morphine  - Injectable 1 milliGRAM(s) IV Push every 3 hours PRN Moderate Pain (4 - 6)  oxyCODONE    IR 5 milliGRAM(s) Oral every 4 hours PRN Moderate Pain (4 - 6)  prochlorperazine   Injectable 5 milliGRAM(s) IntraMuscular every 6 hours PRN nausea    OBJECTIVE:  Vital Signs Last 24 Hrs  T(C): 36.6 (24 Apr 2018 08:32), Max: 37 (23 Apr 2018 20:48)  T(F): 97.9 (24 Apr 2018 08:32), Max: 98.6 (23 Apr 2018 20:48)  HR: 72 (24 Apr 2018 08:32) (72 - 81)  BP: 131/71 (24 Apr 2018 08:32) (113/75 - 131/71)  BP(mean): --  RR: 18 (24 Apr 2018 08:32) (18 - 18)  SpO2: 95% (24 Apr 2018 08:32) (95% - 96%)    PHYSICAL EXAM:  Constitutional: A&OX3, in NAD,   HEENT: NCAT, + scleral icterus, no palpable LAD  Cardiovascular: RRR, S1 and S2 +systolic murmur  Respiratory: CTAB   Gastrointestinal: soft, NTND, normoactive bowel sounds, no palpable HSM  Extremities: No peripheral edema b/l     LABS:                        9.8    4.7   )-----------( 95       ( 24 Apr 2018 07:36 )             29.0     04-23    133<L>  |  96  |  5<L>  ----------------------------<  147<H>  3.5   |  27  |  0.86    Ca    8.9      23 Apr 2018 04:47    TPro  6.0  /  Alb  3.3  /  TBili  3.3<H>  /  DBili  x   /  AST  99<H>  /  ALT  109<H>  /  AlkPhos  623<H>  04-23    PT/INR - ( 22 Apr 2018 20:37 )   PT: 12.8 sec;   INR: 1.17 ratio         PTT - ( 24 Apr 2018 07:36 )  PTT:107.5 sec    LIVER FUNCTIONS - ( 23 Apr 2018 04:47 )  Alb: 3.3 g/dL / Pro: 6.0 g/dL / ALK PHOS: 623 U/L / ALT: 109 U/L / AST: 99 U/L / GGT: x             RADIOLOGY & ADDITIONAL TESTS:

## 2018-04-24 NOTE — PROGRESS NOTE ADULT - ASSESSMENT
82 female with recently diagnosed pancreatic cancer with biliary obstruction, palliated with a biliary stent yesterday. Feeling well today. Appetite improved  -trend LFTs (to be drawn now)  -further workup management per oncology/primary GI team

## 2018-04-24 NOTE — PROGRESS NOTE ADULT - ASSESSMENT
82 year old female with history of recently diagnosed unresectable pancreatic cancer who presents with intractable abdominal pain (improving) and poor PO intolerance.    Nausea and poor PO intake- slowly improving  4/23 ERCP with sphincterotomy and metal biliary stent placement  Constipation - likely secondary to narcotics    Plan:  Trend LFTs   Bowel regimen  PO diet as tolerated  AC per primary team  Heme/Onc following    Joseph Burgos PA-C    [facility]  [phone] 82 year old female with history of recently diagnosed unresectable pancreatic cancer who presents with intractable abdominal pain (improving) and poor PO intolerance.    Nausea and poor PO intake- slowly improving  4/23 ERCP with sphincterotomy and metal biliary stent placement  Constipation - likely secondary to narcotics    Plan:  Trend LFTs   Bowel regimen - added Senna  PO diet as tolerated  AC per primary team  Heme/Onc following    Joseph Burgos PA-C    [facility]  [phone]

## 2018-04-24 NOTE — PROGRESS NOTE ADULT - SUBJECTIVE AND OBJECTIVE BOX
Patient is a 82y old  Female who presents with a chief complaint of nausea and vomiting, inability to tolerate PO (16 Apr 2018 14:16)                                                               INTERVAL HPI/OVERNIGHT EVENTS:    REVIEW OF SYSTEMS:     CONSTITUTIONAL: No weakness, fevers or chills..improving appetite   RESPIRATORY: No cough, wheezing,  No shortness of breath  CARDIOVASCULAR: No chest pain or palpitations  GASTROINTESTINAL: No abdominal pain  . mild nausea, vomiting, contispated   GENITOURINARY: No dysuria, frequency or hematuria  NEUROLOGICAL: No numbness or weakness                                                                                                                                                                                                                                                                                  Medications:  MEDICATIONS  (STANDING):  bisacodyl Suppository 10 milliGRAM(s) Rectal once  dextrose 5% + sodium chloride 0.45%. 1000 milliLiter(s) (75 mL/Hr) IV Continuous <Continuous>  docusate sodium 100 milliGRAM(s) Oral three times a day  dronabinol 2.5 milliGRAM(s) Oral two times a day  enoxaparin Injectable 80 milliGRAM(s) SubCutaneous two times a day  famotidine  IVPB 20 milliGRAM(s) IV Intermittent once  mirtazapine 15 milliGRAM(s) Oral at bedtime  pantoprazole   Suspension 40 milliGRAM(s) Oral before breakfast  polyethylene glycol 3350 17 Gram(s) Oral daily  potassium chloride    Tablet ER 20 milliEquivalent(s) Oral once  senna 2 Tablet(s) Oral at bedtime    MEDICATIONS  (PRN):  acetaminophen   Tablet. 650 milliGRAM(s) Oral every 8 hours PRN Mild Pain (1 - 3)  ALBUTerol    90 MICROgram(s) HFA Inhaler 2 Puff(s) Inhalation every 6 hours PRN Shortness of Breath and/or Wheezing  oxyCODONE    IR 5 milliGRAM(s) Oral every 4 hours PRN Moderate Pain (4 - 6)  prochlorperazine   Injectable 5 milliGRAM(s) IntraMuscular every 6 hours PRN nausea       Allergies    Levaquin (Other)    Intolerances      Vital Signs Last 24 Hrs  T(C): 36.6 (24 Apr 2018 08:32), Max: 37 (23 Apr 2018 20:48)  T(F): 97.9 (24 Apr 2018 08:32), Max: 98.6 (23 Apr 2018 20:48)  HR: 72 (24 Apr 2018 08:32) (72 - 81)  BP: 131/71 (24 Apr 2018 08:32) (125/75 - 131/71)  BP(mean): --  RR: 18 (24 Apr 2018 08:32) (18 - 18)  SpO2: 95% (24 Apr 2018 08:32) (95% - 96%)  CAPILLARY BLOOD GLUCOSE          04-23 @ 07:01  -  04-24 @ 07:00  --------------------------------------------------------  IN: 2478 mL / OUT: 0 mL / NET: 2478 mL    04-24 @ 07:01 - 04-24 @ 15:13  --------------------------------------------------------  IN: 500 mL / OUT: 0 mL / NET: 500 mL      Physical Exam:    General:  NAD   HEENT:  Nonicteric,  CV:  RRR, S1S2   Lungs:  CTA B/L, no wheezes, rales, rhonchi  Abdomen:  Soft, non-tender, no distended, positive BS  Extremities:  2+ pulses, no c/c, no edema  Skin:  Warm and dry, no rashes  :  No metzger  Neuro:  AAOx3, non-focal, grossly intact                                                                                                                                                                                                                                                                                                LABS:                               9.8    4.7   )-----------( 95       ( 24 Apr 2018 07:36 )             29.0                      04-24    135  |  97  |  8   ----------------------------<  170<H>  3.4<L>   |  26  |  1.00    Ca    9.7      24 Apr 2018 11:38    TPro  6.6  /  Alb  3.5  /  TBili  1.8<H>  /  DBili  x   /  AST  61<H>  /  ALT  88<H>  /  AlkPhos  580<H>  04-24

## 2018-04-24 NOTE — CHART NOTE - NSCHARTNOTEFT_GEN_A_CORE
89 y/o patient with portal vein thrombosis. Was on heparin drip, now discontinued,  and started on Lovenox as recommended by Dr gunn. Plan is to send home on Xarelto  prescription sent to [facility].  Cheryl Hawk NP  [phone]

## 2018-04-24 NOTE — PROGRESS NOTE ADULT - ASSESSMENT
81 y/o female w hx of anal cancer in remission s/p radiation and chemo , HTN,Asthma , recently evaluated for abd pain and found to have panreatic mass with CT done on 3/27 showing 3.4 cm pancreatic head mass with moderaltely distended bladder , no dilatation of bile duct ( at the time ), and ecasemenet of adjacement vasculature , underwent EUS with bx and path consistent with pancreatic Ca per family , shceduled to start chemo at [place] this week however presenting now with abd pain, N/V with no fever .. CT shows dilatation of biliary duct but nl Bili.. no fever or chills..   1- Abd pain /N/v : sec to cancer .. some improvement in Nausea and appetite   .. cont pain meds . cont compazine   consider  celiac plexus  block   2- pancreatic Ca : not likley to be a surgical candidate ..   TB : trending down s/p stent..cpont to monitor     2- pancreatic Ca : d/w :  plan for possible inpt chemo tmmrw if bili continues to trend down       3- DVT of < from: CT Abdomen and Pelvis w/ IV Cont (04.15.18 @ 21:49) >  Main portal vein thrombus. Eccentric thrombus in the distal superior   mesenteric vein.  cont lovenox : will change to  xarelto upon dc since pt and family not interested in lovenox     4- anemia : monitor H/H   5- thrombocytopenia : monitor for now ..  6- constipation : increase miralax ..encouraged pt to take meds

## 2018-04-24 NOTE — PROGRESS NOTE ADULT - SUBJECTIVE AND OBJECTIVE BOX
Chief Complaint: fu    History of Present Illness:  s/p ERCP/stent 4/23; feels improved; sitting in chair attempting to eat; no f/c, no cp, no dyspnea, no cough, nausea improved, no vomiting; abd pain stable; no leg pain, no bleeding      MEDICATIONS  (STANDING):  bisacodyl Suppository 10 milliGRAM(s) Rectal once  dextrose 5% + sodium chloride 0.45%. 1000 milliLiter(s) (75 mL/Hr) IV Continuous <Continuous>  docusate sodium 100 milliGRAM(s) Oral three times a day  dronabinol 2.5 milliGRAM(s) Oral two times a day  enoxaparin Injectable 80 milliGRAM(s) SubCutaneous two times a day  famotidine  IVPB 20 milliGRAM(s) IV Intermittent once  mirtazapine 15 milliGRAM(s) Oral at bedtime  pantoprazole   Suspension 40 milliGRAM(s) Oral before breakfast  polyethylene glycol 3350 17 Gram(s) Oral daily  potassium chloride    Tablet ER 20 milliEquivalent(s) Oral once  senna 2 Tablet(s) Oral at bedtime    MEDICATIONS  (PRN):  acetaminophen   Tablet. 650 milliGRAM(s) Oral every 8 hours PRN Mild Pain (1 - 3)  ALBUTerol    90 MICROgram(s) HFA Inhaler 2 Puff(s) Inhalation every 6 hours PRN Shortness of Breath and/or Wheezing  oxyCODONE    IR 5 milliGRAM(s) Oral every 4 hours PRN Moderate Pain (4 - 6)  prochlorperazine   Injectable 5 milliGRAM(s) IntraMuscular every 6 hours PRN nausea      Allergies    Levaquin (Other)    Intolerances        Vital Signs Last 24 Hrs  T(C): 36.6 (24 Apr 2018 08:32), Max: 37 (23 Apr 2018 20:48)  T(F): 97.9 (24 Apr 2018 08:32), Max: 98.6 (23 Apr 2018 20:48)  HR: 72 (24 Apr 2018 08:32) (72 - 81)  BP: 131/71 (24 Apr 2018 08:32) (125/75 - 131/71)  BP(mean): --  RR: 18 (24 Apr 2018 08:32) (18 - 18)  SpO2: 95% (24 Apr 2018 08:32) (95% - 96%)    PHYSICAL EXAM  General: adult in NAD  HEENT: clear oropharynx, scleral icterus, pink conjunctiva  Neck: supple  CV: normal S1/S2   Lungs: clear to auscultation, no wheezes, no rales  Abdomen: soft non-tender non-distended, positive bowel sounds  Ext: no clubbing cyanosis or edema      LABS:                          9.8    4.7   )-----------( 95       ( 24 Apr 2018 07:36 )             29.0         Mean Cell Volume : 97.1 fl  Mean Cell Hemoglobin : 32.8 pg  Mean Cell Hemoglobin Concentration : 33.8 gm/dL  Auto Neutrophil # : 4.0 K/uL  Auto Lymphocyte # : 0.5 K/uL  Auto Monocyte # : 0.3 K/uL  Auto Eosinophil # : 0.0 K/uL  Auto Basophil # : 0.0 K/uL  Auto Neutrophil % : 83.7 %  Auto Lymphocyte % : 10.6 %  Auto Monocyte % : 5.7 %  Auto Eosinophil % : 0.1 %  Auto Basophil % : 0.0 %      Serial CBC's  04-24 @ 07:36  Hct-29.0 / Hgb-9.8 / Plat-95 / RBC-2.99 / WBC-4.7  Serial CBC's  04-23 @ 04:47  Hct-32.0 / Hgb-10.6 / Plat-93 / RBC-3.31 / WBC-4.6  Serial CBC's  04-22 @ 09:48  Hct-31.1 / Hgb-10.1 / Plat-109 / RBC-3.25 / WBC-4.48  Serial CBC's  04-21 @ 14:58  Hct-33.3 / Hgb-10.6 / Plat-163 / RBC-3.41 / WBC-5.31      04-24    135  |  97  |  8   ----------------------------<  170<H>  3.4<L>   |  26  |  1.00    Ca    9.7      24 Apr 2018 11:38    TPro  6.6  /  Alb  3.5  /  TBili  1.8<H>  /  DBili  x   /  AST  61<H>  /  ALT  88<H>  /  AlkPhos  580<H>  04-24      PT/INR - ( 22 Apr 2018 20:37 )   PT: 12.8 sec;   INR: 1.17 ratio         PTT - ( 24 Apr 2018 07:36 )  PTT:107.5 sec    Folate, Serum: >20.0 ng/mL (04-18 @ 10:59)  Iron - Total Binding Capacity.: 244 ug/dL (04-18 @ 10:59)  Ferritin, Serum: 163 ng/mL (04-18 @ 10:59)              Radiology:

## 2018-04-25 LAB
ALBUMIN SERPL ELPH-MCNC: 3.2 G/DL — LOW (ref 3.3–5)
ALP SERPL-CCNC: 549 U/L — HIGH (ref 40–120)
ALT FLD-CCNC: 75 U/L — HIGH (ref 10–45)
ANION GAP SERPL CALC-SCNC: 12 MMOL/L — SIGNIFICANT CHANGE UP (ref 5–17)
AST SERPL-CCNC: 59 U/L — HIGH (ref 10–40)
BILIRUB SERPL-MCNC: 1.4 MG/DL — HIGH (ref 0.2–1.2)
BUN SERPL-MCNC: 7 MG/DL — SIGNIFICANT CHANGE UP (ref 7–23)
CALCIUM SERPL-MCNC: 9 MG/DL — SIGNIFICANT CHANGE UP (ref 8.4–10.5)
CHLORIDE SERPL-SCNC: 98 MMOL/L — SIGNIFICANT CHANGE UP (ref 96–108)
CO2 SERPL-SCNC: 27 MMOL/L — SIGNIFICANT CHANGE UP (ref 22–31)
CREAT SERPL-MCNC: 0.92 MG/DL — SIGNIFICANT CHANGE UP (ref 0.5–1.3)
GLUCOSE SERPL-MCNC: 134 MG/DL — HIGH (ref 70–99)
HCT VFR BLD CALC: 29.9 % — LOW (ref 34.5–45)
HGB BLD-MCNC: 9.8 G/DL — LOW (ref 11.5–15.5)
MCHC RBC-ENTMCNC: 32 PG — SIGNIFICANT CHANGE UP (ref 27–34)
MCHC RBC-ENTMCNC: 32.7 GM/DL — SIGNIFICANT CHANGE UP (ref 32–36)
MCV RBC AUTO: 97.7 FL — SIGNIFICANT CHANGE UP (ref 80–100)
PLATELET # BLD AUTO: 98 K/UL — LOW (ref 150–400)
POTASSIUM SERPL-MCNC: 3.4 MMOL/L — LOW (ref 3.5–5.3)
POTASSIUM SERPL-SCNC: 3.4 MMOL/L — LOW (ref 3.5–5.3)
PROT SERPL-MCNC: 6 G/DL — SIGNIFICANT CHANGE UP (ref 6–8.3)
RBC # BLD: 3.06 M/UL — LOW (ref 3.8–5.2)
RBC # FLD: 15.1 % — HIGH (ref 10.3–14.5)
SODIUM SERPL-SCNC: 137 MMOL/L — SIGNIFICANT CHANGE UP (ref 135–145)
WBC # BLD: 7.08 K/UL — SIGNIFICANT CHANGE UP (ref 3.8–10.5)
WBC # FLD AUTO: 7.08 K/UL — SIGNIFICANT CHANGE UP (ref 3.8–10.5)

## 2018-04-25 PROCEDURE — 74018 RADEX ABDOMEN 1 VIEW: CPT | Mod: 26

## 2018-04-25 RX ORDER — DRONABINOL 2.5 MG
2.5 CAPSULE ORAL
Qty: 0 | Refills: 0 | Status: DISCONTINUED | OUTPATIENT
Start: 2018-04-25 | End: 2018-04-27

## 2018-04-25 RX ORDER — ONDANSETRON 8 MG/1
16 TABLET, FILM COATED ORAL ONCE
Qty: 0 | Refills: 0 | Status: COMPLETED | OUTPATIENT
Start: 2018-04-25 | End: 2018-04-25

## 2018-04-25 RX ORDER — DEXAMETHASONE 0.5 MG/5ML
12 ELIXIR ORAL ONCE
Qty: 0 | Refills: 0 | Status: COMPLETED | OUTPATIENT
Start: 2018-04-25 | End: 2018-04-25

## 2018-04-25 RX ORDER — GEMCITABINE 38 MG/ML
1400 INJECTION, SOLUTION INTRAVENOUS ONCE
Qty: 0 | Refills: 0 | Status: DISCONTINUED | OUTPATIENT
Start: 2018-04-25 | End: 2018-04-27

## 2018-04-25 RX ORDER — SENNA PLUS 8.6 MG/1
2 TABLET ORAL AT BEDTIME
Qty: 0 | Refills: 0 | Status: DISCONTINUED | OUTPATIENT
Start: 2018-04-25 | End: 2018-04-27

## 2018-04-25 RX ORDER — SENNA PLUS 8.6 MG/1
TABLET ORAL
Qty: 0 | Refills: 0 | Status: DISCONTINUED | OUTPATIENT
Start: 2018-04-25 | End: 2018-04-27

## 2018-04-25 RX ORDER — POTASSIUM CHLORIDE 20 MEQ
40 PACKET (EA) ORAL ONCE
Qty: 0 | Refills: 0 | Status: DISCONTINUED | OUTPATIENT
Start: 2018-04-25 | End: 2018-04-27

## 2018-04-25 RX ORDER — SENNA PLUS 8.6 MG/1
2 TABLET ORAL ONCE
Qty: 0 | Refills: 0 | Status: COMPLETED | OUTPATIENT
Start: 2018-04-25 | End: 2018-04-25

## 2018-04-25 RX ADMIN — OXYCODONE HYDROCHLORIDE 5 MILLIGRAM(S): 5 TABLET ORAL at 20:37

## 2018-04-25 RX ADMIN — Medication 2.5 MILLIGRAM(S): at 19:07

## 2018-04-25 RX ADMIN — ONDANSETRON 116 MILLIGRAM(S): 8 TABLET, FILM COATED ORAL at 16:15

## 2018-04-25 RX ADMIN — Medication 100 MILLIGRAM(S): at 18:55

## 2018-04-25 RX ADMIN — ENOXAPARIN SODIUM 80 MILLIGRAM(S): 100 INJECTION SUBCUTANEOUS at 05:16

## 2018-04-25 RX ADMIN — MIRTAZAPINE 15 MILLIGRAM(S): 45 TABLET, ORALLY DISINTEGRATING ORAL at 20:38

## 2018-04-25 RX ADMIN — Medication 5 MILLIGRAM(S): at 08:13

## 2018-04-25 RX ADMIN — SODIUM CHLORIDE 75 MILLILITER(S): 9 INJECTION, SOLUTION INTRAVENOUS at 15:32

## 2018-04-25 RX ADMIN — Medication 106 MILLIGRAM(S): at 16:50

## 2018-04-25 RX ADMIN — SENNA PLUS 2 TABLET(S): 8.6 TABLET ORAL at 09:54

## 2018-04-25 RX ADMIN — PANTOPRAZOLE SODIUM 40 MILLIGRAM(S): 20 TABLET, DELAYED RELEASE ORAL at 05:16

## 2018-04-25 RX ADMIN — ENOXAPARIN SODIUM 80 MILLIGRAM(S): 100 INJECTION SUBCUTANEOUS at 18:55

## 2018-04-25 RX ADMIN — OXYCODONE HYDROCHLORIDE 5 MILLIGRAM(S): 5 TABLET ORAL at 21:46

## 2018-04-25 NOTE — PROGRESS NOTE ADULT - ASSESSMENT
82 year old female with history of recently diagnosed unresectable pancreatic cancer who presents with intractable abdominal pain (improving) and poor PO intolerance.    Nausea and poor PO intake- slowly improving  4/23 ERCP with sphincterotomy and metal biliary stent placement; Bilirubin improving  Constipation - likely secondary to narcotics    Plan:  Trend LFTs   Bowel regimen as ordered (pt refusing miralax because does not want any liquid laxatives, but agreeable to tablets)  PO diet as tolerated  AC per primary team  Heme/Onc following, planned chemo today    Joseph Burgos PA-C    [facility]  [phone]

## 2018-04-25 NOTE — PROGRESS NOTE ADULT - NSHPATTENDINGPLANDISCUSS_GEN_ALL_CORE
pt o/p and inpt onco , NP
pt, daughter ,  onco and GI
pt, daughter , NP, 
pt , daughter , GI and called onco
pt, daughter , onco and NP
pt ,daughter at length at bedside . with NP and onco
pt and NP

## 2018-04-25 NOTE — PROVIDER CONTACT NOTE (MEDICATION) - ACTION/TREATMENT ORDERED:
Renea Graza, NP notified. Pt offered liquid fomula for potassium chloride tab and pt refused. Pt educated on importance of medication and pt still refused.

## 2018-04-25 NOTE — PROGRESS NOTE ADULT - SUBJECTIVE AND OBJECTIVE BOX
Chief Complaint:  fu  History of Present Illness:  still no BM, starting pre-meds fro chemo now, nausea better overall, new c/o epigastric abd pain - started today, no vomiting. did have some foos today without nausea. hasnot been taking pain meds or stool softeners/laxatives regularly    MEDICATIONS  (STANDING):  bisacodyl Suppository 10 milliGRAM(s) Rectal once  dexamethasone  IVPB 12 milliGRAM(s) IV Intermittent once  dextrose 5% + sodium chloride 0.45%. 1000 milliLiter(s) (75 mL/Hr) IV Continuous <Continuous>  docusate sodium 100 milliGRAM(s) Oral three times a day  dronabinol 2.5 milliGRAM(s) Oral two times a day  enoxaparin Injectable 80 milliGRAM(s) SubCutaneous two times a day  famotidine  IVPB 20 milliGRAM(s) IV Intermittent once  gemcitabine IVPB 1400 milliGRAM(s) IV Intermittent once  mirtazapine 15 milliGRAM(s) Oral at bedtime  pantoprazole   Suspension 40 milliGRAM(s) Oral before breakfast  potassium chloride    Tablet ER 40 milliEquivalent(s) Oral once  senna 2 Tablet(s) Oral at bedtime  senna        MEDICATIONS  (PRN):  acetaminophen   Tablet. 650 milliGRAM(s) Oral every 8 hours PRN Mild Pain (1 - 3)  ALBUTerol    90 MICROgram(s) HFA Inhaler 2 Puff(s) Inhalation every 6 hours PRN Shortness of Breath and/or Wheezing  oxyCODONE    IR 5 milliGRAM(s) Oral every 4 hours PRN Moderate Pain (4 - 6)  prochlorperazine   Injectable 5 milliGRAM(s) IntraMuscular every 6 hours PRN nausea      Allergies    Levaquin (Other)    Intolerances        Vital Signs Last 24 Hrs  T(C): 36.8 (25 Apr 2018 15:30), Max: 36.8 (25 Apr 2018 15:30)  T(F): 98.2 (25 Apr 2018 15:30), Max: 98.2 (25 Apr 2018 15:30)  HR: 67 (25 Apr 2018 15:30) (65 - 67)  BP: 138/76 (25 Apr 2018 15:30) (121/83 - 142/76)  BP(mean): --  RR: 18 (25 Apr 2018 15:30) (18 - 18)  SpO2: 97% (25 Apr 2018 15:30) (96% - 97%)    PHYSICAL EXAM  General: adult in NAD  HEENT: clear oropharynx, scleral icterus, pink conjunctiva  Neck: supple  CV: normal S1/S2 +murmur  Lungs: clear to auscultation, no wheezes, no rales  Abdomen: soft non-tender non-distended, positive bowel sounds  Ext: no clubbing cyanosis or edema    LABS:                          9.8    7.08  )-----------( 98       ( 25 Apr 2018 07:40 )             29.9         Mean Cell Volume : 97.7 fl  Mean Cell Hemoglobin : 32.0 pg  Mean Cell Hemoglobin Concentration : 32.7 gm/dL  Auto Neutrophil # : x  Auto Lymphocyte # : x  Auto Monocyte # : x  Auto Eosinophil # : x  Auto Basophil # : x  Auto Neutrophil % : x  Auto Lymphocyte % : x  Auto Monocyte % : x  Auto Eosinophil % : x  Auto Basophil % : x      Serial CBC's  04-25 @ 07:40  Hct-29.9 / Hgb-9.8 / Plat-98 / RBC-3.06 / WBC-7.08  Serial CBC's  04-24 @ 07:36  Hct-29.0 / Hgb-9.8 / Plat-95 / RBC-2.99 / WBC-4.7  Serial CBC's  04-23 @ 04:47  Hct-32.0 / Hgb-10.6 / Plat-93 / RBC-3.31 / WBC-4.6  Serial CBC's  04-22 @ 09:48  Hct-31.1 / Hgb-10.1 / Plat-109 / RBC-3.25 / WBC-4.48      04-25    137  |  98  |  7   ----------------------------<  134<H>  3.4<L>   |  27  |  0.92    Ca    9.0      25 Apr 2018 07:16    TPro  6.0  /  Alb  3.2<L>  /  TBili  1.4<H>  /  DBili  x   /  AST  59<H>  /  ALT  75<H>  /  AlkPhos  549<H>  04-25      PTT - ( 24 Apr 2018 20:41 )  PTT:49.8 sec      < from: ERCP (04.23.18 @ 17:38) >  Impression:          - The major papilla appeared normal.                       - A localized biliary stricture was found. The stricture was malignant                        appearing.                       - A sphincterotomy was performed.               - One uncovered metal stent was placed into the common bile duct.  Recommendation:      - Return patient to [facility] for [facility] care.                       - Can restart Heparin                       - Resume prior diet            - AM labs    < end of copied text >

## 2018-04-25 NOTE — PROGRESS NOTE ADULT - SUBJECTIVE AND OBJECTIVE BOX
Post-Anesthetic Evaluation:    The Patient was interviewed and evaluated    Vital Signs Last 24 Hrs  T(C): 36.7 (25 Apr 2018 08:18), Max: 36.7 (24 Apr 2018 15:15)  T(F): 98.1 (25 Apr 2018 08:18), Max: 98.1 (24 Apr 2018 15:15)  HR: 66 (25 Apr 2018 08:18) (65 - 70)  BP: 121/83 (25 Apr 2018 08:18) (108/54 - 142/76)  BP(mean): --  RR: 18 (25 Apr 2018 08:18) (18 - 18)  SpO2: 96% (25 Apr 2018 08:18) (96% - 97%)    Evaluation:      (X)Patient complains of blister on upper lip, potentially from pressure from ETT.    (X) All questions were answered    Condition:  (X) Stable      ( ) Guarded      ( ) Critical    Recommendations:  () None     (X) Other:  Keep lips moisturized/lubricated

## 2018-04-25 NOTE — PROGRESS NOTE ADULT - SUBJECTIVE AND OBJECTIVE BOX
INTERVAL HPI/OVERNIGHT EVENTS:  nausea yesterday   tolerating PO today  no BM yesterday - refused PO laxatives yesterday  planned chemo today    MEDICATIONS  (STANDING):  bisacodyl Suppository 10 milliGRAM(s) Rectal once  dextrose 5% + sodium chloride 0.45%. 1000 milliLiter(s) (75 mL/Hr) IV Continuous <Continuous>  docusate sodium 100 milliGRAM(s) Oral three times a day  dronabinol 2.5 milliGRAM(s) Oral two times a day  enoxaparin Injectable 80 milliGRAM(s) SubCutaneous two times a day  famotidine  IVPB 20 milliGRAM(s) IV Intermittent once  mirtazapine 15 milliGRAM(s) Oral at bedtime  pantoprazole   Suspension 40 milliGRAM(s) Oral before breakfast  potassium chloride    Tablet ER 40 milliEquivalent(s) Oral once  senna 2 Tablet(s) Oral at bedtime  senna        MEDICATIONS  (PRN):  acetaminophen   Tablet. 650 milliGRAM(s) Oral every 8 hours PRN Mild Pain (1 - 3)  ALBUTerol    90 MICROgram(s) HFA Inhaler 2 Puff(s) Inhalation every 6 hours PRN Shortness of Breath and/or Wheezing  oxyCODONE    IR 5 milliGRAM(s) Oral every 4 hours PRN Moderate Pain (4 - 6)  prochlorperazine   Injectable 5 milliGRAM(s) IntraMuscular every 6 hours PRN nausea      Allergies  Levaquin (Other)      Vital Signs Last 24 Hrs  T(C): 36.7 (25 Apr 2018 08:18), Max: 36.7 (24 Apr 2018 15:15)  T(F): 98.1 (25 Apr 2018 08:18), Max: 98.1 (24 Apr 2018 15:15)  HR: 66 (25 Apr 2018 08:18) (65 - 70)  BP: 121/83 (25 Apr 2018 08:18) (108/54 - 142/76)  BP(mean): --  RR: 18 (25 Apr 2018 08:18) (18 - 18)  SpO2: 96% (25 Apr 2018 08:18) (96% - 97%)    PHYSICAL EXAM:  Constitutional: A&OX3, in NAD OOB to chair- examined while eating breakfast  HEENT: NCAT, + decreased scleral icterus, no palpable LAD  Cardiovascular: RRR, S1 and S2 +murmur  Respiratory: CTAB   Gastrointestinal: obese, soft, NTND, normoactive bowel sounds, no palpable HSM  Extremities: No peripheral edema b/l     LABS:                        9.8    7.08  )-----------( 98       ( 25 Apr 2018 07:40 )             29.9     04-25    137  |  98  |  7   ----------------------------<  134<H>  3.4<L>   |  27  |  0.92    Ca    9.0      25 Apr 2018 07:16    TPro  6.0  /  Alb  3.2<L>  /  TBili  1.4<H>  /  DBili  x   /  AST  59<H>  /  ALT  75<H>  /  AlkPhos  549<H>  04-25    PTT - ( 24 Apr 2018 20:41 )  PTT:49.8 sec        RADIOLOGY & ADDITIONAL TESTS:

## 2018-04-25 NOTE — PROGRESS NOTE ADULT - ASSESSMENT
81 y/o female w hx of anal cancer in remission s/p radiation and chemo , HTN,Asthma , recently evaluated for abd pain and found to have panreatic mass with CT done on 3/27 showing 3.4 cm pancreatic head mass with moderaltely distended bladder , no dilatation of bile duct ( at the time ), and ecasemenet of adjacement vasculature , underwent EUS with bx and path consistent with pancreatic Ca per family , shceduled to start chemo at [place] this week however presenting now with abd pain, N/V with no fever .. CT shows dilatation of biliary duct but nl Bili.. no fever or chills..   1- Abd pain /N/v : sec to cancer .. some improvement in Nausea and appetite   .. cont pain meds . cont compazine   consider  celiac plexus  block   2- pancreatic Ca : not likley to be a surgical candidate ..   TB : trending down s/p stent..cpont to monitor     2- pancreatic Ca : d/w :  plan for possible inpt chemo tmmrw if bili continues to trend down       3- DVT of < from: CT Abdomen and Pelvis w/ IV Cont (04.15.18 @ 21:49) >  Main portal vein thrombus. Eccentric thrombus in the distal superior   mesenteric vein.  cont lovenox : will change to  xarelto upon dc since pt and family not interested in lovenox     4- anemia : monitor H/H   5- thrombocytopenia : monitor for now ..  6- constipation : increase miralax ..encouraged pt to take meds 81 y/o female w hx of anal cancer in remission s/p radiation and chemo , HTN,Asthma , recently evaluated for abd pain and found to have panreatic mass with CT done on 3/27 showing 3.4 cm pancreatic head mass with moderaltely distended bladder , no dilatation of bile duct ( at the time ), and ecasemenet of adjacement vasculature , underwent EUS with bx and path consistent with pancreatic Ca per family , shceduled to start chemo at [place] this week however presenting now with abd pain, N/V with no fever .. CT shows dilatation of biliary duct but nl Bili.. no fever or chills..   1- Abd pain /N/v : sec to cancer .. some improvement in Nausea and appetite   .. cont pain meds . cont compazine   consider  celiac plexus  block   2- pancreatic Ca : not likley to be a surgical candidate ..   TB : trending down s/p stent.    2- pancreatic Ca : d/w :  plan for inpt chemo today       3- DVT of < from: CT Abdomen and Pelvis w/ IV Cont (04.15.18 @ 21:49) >  Main portal vein thrombus. Eccentric thrombus in the distal superior   mesenteric vein.  cont lovenox : will change to  xarelto upon dc since pt and family not interested in lovenox     4- anemia : monitor H/H   5- thrombocytopenia : monitor for now ..  6- constipation : cont bowel regimen

## 2018-04-25 NOTE — ADVANCED PRACTICE NURSE CONSULT - ASSESSMENT
pt in bed  A&Ox3 v/s stable afebrile daughter @ bedside Dr hillman in to see pt aware of all labs as documented in [facility]-Ok to [facility] pt today; HT/Wt verified discussed with pt chemotherapy drugs/dosages and possible side effects-drug hand-outs given-verbalized understanding of all education; previously inserted #[id number] in right FA used site without redness no swelling or pain positive brisk blood return noted;  hydration continued @ 75cc/hr as ordered; pre medicated with decadron 12mg ivss and zofran 16mg ivss followed by Gemcitabine 800mg/k3=[id number] 250cc NS infused over 30minutes via pump without incidence-pt tolerated well offers no c/o area nurse given full report

## 2018-04-25 NOTE — PROGRESS NOTE ADULT - ASSESSMENT
The pt is an 82F with hx HTN, asthma, anal cancer s/p radiation/chemo with recent diagnosis of pancreatic cancer, was scheduled to start chemo at [place] 4/18, now presents with nausea/vomiting/abdominal pain.    #Pancreatic cancer- recent diagnosis, was to start chemotherapy gemzar/abraxane at [place] on 4/18; with increased abdominal pain/n/v  - CT with intrahepatic and extrahepatic biliary dilation  - on morphine IV or oral option with oxycodone PRN   - pathology obtained from previous biopsy, placed in chart; Dr. Salcido discussed with previous Oncologist  - GI following- celiac plexus block a possibility depending on course- currently pain controlled with medication  - with biliary obstruction; s/p ERCP with malignant appearing stricture s/p sphincterotomy/stent 4/23- bilirubin improved  - to start chemo today with single agent gemcitabine and will abraxane next week. zofran / dex pre-medication  - cont antiemetics - zofran / compazinePRN    #Anemia, normocytic- likely AOCD; no evidence of bleeding; was on oral iron previously  - iron studies/ b12/folate normal  - Hg remains stable, monitor    #thrombocytopenia- acute illness, thrombosis  - platelets fluctuating,     # 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  - lovenox was changed to heparin gtt prior to procedure; switch to lovenox while inpatient; xarelto on discharge    #constipation  - likely opiate induced - cont aggressive bowel regimen  - will check abd xray    # Epigastric pain -  - will check lipase in AM to rule out pancreatitis      D/W Dr. Augustin  d/w daughter bedside

## 2018-04-25 NOTE — PROGRESS NOTE ADULT - SUBJECTIVE AND OBJECTIVE BOX
Patient is a 82y old  Female who presents with a chief complaint of nausea and vomiting, inability to tolerate PO (2018 14:16)                                                               INTERVAL HPI/OVERNIGHT EVENTS:    REVIEW OF SYSTEMS:     CONSTITUTIONAL: No weakness, fevers or chills  EYES/ENT: No visual changes , no ear ache   NECK: No pain or stiffness  RESPIRATORY: No cough, wheezing,  No shortness of breath  CARDIOVASCULAR: No chest pain or palpitations  GASTROINTESTINAL: No abdominal pain  . No nausea, vomiting, or hematemesis; No diarrhea or constipation. No melena or hematochezia.  GENITOURINARY: No dysuria, frequency or hematuria  NEUROLOGICAL: No numbness or weakness  SKIN: No itching, burning, rashes, or lesions                                                                                                                                                                                                                                                                                 Medications:  MEDICATIONS  (STANDING):  bisacodyl Suppository 10 milliGRAM(s) Rectal once  dextrose 5% + sodium chloride 0.45%. 1000 milliLiter(s) (75 mL/Hr) IV Continuous <Continuous>  docusate sodium 100 milliGRAM(s) Oral three times a day  dronabinol 2.5 milliGRAM(s) Oral two times a day  enoxaparin Injectable 80 milliGRAM(s) SubCutaneous two times a day  famotidine  IVPB 20 milliGRAM(s) IV Intermittent once  gemcitabine IVPB 1400 milliGRAM(s) IV Intermittent once  mirtazapine 15 milliGRAM(s) Oral at bedtime  pantoprazole   Suspension 40 milliGRAM(s) Oral before breakfast  potassium chloride    Tablet ER 40 milliEquivalent(s) Oral once  senna 2 Tablet(s) Oral at bedtime  senna        MEDICATIONS  (PRN):  acetaminophen   Tablet. 650 milliGRAM(s) Oral every 8 hours PRN Mild Pain (1 - 3)  ALBUTerol    90 MICROgram(s) HFA Inhaler 2 Puff(s) Inhalation every 6 hours PRN Shortness of Breath and/or Wheezing  oxyCODONE    IR 5 milliGRAM(s) Oral every 4 hours PRN Moderate Pain (4 - 6)  prochlorperazine   Injectable 5 milliGRAM(s) IntraMuscular every 6 hours PRN nausea       Allergies    Levaquin (Other)    Intolerances      Vital Signs Last 24 Hrs  T(C): 36.9 (2018 21:17), Max: 36.9 (2018 21:17)  T(F): 98.4 (2018 21:17), Max: 98.4 (2018 21:17)  HR: 71 (2018 21:17) (66 - 71)  BP: 138/66 (2018 21:17) (121/83 - 138/76)  BP(mean): --  RR: 18 (2018 21:17) (18 - 18)  SpO2: 95% (2018 21:17) (95% - 97%)  CAPILLARY BLOOD GLUCOSE          - @ 07: @ 07:00  --------------------------------------------------------  IN: 2300 mL / OUT: 0 mL / NET: 2300 mL     @ 07: @ 23:50  --------------------------------------------------------  IN: 1810 mL / OUT: 0 mL / NET: 1810 mL      Physical Exam:    Daily     Daily Weight in k (2018 10:03)  General:  Well appearing, NAD, not cachetic  HEENT:  Nonicteric, PERRLA  CV:  RRR, S1S2   Lungs:  CTA B/L, no wheezes, rales, rhonchi  Abdomen:  Soft, non-tender, no distended, positive BS  Extremities:  2+ pulses, no c/c, no edema  Skin:  Warm and dry, no rashes  :  No metzger  Neuro:  AAOx3, non-focal, grossly intact                                                                                                                                                                                                                                                                                                LABS:                               9.8    7.08  )-----------( 98       ( 2018 07:40 )             29.9                          137  |  98  |  7   ----------------------------<  134<H>  3.4<L>   |  27  |  0.92    Ca    9.0      2018 07:16    TPro  6.0  /  Alb  3.2<L>  /  TBili  1.4<H>  /  DBili  x   /  AST  59<H>  /  ALT  75<H>  /  AlkPhos  549<H>                         RADIOLOGY & ADDITIONAL TESTS         I personally reviewed: [  ]EKG   [  ]CXR    [  ] CT      A/P:         Discussed with :     Amanda consultants' Notes   Time spent : Patient is a 82y old  Female who presents with a chief complaint of nausea and vomiting, inability to tolerate PO (2018 14:16)                                                               INTERVAL HPI/OVERNIGHT EVENTS:    REVIEW OF SYSTEMS:     CONSTITUTIONAL: No weakness, fevers or chills  RESPIRATORY: No cough, wheezing,  No shortness of breath  CARDIOVASCULAR: No chest pain or palpitations  GASTROINTESTINAL: No abdominal pain  .  improving nausea, no  vomiting, improving Po intake   GENITOURINARY: No dysuria, frequency or hematuria  NEUROLOGICAL: No numbness or weakness                                                                                                                                                                                                                                                                                Medications:  MEDICATIONS  (STANDING):  bisacodyl Suppository 10 milliGRAM(s) Rectal once  dextrose 5% + sodium chloride 0.45%. 1000 milliLiter(s) (75 mL/Hr) IV Continuous <Continuous>  docusate sodium 100 milliGRAM(s) Oral three times a day  dronabinol 2.5 milliGRAM(s) Oral two times a day  enoxaparin Injectable 80 milliGRAM(s) SubCutaneous two times a day  famotidine  IVPB 20 milliGRAM(s) IV Intermittent once  gemcitabine IVPB 1400 milliGRAM(s) IV Intermittent once  mirtazapine 15 milliGRAM(s) Oral at bedtime  pantoprazole   Suspension 40 milliGRAM(s) Oral before breakfast  potassium chloride    Tablet ER 40 milliEquivalent(s) Oral once  senna 2 Tablet(s) Oral at bedtime  senna        MEDICATIONS  (PRN):  acetaminophen   Tablet. 650 milliGRAM(s) Oral every 8 hours PRN Mild Pain (1 - 3)  ALBUTerol    90 MICROgram(s) HFA Inhaler 2 Puff(s) Inhalation every 6 hours PRN Shortness of Breath and/or Wheezing  oxyCODONE    IR 5 milliGRAM(s) Oral every 4 hours PRN Moderate Pain (4 - 6)  prochlorperazine   Injectable 5 milliGRAM(s) IntraMuscular every 6 hours PRN nausea       Allergies    Levaquin (Other)    Intolerances      Vital Signs Last 24 Hrs  T(C): 36.9 (2018 21:17), Max: 36.9 (2018 21:17)  T(F): 98.4 (2018 21:17), Max: 98.4 (2018 21:17)  HR: 71 (2018 21:17) (66 - 71)  BP: 138/66 (2018 21:17) (121/83 - 138/76)  BP(mean): --  RR: 18 (2018 21:17) (18 - 18)  SpO2: 95% (2018 21:17) (95% - 97%)  CAPILLARY BLOOD GLUCOSE           @ 07: @ 07:00  --------------------------------------------------------  IN: 2300 mL / OUT: 0 mL / NET: 2300 mL     @ 07: @ 23:50  --------------------------------------------------------  IN: 1810 mL / OUT: 0 mL / NET: 1810 mL      Physical Exam:     Daily Weight in k (2018 10:03)  General:NAD  HEENT:  Nonicteric, PERRLA  CV:  RRR, S1S2   Lungs:  CTA B  Abdomen:  Soft, mild epigastric tenderness   Extremities:  2+ pulses, no c/c, no edema  Skin:  Warm and dry, no rashes  :  No metzger  Neuro:  AAOx3, non-focal, grossly intact                                                                                                                                                                                                                                                                                                LABS:                               9.8    7.08  )-----------( 98       ( 2018 07:40 )             29.9                          137  |  98  |  7   ----------------------------<  134<H>  3.4<L>   |  27  |  0.92    Ca    9.0      2018 07:16    TPro  6.0  /  Alb  3.2<L>  /  TBili  1.4<H>  /  DBili  x   /  AST  59<H>  /  ALT  75<H>  /  AlkPhos  549<H>

## 2018-04-26 LAB
ALBUMIN SERPL ELPH-MCNC: 3.1 G/DL — LOW (ref 3.3–5)
ALP SERPL-CCNC: 427 U/L — HIGH (ref 40–120)
ALT FLD-CCNC: 53 U/L — HIGH (ref 10–45)
ANION GAP SERPL CALC-SCNC: 12 MMOL/L — SIGNIFICANT CHANGE UP (ref 5–17)
AST SERPL-CCNC: 26 U/L — SIGNIFICANT CHANGE UP (ref 10–40)
BILIRUB SERPL-MCNC: 1.1 MG/DL — SIGNIFICANT CHANGE UP (ref 0.2–1.2)
BUN SERPL-MCNC: 8 MG/DL — SIGNIFICANT CHANGE UP (ref 7–23)
CALCIUM SERPL-MCNC: 9 MG/DL — SIGNIFICANT CHANGE UP (ref 8.4–10.5)
CHLORIDE SERPL-SCNC: 99 MMOL/L — SIGNIFICANT CHANGE UP (ref 96–108)
CO2 SERPL-SCNC: 25 MMOL/L — SIGNIFICANT CHANGE UP (ref 22–31)
CREAT SERPL-MCNC: 0.88 MG/DL — SIGNIFICANT CHANGE UP (ref 0.5–1.3)
GLUCOSE SERPL-MCNC: 172 MG/DL — HIGH (ref 70–99)
HCT VFR BLD CALC: 27.6 % — LOW (ref 34.5–45)
HGB BLD-MCNC: 9 G/DL — LOW (ref 11.5–15.5)
LIDOCAIN IGE QN: 44 U/L — SIGNIFICANT CHANGE UP (ref 7–60)
MCHC RBC-ENTMCNC: 30.9 PG — SIGNIFICANT CHANGE UP (ref 27–34)
MCHC RBC-ENTMCNC: 32.6 GM/DL — SIGNIFICANT CHANGE UP (ref 32–36)
MCV RBC AUTO: 94.8 FL — SIGNIFICANT CHANGE UP (ref 80–100)
PLATELET # BLD AUTO: 98 K/UL — LOW (ref 150–400)
POTASSIUM SERPL-MCNC: 3.7 MMOL/L — SIGNIFICANT CHANGE UP (ref 3.5–5.3)
POTASSIUM SERPL-SCNC: 3.7 MMOL/L — SIGNIFICANT CHANGE UP (ref 3.5–5.3)
PROT SERPL-MCNC: 5.9 G/DL — LOW (ref 6–8.3)
RBC # BLD: 2.91 M/UL — LOW (ref 3.8–5.2)
RBC # FLD: 16.6 % — HIGH (ref 10.3–14.5)
SODIUM SERPL-SCNC: 136 MMOL/L — SIGNIFICANT CHANGE UP (ref 135–145)
WBC # BLD: 5.39 K/UL — SIGNIFICANT CHANGE UP (ref 3.8–10.5)
WBC # FLD AUTO: 5.39 K/UL — SIGNIFICANT CHANGE UP (ref 3.8–10.5)

## 2018-04-26 RX ORDER — ACETAMINOPHEN 500 MG
2 TABLET ORAL
Qty: 0 | Refills: 0 | COMMUNITY
Start: 2018-04-26

## 2018-04-26 RX ORDER — FERROUS SULFATE 325(65) MG
0 TABLET ORAL
Qty: 0 | Refills: 0 | COMMUNITY

## 2018-04-26 RX ORDER — LEVOTHYROXINE SODIUM 125 MCG
1 TABLET ORAL
Qty: 0 | Refills: 0 | COMMUNITY

## 2018-04-26 RX ORDER — DRONABINOL 2.5 MG
1 CAPSULE ORAL
Qty: 14 | Refills: 0 | OUTPATIENT
Start: 2018-04-26 | End: 2018-05-02

## 2018-04-26 RX ORDER — LACTULOSE 10 G/15ML
15 SOLUTION ORAL
Qty: 0 | Refills: 0 | Status: DISCONTINUED | OUTPATIENT
Start: 2018-04-26 | End: 2018-04-27

## 2018-04-26 RX ORDER — FONDAPARINUX SODIUM 2.5 MG/.5ML
1 INJECTION, SOLUTION SUBCUTANEOUS
Qty: 42 | Refills: 0 | OUTPATIENT
Start: 2018-04-26 | End: 2018-05-16

## 2018-04-26 RX ADMIN — OXYCODONE HYDROCHLORIDE 5 MILLIGRAM(S): 5 TABLET ORAL at 21:00

## 2018-04-26 RX ADMIN — Medication 1 ENEMA: at 14:00

## 2018-04-26 RX ADMIN — LACTULOSE 15 GRAM(S): 10 SOLUTION ORAL at 18:08

## 2018-04-26 RX ADMIN — SODIUM CHLORIDE 75 MILLILITER(S): 9 INJECTION, SOLUTION INTRAVENOUS at 05:19

## 2018-04-26 RX ADMIN — Medication 2.5 MILLIGRAM(S): at 05:18

## 2018-04-26 RX ADMIN — Medication 2.5 MILLIGRAM(S): at 18:05

## 2018-04-26 RX ADMIN — ENOXAPARIN SODIUM 80 MILLIGRAM(S): 100 INJECTION SUBCUTANEOUS at 18:10

## 2018-04-26 RX ADMIN — OXYCODONE HYDROCHLORIDE 5 MILLIGRAM(S): 5 TABLET ORAL at 20:05

## 2018-04-26 RX ADMIN — Medication 100 MILLIGRAM(S): at 05:18

## 2018-04-26 RX ADMIN — Medication 100 MILLIGRAM(S): at 18:05

## 2018-04-26 RX ADMIN — ENOXAPARIN SODIUM 80 MILLIGRAM(S): 100 INJECTION SUBCUTANEOUS at 05:18

## 2018-04-26 RX ADMIN — PANTOPRAZOLE SODIUM 40 MILLIGRAM(S): 20 TABLET, DELAYED RELEASE ORAL at 05:18

## 2018-04-26 NOTE — PROGRESS NOTE ADULT - ASSESSMENT
82 year old female with history of recently diagnosed unresectable pancreatic cancer who presents with intractable abdominal pain (improving) and poor PO intolerance.    Nausea and poor PO intake- slowly improving  4/23 ERCP with sphincterotomy and metal biliary stent placement; Bilirubin improving  Constipation - likely secondary to narcotics    Plan:  Bowel regimen as ordered (pt refusing any liquid laxatives, but agreeable to tablets)  Follow  up official AXR report  PO diet as tolerated  AC per primary team  Heme/Onc following  No GI objection to discharge home with bowel regimen, can follow up in office with Dr Parminder Burgos PA-C    [facility]  [phone] 82 year old female with history of recently diagnosed unresectable pancreatic cancer who presents with intractable abdominal pain (improving) and poor PO intolerance.    Nausea and poor PO intake- slowly improving  4/23 ERCP with sphincterotomy and metal biliary stent placement; Bilirubin improving  Constipation - likely secondary to narcotics    Rec-  Bowel regimen as ordered (pt refusing any liquid laxatives, but agreeable to tablets)  Follow  up official AXR report  PO diet as tolerated  AC per primary team  Heme/Onc following  No GI objection to discharge home with bowel regimen, can follow up in office with Dr Parminder Burgos PA-C    [facility]  [phone]

## 2018-04-26 NOTE — PROGRESS NOTE ADULT - ASSESSMENT
The pt is an 82F with hx HTN, asthma, anal cancer s/p radiation/chemo with recent diagnosis of pancreatic cancer, was scheduled to start chemo at [place] 4/18, now presents with nausea/vomiting/abdominal pain.    #Pancreatic cancer- recent diagnosis, was to start chemotherapy gemzar/abraxane at [place] on 4/18; with increased abdominal pain/n/v  - CT with intrahepatic and extrahepatic biliary dilation  - on morphine IV or oral option with oxycodone PRN   - pathology obtained from previous biopsy, placed in chart; I discussed with previous Oncologist  - GI following- celiac plexus block not needed at this point - currently pain controlled with medication  - with biliary obstruction; s/p ERCP with malignant appearing stricture s/p sphincterotomy/stent 4/23- bilirubin improved  - had first dose of chemo yesterday 4/25 with single agent gemcitabine at 800 mg / m2 and will add abraxane next week. 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    #constipation  - likely opiate induced - cont aggressive bowel regimen  - nonobstructive bowel gas pattern on abd xray 4/25  - enema ordered today    # Epigastric pain -  - resolved, Lipase today normal - no evidence of post procedure pancreatitis    Would DC today or tmrw  FU with me next week in office for FU and chemo  D/W Dr. Augustin

## 2018-04-26 NOTE — PROGRESS NOTE ADULT - SUBJECTIVE AND OBJECTIVE BOX
Chief Complaint:  fu    History of Present Illness:  still no BM, tolerated chemo well yesterday nausea and pain better overall, new c/o epigastric abd pain - has resolved, no vomiting. did have some food yesterday without nausea. has not been taking pain meds or stool softeners/laxatives regularly      MEDICATIONS  (STANDING):  bisacodyl Suppository 10 milliGRAM(s) Rectal once  dextrose 5% + sodium chloride 0.45%. 1000 milliLiter(s) (75 mL/Hr) IV Continuous <Continuous>  docusate sodium 100 milliGRAM(s) Oral three times a day  dronabinol 2.5 milliGRAM(s) Oral two times a day  enoxaparin Injectable 80 milliGRAM(s) SubCutaneous two times a day  famotidine  IVPB 20 milliGRAM(s) IV Intermittent once  gemcitabine IVPB 1400 milliGRAM(s) IV Intermittent once  mirtazapine 15 milliGRAM(s) Oral at bedtime  pantoprazole   Suspension 40 milliGRAM(s) Oral before breakfast  potassium chloride    Tablet ER 40 milliEquivalent(s) Oral once  senna 2 Tablet(s) Oral at bedtime  senna      sodium biphosphate Rectal Enema 1 Enema Rectal once    MEDICATIONS  (PRN):  acetaminophen   Tablet. 650 milliGRAM(s) Oral every 8 hours PRN Mild Pain (1 - 3)  ALBUTerol    90 MICROgram(s) HFA Inhaler 2 Puff(s) Inhalation every 6 hours PRN Shortness of Breath and/or Wheezing  oxyCODONE    IR 5 milliGRAM(s) Oral every 4 hours PRN Moderate Pain (4 - 6)  prochlorperazine   Injectable 5 milliGRAM(s) IntraMuscular every 6 hours PRN nausea      Allergies    Levaquin (Other)    Intolerances        Vital Signs Last 24 Hrs  T(C): 36.9 (26 Apr 2018 07:32), Max: 36.9 (25 Apr 2018 21:17)  T(F): 98.4 (26 Apr 2018 07:32), Max: 98.4 (25 Apr 2018 21:17)  HR: 70 (26 Apr 2018 07:32) (67 - 73)  BP: 113/69 (26 Apr 2018 07:32) (113/69 - 138/76)  BP(mean): --  RR: 18 (26 Apr 2018 07:32) (18 - 18)  SpO2: 95% (26 Apr 2018 07:32) (95% - 97%)    PHYSICAL EXAM  General: adult in NAD, sitting in chair  HEENT: clear oropharynx, scleral icterus, pink conjunctiva  Neck: supple  CV: normal S1/S2 +murmur  Lungs: clear to auscultation, no wheezes, no rales  Abdomen: soft non-tender non-distended, positive bowel sounds  Ext: no clubbing cyanosis or edema    LABS:                          9.0    5.39  )-----------( 98       ( 26 Apr 2018 10:09 )             27.6         Mean Cell Volume : 94.8 fl  Mean Cell Hemoglobin : 30.9 pg  Mean Cell Hemoglobin Concentration : 32.6 gm/dL  Auto Neutrophil # : x  Auto Lymphocyte # : x  Auto Monocyte # : x  Auto Eosinophil # : x  Auto Basophil # : x  Auto Neutrophil % : x  Auto Lymphocyte % : x  Auto Monocyte % : x  Auto Eosinophil % : x  Auto Basophil % : x      Serial CBC's  04-26 @ 10:09  Hct-27.6 / Hgb-9.0 / Plat-98 / RBC-2.91 / WBC-5.39  Serial CBC's  04-25 @ 07:40  Hct-29.9 / Hgb-9.8 / Plat-98 / RBC-3.06 / WBC-7.08  Serial CBC's  04-24 @ 07:36  Hct-29.0 / Hgb-9.8 / Plat-95 / RBC-2.99 / WBC-4.7  Serial CBC's  04-23 @ 04:47  Hct-32.0 / Hgb-10.6 / Plat-93 / RBC-3.31 / WBC-4.6      04-26    136  |  99  |  8   ----------------------------<  172<H>  3.7   |  25  |  0.88    Ca    9.0      26 Apr 2018 07:12    TPro  5.9<L>  /  Alb  3.1<L>  /  TBili  1.1  /  DBili  x   /  AST  26  /  ALT  53<H>  /  AlkPhos  427<H>  04-26      PTT - ( 24 Apr 2018 20:41 )  PTT:49.8 sec

## 2018-04-26 NOTE — PROGRESS NOTE ADULT - SUBJECTIVE AND OBJECTIVE BOX
examined at 5 am    INTERVAL HPI/OVERNIGHT EVENTS:  s/p chemo yesterday  no BM yesterday  no nausea or vomiting  had 2 doses of senna yesterday    MEDICATIONS  (STANDING):  bisacodyl Suppository 10 milliGRAM(s) Rectal once  dextrose 5% + sodium chloride 0.45%. 1000 milliLiter(s) (75 mL/Hr) IV Continuous <Continuous>  docusate sodium 100 milliGRAM(s) Oral three times a day  dronabinol 2.5 milliGRAM(s) Oral two times a day  enoxaparin Injectable 80 milliGRAM(s) SubCutaneous two times a day  famotidine  IVPB 20 milliGRAM(s) IV Intermittent once  gemcitabine IVPB 1400 milliGRAM(s) IV Intermittent once  mirtazapine 15 milliGRAM(s) Oral at bedtime  pantoprazole   Suspension 40 milliGRAM(s) Oral before breakfast  potassium chloride    Tablet ER 40 milliEquivalent(s) Oral once  senna 2 Tablet(s) Oral at bedtime  senna        MEDICATIONS  (PRN):  acetaminophen   Tablet. 650 milliGRAM(s) Oral every 8 hours PRN Mild Pain (1 - 3)  ALBUTerol    90 MICROgram(s) HFA Inhaler 2 Puff(s) Inhalation every 6 hours PRN Shortness of Breath and/or Wheezing  oxyCODONE    IR 5 milliGRAM(s) Oral every 4 hours PRN Moderate Pain (4 - 6)  prochlorperazine   Injectable 5 milliGRAM(s) IntraMuscular every 6 hours PRN nausea      Allergies  Levaquin (Other)      Vital Signs Last 24 Hrs  T(C): 36.9 (26 Apr 2018 07:32), Max: 36.9 (25 Apr 2018 21:17)  T(F): 98.4 (26 Apr 2018 07:32), Max: 98.4 (25 Apr 2018 21:17)  HR: 70 (26 Apr 2018 07:32) (67 - 73)  BP: 113/69 (26 Apr 2018 07:32) (113/69 - 138/76)  BP(mean): --  RR: 18 (26 Apr 2018 07:32) (18 - 18)  SpO2: 95% (26 Apr 2018 07:32) (95% - 97%)    PHYSICAL EXAM:  Constitutional: A&OX3, in NAD lying comfortably in bed  HEENT: NCAT, + decreased scleral icterus, no palpable LAD  Cardiovascular: RRR, S1 and S2 +murmur  Respiratory: CTAB   Gastrointestinal: obese, soft, NTND, normoactive bowel sounds, no palpable HSM  Extremities: No peripheral edema b/l     LABS:                        9.8    7.08  )-----------( 98       ( 25 Apr 2018 07:40 )             29.9     04-26    136  |  99  |  8   ----------------------------<  172<H>  3.7   |  25  |  0.88    Ca    9.0      26 Apr 2018 07:12    TPro  5.9<L>  /  Alb  3.1<L>  /  TBili  1.1  /  DBili  x   /  AST  26  /  ALT  53<H>  /  AlkPhos  427<H>  04-26    PTT - ( 24 Apr 2018 20:41 )  PTT:49.8 sec         RADIOLOGY & ADDITIONAL TESTS:  AXR 4/25 - report pending, image viewed by me   CHINYEREP; no large fecal burden, no fecal impaction/large rectal stool burden Examined at 5 am    INTERVAL HPI/OVERNIGHT EVENTS:  s/p chemo yesterday  no BM yesterday  no nausea or vomiting  had 2 doses of senna yesterday    MEDICATIONS  (STANDING):  bisacodyl Suppository 10 milliGRAM(s) Rectal once  dextrose 5% + sodium chloride 0.45%. 1000 milliLiter(s) (75 mL/Hr) IV Continuous <Continuous>  docusate sodium 100 milliGRAM(s) Oral three times a day  dronabinol 2.5 milliGRAM(s) Oral two times a day  enoxaparin Injectable 80 milliGRAM(s) SubCutaneous two times a day  famotidine  IVPB 20 milliGRAM(s) IV Intermittent once  gemcitabine IVPB 1400 milliGRAM(s) IV Intermittent once  mirtazapine 15 milliGRAM(s) Oral at bedtime  pantoprazole   Suspension 40 milliGRAM(s) Oral before breakfast  potassium chloride    Tablet ER 40 milliEquivalent(s) Oral once  senna 2 Tablet(s) Oral at bedtime  senna        MEDICATIONS  (PRN):  acetaminophen   Tablet. 650 milliGRAM(s) Oral every 8 hours PRN Mild Pain (1 - 3)  ALBUTerol    90 MICROgram(s) HFA Inhaler 2 Puff(s) Inhalation every 6 hours PRN Shortness of Breath and/or Wheezing  oxyCODONE    IR 5 milliGRAM(s) Oral every 4 hours PRN Moderate Pain (4 - 6)  prochlorperazine   Injectable 5 milliGRAM(s) IntraMuscular every 6 hours PRN nausea    Allergies  Levaquin (Other)    Vital Signs Last 24 Hrs  T(C): 36.9 (26 Apr 2018 07:32), Max: 36.9 (25 Apr 2018 21:17)  T(F): 98.4 (26 Apr 2018 07:32), Max: 98.4 (25 Apr 2018 21:17)  HR: 70 (26 Apr 2018 07:32) (67 - 73)  BP: 113/69 (26 Apr 2018 07:32) (113/69 - 138/76)  BP(mean): --  RR: 18 (26 Apr 2018 07:32) (18 - 18)  SpO2: 95% (26 Apr 2018 07:32) (95% - 97%)    PHYSICAL EXAM:  Constitutional: A&OX3, in NAD lying comfortably in bed  HEENT: NCAT, + decreased scleral icterus, no palpable LAD  Cardiovascular: RRR, S1 and S2 +murmur  Respiratory: CTAB   Gastrointestinal: obese, soft, NTND, normoactive bowel sounds, no palpable HSM  Extremities: No peripheral edema b/l     LABS:                      9.8    7.08  )-----------( 98       ( 25 Apr 2018 07:40 )             29.9     04-26    136  |  99  |  8   ----------------------------<  172<H>  3.7   |  25  |  0.88    Ca    9.0      26 Apr 2018 07:12    TPro  5.9<L>  /  Alb  3.1<L>  /  TBili  1.1  /  DBili  x   /  AST  26  /  ALT  53<H>  /  AlkPhos  427<H>  04-26    PTT - ( 24 Apr 2018 20:41 )  PTT:49.8 sec     RADIOLOGY & ADDITIONAL TESTS:  AXR 4/25 - report pending, image viewed by me   CHINYEREP; no large fecal burden, no fecal impaction/large rectal stool burden

## 2018-04-26 NOTE — CHART NOTE - NSCHARTNOTEFT_GEN_A_CORE
S= constpation with obstipation  HPI:  Angeline Boles is an 82 year old female with a history of hypertension, asthma, anal cancer s/p radiation and chemotherapy, recently diagnosed pancreatic cancer presents for evaluation of persistent nausea and vomiting.    Patient reports that she was recently diagnosed with pancreatic cancer while in [place] when she had a CT scan for GI-related symptoms. She returned to [place] to have treatment was planning to continue her evaluation at [facility] this upcoming Wednesday. She reports that over the past three days, she has been having episodes of abdominal pain, and nausea/emesis with attempted PO intake. Reports that the pain is 9/10 and usually occurs at night when she is laying in bed. Unable to get comfortable. Pain is in the middle of her stomach and does not radiate, is not associated with any other symptoms. Additionally during this time, she has been unable to tolerate anything by mouth including medications. With water, food, or pills she immediately has emesis following ingestion. ROS otherwise positive for constipation, last bowel movement on Thursday. Denies any fevers, chills, headache, chest pain, shortness of breath, dysuria or increased frequency.     In the ED, T 98.2, HR 73, /72, O2 97% on RA. Labs showed no leukocytosis with WBC 7.9, hemoglobin 9.2 (at baseline), and platelets 132. Metabolic panel unremarkable with Cr 1.09. LFTs with , AST 83, ALT 74 with normal Tbili (and downtrending on repeat labs). Mag and lipase normal. VBG unremarkable with lactate 1.7. UA negative. Patient had a ED US that showed a gallbladder with sludge and cholelithiasis. CT abdomen/pelvis showed no evidence of SBO, but did show a portal vein thrombosis as well as a clot in the SMV. Patient was given 3L NS, 1g Tylenol, and 4mg Zofran IV. Started on a heparin gtt for new thrombosis after ED discussed with Dr. Augustin. (15 Apr 2018 23:08)  Pe: awake and alert no comaplints                        9.0    5.39  )-----------( 98       ( 26 Apr 2018 10:09 )             27.6   Comprehensive Metabolic Panel in AM (04.26.18 @ 07:12)    Sodium, Serum: 136 mmol/L    Potassium, Serum: 3.7 mmol/L    Chloride, Serum: 99 mmol/L    Carbon Dioxide, Serum: 25 mmol/L    Anion Gap, Serum: 12 mmol/L    Blood Urea Nitrogen, Serum: 8 mg/dL    Creatinine, Serum: 0.88 mg/dL    Glucose, Serum: 172 mg/dL    Calcium, Total Serum: 9.0 mg/dL    Protein Total, Serum: 5.9 g/dL    Albumin, Serum: 3.1 g/dL    Bilirubin Total, Serum: 1.1 mg/dL    Alkaline Phosphatase, Serum: 427 U/L    Aspartate Aminotransferase (AST/SGOT): 26 U/L    Alanine Aminotransferase (ALT/SGPT): 53 U/L   MEDICATIONS  (STANDING):  bisacodyl 5 milliGRAM(s) Oral at bedtime  bisacodyl Suppository 10 milliGRAM(s) Rectal once  dextrose 5% + sodium chloride 0.45%. 1000 milliLiter(s) (75 mL/Hr) IV Continuous <Continuous>  docusate sodium 100 milliGRAM(s) Oral three times a day  dronabinol 2.5 milliGRAM(s) Oral two times a day  enoxaparin Injectable 80 milliGRAM(s) SubCutaneous two times a day  famotidine  IVPB 20 milliGRAM(s) IV Intermittent once  gemcitabine IVPB 1400 milliGRAM(s) IV Intermittent once  lactulose Syrup 15 Gram(s) Oral two times a day  mirtazapine 15 milliGRAM(s) Oral at bedtime  pantoprazole   Suspension 40 milliGRAM(s) Oral before breakfast  potassium chloride    Tablet ER 40 milliEquivalent(s) Oral once  senna 2 Tablet(s) Oral at bedtime  senna      A/P 82 yrs old female with anal cancer, now with constipation and no results after aggressive bowel regimen , will give lactulose if no results get ct scan . d/w dr Augustin

## 2018-04-26 NOTE — PROGRESS NOTE ADULT - ASSESSMENT
83 y/o female w hx of anal cancer in remission s/p radiation and chemo , HTN,Asthma , recently evaluated for abd pain and found to have panreatic mass with CT done on 3/27 showing 3.4 cm pancreatic head mass with moderaltely distended bladder , no dilatation of bile duct ( at the time ), and ecasemenet of adjacement vasculature , underwent EUS with bx and path consistent with pancreatic Ca per family , shceduled to start chemo at [facility] this week however presenting now with abd pain, N/V with no fever .. CT shows dilatation of biliary duct but nl Bili.. no fever or chills..   1- Abd pain /N/v : sec to cancer .. improvement in Nausea and some improvement with appetite  appetite   .. cont pain meds . cont compazine   consider  celiac plexus  block   2- pancreatic Ca : not likley to be a surgical candidate ..   TB : trending down s/p stent.    2- pancreatic Ca : d/w :  plan for inpt chemo today     3- DVT of < from: CT Abdomen and Pelvis w/ IV Cont (04.15.18 @ 21:49) >  Main portal vein thrombus. Eccentric thrombus in the distal superior   mesenteric vein.  cont lovenox : will change to  xarelto upon dc since pt and family not interested in lovenox     4- anemia : monitor H/H   5- thrombocytopenia : monitor for now ..  6- constipation : still with constiaption ..was resistent to taking meds and intermittently non compliant.. now agreeable   cont bowel regimen including enema and lactulose

## 2018-04-26 NOTE — PROGRESS NOTE ADULT - SUBJECTIVE AND OBJECTIVE BOX
Patient is a 82y old  Female who presents with a chief complaint of nausea and vomiting, inability to tolerate PO (16 Apr 2018 14:16)                                                               INTERVAL HPI/OVERNIGHT EVENTS:    REVIEW OF SYSTEMS:     CONSTITUTIONAL: No weakness, fevers or chills  RESPIRATORY: No cough, wheezing,  No shortness of breath  CARDIOVASCULAR: No chest pain or palpitations  GASTROINTESTINAL: No abdominal pain  . imrpvoed   nausea however PO intake still not ideal   still with constipation   GENITOURINARY: No dysuria, frequency   NEUROLOGICAL: No numbness or weakness                                                                                                                                                                                                                                                                                Medications:  MEDICATIONS  (STANDING):  bisacodyl 5 milliGRAM(s) Oral at bedtime  bisacodyl Suppository 10 milliGRAM(s) Rectal once  dextrose 5% + sodium chloride 0.45%. 1000 milliLiter(s) (75 mL/Hr) IV Continuous <Continuous>  docusate sodium 100 milliGRAM(s) Oral three times a day  dronabinol 2.5 milliGRAM(s) Oral two times a day  enoxaparin Injectable 80 milliGRAM(s) SubCutaneous two times a day  famotidine  IVPB 20 milliGRAM(s) IV Intermittent once  gemcitabine IVPB 1400 milliGRAM(s) IV Intermittent once  lactulose Syrup 15 Gram(s) Oral two times a day  mirtazapine 15 milliGRAM(s) Oral at bedtime  pantoprazole   Suspension 40 milliGRAM(s) Oral before breakfast  potassium chloride    Tablet ER 40 milliEquivalent(s) Oral once  senna 2 Tablet(s) Oral at bedtime  senna        MEDICATIONS  (PRN):  acetaminophen   Tablet. 650 milliGRAM(s) Oral every 8 hours PRN Mild Pain (1 - 3)  ALBUTerol    90 MICROgram(s) HFA Inhaler 2 Puff(s) Inhalation every 6 hours PRN Shortness of Breath and/or Wheezing  oxyCODONE    IR 5 milliGRAM(s) Oral every 4 hours PRN Moderate Pain (4 - 6)  prochlorperazine   Injectable 5 milliGRAM(s) IntraMuscular every 6 hours PRN nausea       Allergies    Levaquin (Other)    Intolerances      Vital Signs Last 24 Hrs  T(C): 36.7 (26 Apr 2018 14:37), Max: 36.9 (25 Apr 2018 21:17)  T(F): 98.1 (26 Apr 2018 14:37), Max: 98.4 (25 Apr 2018 21:17)  HR: 68 (26 Apr 2018 14:37) (68 - 73)  BP: 119/76 (26 Apr 2018 14:37) (113/69 - 138/66)  BP(mean): --  RR: 18 (26 Apr 2018 14:37) (18 - 18)  SpO2: 96% (26 Apr 2018 14:37) (95% - 96%)  CAPILLARY BLOOD GLUCOSE          04-25 @ 07:01 - 04-26 @ 07:00  --------------------------------------------------------  IN: 2950 mL / OUT: 0 mL / NET: 2950 mL    04-26 @ 07:01 - 04-26 @ 19:51  --------------------------------------------------------  IN: 1380 mL / OUT: 0 mL / NET: 1380 mL      Physical Exam:    General:  NAD   HEENT:  Nonicteric, PERRLA  CV:  RRR, S1S2   Lungs:  CTAB  Abdomen:  Soft, non-tender  Extremities:  2+ pulses, no c/c, no edema  Skin:  Warm and dry, no rashes  :  No metzger  Neuro:  AAOx3, non-focal, grossly intact                                                                                                                                                                                                                                                                                                LABS:                               9.0    5.39  )-----------( 98       ( 26 Apr 2018 10:09 )             27.6                      04-26    136  |  99  |  8   ----------------------------<  172<H>  3.7   |  25  |  0.88    Ca    9.0      26 Apr 2018 07:12    TPro  5.9<L>  /  Alb  3.1<L>  /  TBili  1.1  /  DBili  x   /  AST  26  /  ALT  53<H>  /  AlkPhos  427<H>  04-26

## 2018-04-27 VITALS
DIASTOLIC BLOOD PRESSURE: 72 MMHG | OXYGEN SATURATION: 94 % | RESPIRATION RATE: 18 BRPM | TEMPERATURE: 99 F | SYSTOLIC BLOOD PRESSURE: 151 MMHG | HEART RATE: 64 BPM

## 2018-04-27 LAB
ALBUMIN SERPL ELPH-MCNC: 3 G/DL — LOW (ref 3.3–5)
ALP SERPL-CCNC: 355 U/L — HIGH (ref 40–120)
ALT FLD-CCNC: 43 U/L — SIGNIFICANT CHANGE UP (ref 10–45)
ANION GAP SERPL CALC-SCNC: 12 MMOL/L — SIGNIFICANT CHANGE UP (ref 5–17)
ANION GAP SERPL CALC-SCNC: 9 MMOL/L — SIGNIFICANT CHANGE UP (ref 5–17)
AST SERPL-CCNC: 25 U/L — SIGNIFICANT CHANGE UP (ref 10–40)
BILIRUB SERPL-MCNC: 1.3 MG/DL — HIGH (ref 0.2–1.2)
BUN SERPL-MCNC: 9 MG/DL — SIGNIFICANT CHANGE UP (ref 7–23)
BUN SERPL-MCNC: 9 MG/DL — SIGNIFICANT CHANGE UP (ref 7–23)
CALCIUM SERPL-MCNC: 8.5 MG/DL — SIGNIFICANT CHANGE UP (ref 8.4–10.5)
CALCIUM SERPL-MCNC: 8.7 MG/DL — SIGNIFICANT CHANGE UP (ref 8.4–10.5)
CHLORIDE SERPL-SCNC: 98 MMOL/L — SIGNIFICANT CHANGE UP (ref 96–108)
CHLORIDE SERPL-SCNC: 99 MMOL/L — SIGNIFICANT CHANGE UP (ref 96–108)
CO2 SERPL-SCNC: 26 MMOL/L — SIGNIFICANT CHANGE UP (ref 22–31)
CO2 SERPL-SCNC: 28 MMOL/L — SIGNIFICANT CHANGE UP (ref 22–31)
CREAT SERPL-MCNC: 0.82 MG/DL — SIGNIFICANT CHANGE UP (ref 0.5–1.3)
CREAT SERPL-MCNC: 0.84 MG/DL — SIGNIFICANT CHANGE UP (ref 0.5–1.3)
GLUCOSE SERPL-MCNC: 131 MG/DL — HIGH (ref 70–99)
GLUCOSE SERPL-MCNC: 134 MG/DL — HIGH (ref 70–99)
HCT VFR BLD CALC: 29.4 % — LOW (ref 34.5–45)
HGB BLD-MCNC: 9.5 G/DL — LOW (ref 11.5–15.5)
MCHC RBC-ENTMCNC: 31.3 PG — SIGNIFICANT CHANGE UP (ref 27–34)
MCHC RBC-ENTMCNC: 32.3 GM/DL — SIGNIFICANT CHANGE UP (ref 32–36)
MCV RBC AUTO: 96.7 FL — SIGNIFICANT CHANGE UP (ref 80–100)
PLATELET # BLD AUTO: 105 K/UL — LOW (ref 150–400)
POTASSIUM SERPL-MCNC: 3.4 MMOL/L — LOW (ref 3.5–5.3)
POTASSIUM SERPL-MCNC: 3.5 MMOL/L — SIGNIFICANT CHANGE UP (ref 3.5–5.3)
POTASSIUM SERPL-SCNC: 3.4 MMOL/L — LOW (ref 3.5–5.3)
POTASSIUM SERPL-SCNC: 3.5 MMOL/L — SIGNIFICANT CHANGE UP (ref 3.5–5.3)
PROT SERPL-MCNC: 5.7 G/DL — LOW (ref 6–8.3)
RBC # BLD: 3.04 M/UL — LOW (ref 3.8–5.2)
RBC # FLD: 16.6 % — HIGH (ref 10.3–14.5)
SODIUM SERPL-SCNC: 135 MMOL/L — SIGNIFICANT CHANGE UP (ref 135–145)
SODIUM SERPL-SCNC: 137 MMOL/L — SIGNIFICANT CHANGE UP (ref 135–145)
WBC # BLD: 6.58 K/UL — SIGNIFICANT CHANGE UP (ref 3.8–10.5)
WBC # FLD AUTO: 6.58 K/UL — SIGNIFICANT CHANGE UP (ref 3.8–10.5)

## 2018-04-27 PROCEDURE — 83550 IRON BINDING TEST: CPT

## 2018-04-27 PROCEDURE — 99285 EMERGENCY DEPT VISIT HI MDM: CPT | Mod: 25

## 2018-04-27 PROCEDURE — 96374 THER/PROPH/DIAG INJ IV PUSH: CPT | Mod: XU

## 2018-04-27 PROCEDURE — 83605 ASSAY OF LACTIC ACID: CPT

## 2018-04-27 PROCEDURE — 85610 PROTHROMBIN TIME: CPT

## 2018-04-27 PROCEDURE — 84132 ASSAY OF SERUM POTASSIUM: CPT

## 2018-04-27 PROCEDURE — 85027 COMPLETE CBC AUTOMATED: CPT

## 2018-04-27 PROCEDURE — 80053 COMPREHEN METABOLIC PANEL: CPT

## 2018-04-27 PROCEDURE — 82330 ASSAY OF CALCIUM: CPT

## 2018-04-27 PROCEDURE — C1769: CPT

## 2018-04-27 PROCEDURE — 85730 THROMBOPLASTIN TIME PARTIAL: CPT

## 2018-04-27 PROCEDURE — 74328 X-RAY BILE DUCT ENDOSCOPY: CPT

## 2018-04-27 PROCEDURE — 74177 CT ABD & PELVIS W/CONTRAST: CPT

## 2018-04-27 PROCEDURE — 82803 BLOOD GASES ANY COMBINATION: CPT

## 2018-04-27 PROCEDURE — 83690 ASSAY OF LIPASE: CPT

## 2018-04-27 PROCEDURE — 76700 US EXAM ABDOM COMPLETE: CPT

## 2018-04-27 PROCEDURE — 82728 ASSAY OF FERRITIN: CPT

## 2018-04-27 PROCEDURE — 84484 ASSAY OF TROPONIN QUANT: CPT

## 2018-04-27 PROCEDURE — 94640 AIRWAY INHALATION TREATMENT: CPT

## 2018-04-27 PROCEDURE — 84295 ASSAY OF SERUM SODIUM: CPT

## 2018-04-27 PROCEDURE — 80048 BASIC METABOLIC PNL TOTAL CA: CPT

## 2018-04-27 PROCEDURE — 81001 URINALYSIS AUTO W/SCOPE: CPT

## 2018-04-27 PROCEDURE — 82746 ASSAY OF FOLIC ACID SERUM: CPT

## 2018-04-27 PROCEDURE — 83735 ASSAY OF MAGNESIUM: CPT

## 2018-04-27 PROCEDURE — 84100 ASSAY OF PHOSPHORUS: CPT

## 2018-04-27 PROCEDURE — C1876: CPT

## 2018-04-27 PROCEDURE — 96375 TX/PRO/DX INJ NEW DRUG ADDON: CPT

## 2018-04-27 PROCEDURE — 76705 ECHO EXAM OF ABDOMEN: CPT

## 2018-04-27 PROCEDURE — 74018 RADEX ABDOMEN 1 VIEW: CPT

## 2018-04-27 PROCEDURE — 85014 HEMATOCRIT: CPT

## 2018-04-27 PROCEDURE — 82435 ASSAY OF BLOOD CHLORIDE: CPT

## 2018-04-27 PROCEDURE — 82947 ASSAY GLUCOSE BLOOD QUANT: CPT

## 2018-04-27 RX ORDER — POTASSIUM CHLORIDE 20 MEQ
40 PACKET (EA) ORAL ONCE
Qty: 0 | Refills: 0 | Status: COMPLETED | OUTPATIENT
Start: 2018-04-27 | End: 2018-04-27

## 2018-04-27 RX ADMIN — Medication 5 MILLIGRAM(S): at 12:06

## 2018-04-27 NOTE — PROGRESS NOTE ADULT - SUBJECTIVE AND OBJECTIVE BOX
INTERVAL HPI/OVERNIGHT EVENTS:  no BM yesterday - received enema  refused Senna last night  appetite still remains poor  no nausea or vomiting    MEDICATIONS  (STANDING):  bisacodyl 5 milliGRAM(s) Oral at bedtime  bisacodyl Suppository 10 milliGRAM(s) Rectal once  dextrose 5% + sodium chloride 0.45%. 1000 milliLiter(s) (75 mL/Hr) IV Continuous <Continuous>  docusate sodium 100 milliGRAM(s) Oral three times a day  dronabinol 2.5 milliGRAM(s) Oral two times a day  enoxaparin Injectable 80 milliGRAM(s) SubCutaneous two times a day  famotidine  IVPB 20 milliGRAM(s) IV Intermittent once  gemcitabine IVPB 1400 milliGRAM(s) IV Intermittent once  lactulose Syrup 15 Gram(s) Oral two times a day  mirtazapine 15 milliGRAM(s) Oral at bedtime  pantoprazole   Suspension 40 milliGRAM(s) Oral before breakfast  potassium chloride    Tablet ER 40 milliEquivalent(s) Oral once  potassium chloride    Tablet ER 40 milliEquivalent(s) Oral once  senna 2 Tablet(s) Oral at bedtime  senna        MEDICATIONS  (PRN):  acetaminophen   Tablet. 650 milliGRAM(s) Oral every 8 hours PRN Mild Pain (1 - 3)  ALBUTerol    90 MICROgram(s) HFA Inhaler 2 Puff(s) Inhalation every 6 hours PRN Shortness of Breath and/or Wheezing  oxyCODONE    IR 5 milliGRAM(s) Oral every 4 hours PRN Moderate Pain (4 - 6)  prochlorperazine   Injectable 5 milliGRAM(s) IntraMuscular every 6 hours PRN nausea      Allergies  Levaquin (Other)        Vital Signs Last 24 Hrs  T(C): 37.1 (27 Apr 2018 08:55), Max: 37.1 (27 Apr 2018 08:55)  T(F): 98.7 (27 Apr 2018 08:55), Max: 98.7 (27 Apr 2018 08:55)  HR: 66 (27 Apr 2018 08:55) (66 - 69)  BP: 132/80 (27 Apr 2018 08:55) (119/76 - 143/81)  BP(mean): --  RR: 18 (27 Apr 2018 08:55) (18 - 18)  SpO2: 95% (27 Apr 2018 08:55) (95% - 97%)    PHYSICAL EXAM:  Constitutional: A&OX3, in NAD lying comfortably in bed  HEENT: NCAT, + decreased scleral icterus, no palpable LAD  Cardiovascular: RRR, S1 and S2 +murmur  Respiratory: CTAB   Gastrointestinal: obese, soft, NTND, normoactive bowel sounds, no palpable HSM  Extremities: No peripheral edema b/l     LABS:                        9.5    6.58  )-----------( 105      ( 27 Apr 2018 10:09 )             29.4     04-27    135  |  98  |  9   ----------------------------<  134<H>  3.4<L>   |  28  |  0.82    Ca    8.5      27 Apr 2018 07:21    TPro  5.7<L>  /  Alb  3.0<L>  /  TBili  1.3<H>  /  DBili  x   /  AST  25  /  ALT  43  /  AlkPhos  355<H>  04-27          RADIOLOGY & ADDITIONAL TESTS:  < from: Xray Abdomen 1 View PORTABLE -Routine (04.25.18 @ 17:51) >  INTERPRETATION:    CLINICAL INDICATION: Abdominal pain. No bowel movement for one week.    TECHNIQUE: Frontal view of the abdomen.     COMPARISON: CT abdomen pelvis from 4/15/2018.    FINDINGS:     A common bile duct stent is noted. Nonobstructive bowel gas pattern. No   free air visualized.     No abnormal calcifications identified. No acute osseous abnormalities.     IMPRESSION:     Nonobstructive bowel gas pattern. INTERVAL HPI/OVERNIGHT EVENTS:  no BM yesterday - received enema  refused Senna last night  appetite still remains poor  no nausea or vomiting    MEDICATIONS  (STANDING):  bisacodyl 5 milliGRAM(s) Oral at bedtime  bisacodyl Suppository 10 milliGRAM(s) Rectal once  dextrose 5% + sodium chloride 0.45%. 1000 milliLiter(s) (75 mL/Hr) IV Continuous <Continuous>  docusate sodium 100 milliGRAM(s) Oral three times a day  dronabinol 2.5 milliGRAM(s) Oral two times a day  enoxaparin Injectable 80 milliGRAM(s) SubCutaneous two times a day  famotidine  IVPB 20 milliGRAM(s) IV Intermittent once  gemcitabine IVPB 1400 milliGRAM(s) IV Intermittent once  lactulose Syrup 15 Gram(s) Oral two times a day  mirtazapine 15 milliGRAM(s) Oral at bedtime  pantoprazole   Suspension 40 milliGRAM(s) Oral before breakfast  potassium chloride    Tablet ER 40 milliEquivalent(s) Oral once  potassium chloride    Tablet ER 40 milliEquivalent(s) Oral once  senna 2 Tablet(s) Oral at bedtime  senna        MEDICATIONS  (PRN):  acetaminophen   Tablet. 650 milliGRAM(s) Oral every 8 hours PRN Mild Pain (1 - 3)  ALBUTerol    90 MICROgram(s) HFA Inhaler 2 Puff(s) Inhalation every 6 hours PRN Shortness of Breath and/or Wheezing  oxyCODONE    IR 5 milliGRAM(s) Oral every 4 hours PRN Moderate Pain (4 - 6)  prochlorperazine   Injectable 5 milliGRAM(s) IntraMuscular every 6 hours PRN nausea    Allergies  Levaquin (Other)    Vital Signs Last 24 Hrs  T(C): 37.1 (27 Apr 2018 08:55), Max: 37.1 (27 Apr 2018 08:55)  T(F): 98.7 (27 Apr 2018 08:55), Max: 98.7 (27 Apr 2018 08:55)  HR: 66 (27 Apr 2018 08:55) (66 - 69)  BP: 132/80 (27 Apr 2018 08:55) (119/76 - 143/81)  BP(mean): --  RR: 18 (27 Apr 2018 08:55) (18 - 18)  SpO2: 95% (27 Apr 2018 08:55) (95% - 97%)    PHYSICAL EXAM:  Constitutional: A&OX3, in NAD lying comfortably in bed  HEENT: NCAT, + decreased scleral icterus, no palpable LAD  Cardiovascular: RRR, S1 and S2 +murmur  Respiratory: CTAB   Gastrointestinal: obese, soft, NTND, normoactive bowel sounds, no palpable HSM  Extremities: No peripheral edema b/l     LABS:                      9.5    6.58  )-----------( 105      ( 27 Apr 2018 10:09 )             29.4     04-27    135  |  98  |  9   ----------------------------<  134<H>  3.4<L>   |  28  |  0.82    Ca    8.5      27 Apr 2018 07:21    TPro  5.7<L>  /  Alb  3.0<L>  /  TBili  1.3<H>  /  DBili  x   /  AST  25  /  ALT  43  /  AlkPhos  355<H>  04-27    RADIOLOGY & ADDITIONAL TESTS:  < from: Xray Abdomen 1 View PORTABLE -Routine (04.25.18 @ 17:51) >  INTERPRETATION:    CLINICAL INDICATION: Abdominal pain. No bowel movement for one week.    TECHNIQUE: Frontal view of the abdomen.     COMPARISON: CT abdomen pelvis from 4/15/2018.    FINDINGS:     A common bile duct stent is noted. Nonobstructive bowel gas pattern. No   free air visualized.     No abnormal calcifications identified. No acute osseous abnormalities.     IMPRESSION:     Nonobstructive bowel gas pattern.

## 2018-04-27 NOTE — PROGRESS NOTE ADULT - ASSESSMENT
82 year old female with history of recently diagnosed unresectable pancreatic cancer who presents with intractable abdominal pain (improving) and poor PO intolerance.    Nausea and poor PO intake- slowly improving  4/23 ERCP with sphincterotomy and metal biliary stent placement; Bilirubin improving  Constipation - likely secondary to narcotics  AXR 4/25 - NOBGP, no large fecal burden on imaging review    Rec-  Bowel regimen as ordered (pt refusing any liquid laxatives, but agreeable to tablets). No large stool burden on imaging  PO diet as tolerated  AC per primary team  Heme/Onc following  No GI objection to discharge home with bowel regimen, can follow up in office with Dr Zurita    Discussed with Medicine team  Discussed with pt, all questions answered    Joseph Burgos PA-C    [facility]  [phone]

## 2018-04-27 NOTE — PROGRESS NOTE ADULT - PROVIDER SPECIALTY LIST ADULT
Anesthesia
Gastroenterology
Heme/Onc
Internal Medicine
Gastroenterology
Gastroenterology
Heme/Onc
Internal Medicine
Internal Medicine

## 2018-04-27 NOTE — PROGRESS NOTE ADULT - ASSESSMENT
83 y/o female w hx of anal cancer in remission s/p radiation and chemo , HTN,Asthma , recently evaluated for abd pain and found to have panreatic mass with CT done on 3/27 showing 3.4 cm pancreatic head mass with moderaltely distended bladder , no dilatation of bile duct ( at the time ), and ecasemenet of adjacement vasculature , underwent EUS with bx and path consistent with pancreatic Ca per family , shceduled to start chemo at [facility] this week however presenting now with abd pain, N/V with no fever .. CT shows dilatation of biliary duct but nl Bili.. no fever or chills..   1- Abd pain /N/v : sec to cancer .. improvement in Nausea and some improvement with appetite  appetite   .. cont pain meds . cont compazine   consider  celiac plexus  block   2- pancreatic Ca : not likley to be a surgical candidate ..   TB : trending down s/p stent.    2- pancreatic Ca : d/w :  plan for inpt chemo today     3- DVT of < from: CT Abdomen and Pelvis w/ IV Cont (04.15.18 @ 21:49) >  Main portal vein thrombus. Eccentric thrombus in the distal superior   mesenteric vein.  cont lovenox : will change to  xarelto upon dc since pt and family not interested in lovenox     4- anemia : monitor H/H   5- thrombocytopenia : monitor for now ..  6- constipation : still with constiaption ..was resistent to taking meds and intermittently non compliant.. now agreeable   cont bowel regimen including enema and lactulose 83 y/o female w hx of anal cancer in remission s/p radiation and chemo , HTN,Asthma , recently evaluated for abd pain and found to have panreatic mass with CT done on 3/27 showing 3.4 cm pancreatic head mass with moderaltely distended bladder , no dilatation of bile duct ( at the time ), and ecasemenet of adjacement vasculature , underwent EUS with bx and path consistent with pancreatic Ca per family , shceduled to start chemo at [facility] this week however presenting now with abd pain, N/V with no fever .. CT shows dilatation of biliary duct but nl Bili.. no fever or chills..   1- Abd pain /N/v : sec to pancreatic CA   improvement in Nausea and some improvement with appetite  no abd pain and tolerating PO    cont pain meds  . cont compazine   celiac plexus block not needed at this time.. pt also is not very consistent with taking her meds.. many times refusing to take any meds though she has sx and complaitns     2- pancreatic Ca : not likley to be a surgical candidate ..   TB : trending down s/p stent.  pt had first dose of chemo on  4/25 with single agent gemcitabine and plan for abraxane next week. zofran / dex pre-medication  Pt will need to f/u with 's group next week     3- DVT of < from: CT Abdomen and Pelvis w/ IV Cont (04.15.18 @ 21:49) >  Main portal vein thrombus. Eccentric thrombus in the distal superior   mesenteric vein.  cont lovenox : will change to  xarelto upon dc since pt and family not interested in lovenox     4- anemia : monitor H/H   5- thrombocytopenia : monitor for now ..  6- constipation : still with constiaption ..was resistent to taking meds and intermittently non compliant..    cont bowel regimen including enema and lactulose   Xray :  no acute path  ..    D/w Pt at length .. called and discussed with

## 2018-04-27 NOTE — PROGRESS NOTE ADULT - ATTENDING COMMENTS
Pb Zurita MD, FACP, FACG, AGAF  [facility]  [phone]
Pb Zurita MD, FACP, FACG, AGAF  [facility]  [phone]
Pb Zurita MD, FACP, FACG, AGAF  [facility]  [phone]
Pb Zurita MD, FACP, FACG, AGAF  [facility]  [phone]
Pb Zurita MD, FACP, FACG, AGAF  [facility]  [phone]

## 2018-04-27 NOTE — PROGRESS NOTE ADULT - SUBJECTIVE AND OBJECTIVE BOX
Patient is a 82y old  Female who presents with a chief complaint of nausea and vomiting, inability to tolerate PO (16 Apr 2018 14:16)                                                               INTERVAL HPI/OVERNIGHT EVENTS:    REVIEW OF SYSTEMS:     CONSTITUTIONAL: No weakness, fevers or chills  EYES/ENT: No visual changes , no ear ache   NECK: No pain or stiffness  RESPIRATORY: No cough, wheezing,  No shortness of breath  CARDIOVASCULAR: No chest pain or palpitations  GASTROINTESTINAL: No abdominal pain  . No nausea, vomiting, or hematemesis; No diarrhea or constipation. No melena or hematochezia.  GENITOURINARY: No dysuria, frequency or hematuria  NEUROLOGICAL: No numbness or weakness  SKIN: No itching, burning, rashes, or lesions                                                                                                                                                                                                                                                                                 Medications:  MEDICATIONS  (STANDING):  bisacodyl 5 milliGRAM(s) Oral at bedtime  bisacodyl Suppository 10 milliGRAM(s) Rectal once  dextrose 5% + sodium chloride 0.45%. 1000 milliLiter(s) (75 mL/Hr) IV Continuous <Continuous>  docusate sodium 100 milliGRAM(s) Oral three times a day  dronabinol 2.5 milliGRAM(s) Oral two times a day  enoxaparin Injectable 80 milliGRAM(s) SubCutaneous two times a day  famotidine  IVPB 20 milliGRAM(s) IV Intermittent once  gemcitabine IVPB 1400 milliGRAM(s) IV Intermittent once  lactulose Syrup 15 Gram(s) Oral two times a day  mirtazapine 15 milliGRAM(s) Oral at bedtime  pantoprazole   Suspension 40 milliGRAM(s) Oral before breakfast  potassium chloride    Tablet ER 40 milliEquivalent(s) Oral once  senna 2 Tablet(s) Oral at bedtime  senna        MEDICATIONS  (PRN):  acetaminophen   Tablet. 650 milliGRAM(s) Oral every 8 hours PRN Mild Pain (1 - 3)  ALBUTerol    90 MICROgram(s) HFA Inhaler 2 Puff(s) Inhalation every 6 hours PRN Shortness of Breath and/or Wheezing  oxyCODONE    IR 5 milliGRAM(s) Oral every 4 hours PRN Moderate Pain (4 - 6)  prochlorperazine   Injectable 5 milliGRAM(s) IntraMuscular every 6 hours PRN nausea       Allergies    Levaquin (Other)    Intolerances      Vital Signs Last 24 Hrs  T(C): 37.1 (27 Apr 2018 08:55), Max: 37.1 (27 Apr 2018 08:55)  T(F): 98.7 (27 Apr 2018 08:55), Max: 98.7 (27 Apr 2018 08:55)  HR: 66 (27 Apr 2018 08:55) (66 - 69)  BP: 132/80 (27 Apr 2018 08:55) (119/76 - 143/81)  BP(mean): --  RR: 18 (27 Apr 2018 08:55) (18 - 18)  SpO2: 95% (27 Apr 2018 08:55) (95% - 97%)  CAPILLARY BLOOD GLUCOSE          04-26 @ 07:01  -  04-27 @ 07:00  --------------------------------------------------------  IN: 2280 mL / OUT: 0 mL / NET: 2280 mL    04-27 @ 07:01 - 04-27 @ 09:37  --------------------------------------------------------  IN: 120 mL / OUT: 400 mL / NET: -280 mL      Physical Exam:    Daily     Daily   General:  Well appearing, NAD, not cachetic  HEENT:  Nonicteric, PERRLA  CV:  RRR, S1S2   Lungs:  CTA B/L, no wheezes, rales, rhonchi  Abdomen:  Soft, non-tender, no distended, positive BS  Extremities:  2+ pulses, no c/c, no edema  Skin:  Warm and dry, no rashes  :  No metzger  Neuro:  AAOx3, non-focal, grossly intact                                                                                                                                                                                                                                                                                                LABS:                               9.0    5.39  )-----------( 98       ( 26 Apr 2018 10:09 )             27.6                      04-27    135  |  98  |  9   ----------------------------<  134<H>  3.4<L>   |  28  |  0.82    Ca    8.5      27 Apr 2018 07:21    TPro  5.7<L>  /  Alb  3.0<L>  /  TBili  1.3<H>  /  DBili  x   /  AST  25  /  ALT  43  /  AlkPhos  355<H>  04-27                       RADIOLOGY & ADDITIONAL TESTS         I personally reviewed: [  ]EKG   [  ]CXR    [  ] CT      A/P:         Discussed with :     Amanda consultants' Notes   Time spent : Patient is a 82y old  Female who presents with a chief complaint of nausea and vomiting, inability to tolerate PO (16 Apr 2018 14:16)                                                               INTERVAL HPI/OVERNIGHT EVENTS:    REVIEW OF SYSTEMS:     CONSTITUTIONAL: No weakness, fevers or chills  RESPIRATORY: No cough, wheezing,  No shortness of breath  CARDIOVASCULAR: No chest pain or palpitations  GASTROINTESTINAL: No abdominal pain  . No nausea, vomiting, no BM   GENITOURINARY: No dysuria, frequency or hematuria  NEUROLOGICAL: No numbness or weakness                                                                                                                                                                                                                                                                                 Medications:  MEDICATIONS  (STANDING):  bisacodyl 5 milliGRAM(s) Oral at bedtime  bisacodyl Suppository 10 milliGRAM(s) Rectal once  dextrose 5% + sodium chloride 0.45%. 1000 milliLiter(s) (75 mL/Hr) IV Continuous <Continuous>  docusate sodium 100 milliGRAM(s) Oral three times a day  dronabinol 2.5 milliGRAM(s) Oral two times a day  enoxaparin Injectable 80 milliGRAM(s) SubCutaneous two times a day  famotidine  IVPB 20 milliGRAM(s) IV Intermittent once  gemcitabine IVPB 1400 milliGRAM(s) IV Intermittent once  lactulose Syrup 15 Gram(s) Oral two times a day  mirtazapine 15 milliGRAM(s) Oral at bedtime  pantoprazole   Suspension 40 milliGRAM(s) Oral before breakfast  potassium chloride    Tablet ER 40 milliEquivalent(s) Oral once  senna 2 Tablet(s) Oral at bedtime  senna        MEDICATIONS  (PRN):  acetaminophen   Tablet. 650 milliGRAM(s) Oral every 8 hours PRN Mild Pain (1 - 3)  ALBUTerol    90 MICROgram(s) HFA Inhaler 2 Puff(s) Inhalation every 6 hours PRN Shortness of Breath and/or Wheezing  oxyCODONE    IR 5 milliGRAM(s) Oral every 4 hours PRN Moderate Pain (4 - 6)  prochlorperazine   Injectable 5 milliGRAM(s) IntraMuscular every 6 hours PRN nausea       Allergies    Levaquin (Other)    Intolerances      Vital Signs Last 24 Hrs  T(C): 37.1 (27 Apr 2018 08:55), Max: 37.1 (27 Apr 2018 08:55)  T(F): 98.7 (27 Apr 2018 08:55), Max: 98.7 (27 Apr 2018 08:55)  HR: 66 (27 Apr 2018 08:55) (66 - 69)  BP: 132/80 (27 Apr 2018 08:55) (119/76 - 143/81)  BP(mean): --  RR: 18 (27 Apr 2018 08:55) (18 - 18)  SpO2: 95% (27 Apr 2018 08:55) (95% - 97%)  CAPILLARY BLOOD GLUCOSE          04-26 @ 07:01 - 04-27 @ 07:00  --------------------------------------------------------  IN: 2280 mL / OUT: 0 mL / NET: 2280 mL    04-27 @ 07:01 - 04-27 @ 09:37  --------------------------------------------------------  IN: 120 mL / OUT: 400 mL / NET: -280 mL      Physical Exam:    General: NAD   HEENT:  Nonicteric, PERRLA  CV:  RRR, S1S2   Lungs:  CTA   Abdomen:  Soft, non-tender, no distended, positive BS  Extremities:  2+ pulses, no c/c, no edema  :  No metzger  Neuro:  AAOx3, non-focal, grossly intact                                                                                                                                                                                                                                                                                                LABS:                               9.0    5.39  )-----------( 98       ( 26 Apr 2018 10:09 )             27.6                      04-27    135  |  98  |  9   ----------------------------<  134<H>  3.4<L>   |  28  |  0.82    Ca    8.5      27 Apr 2018 07:21    TPro  5.7<L>  /  Alb  3.0<L>  /  TBili  1.3<H>  /  DBili  x   /  AST  25  /  ALT  43  /  AlkPhos  355<H>  04-27

## 2022-03-09 NOTE — DIETITIAN INITIAL EVALUATION ADULT. - ENERGY NEEDS
Yes - the patient is able to be screened ht:5' , wt:165 pounds, BMI:32.2 kg/m2, IBW: 100 pounds +/- 10%     Pt admitted c N+V, abdominal pain, Pt c unresectable pancreatic cancer, recently diagnosed; being followed by GI, ? celiac plexus block. Possibly to start chemo in house.

## 2023-09-17 NOTE — PATIENT PROFILE ADULT. - PSH
You must understand that you've received an Urgent Care treatment only and that you may be released before all your medical problems are known or treated. You, the patient, will arrange for follow up care as instructed.  Follow up with your PCP or specialty clinic as directed in the next 1-2 weeks if not improved or as needed.  You can call [phone] to schedule an appointment with the appropriate provider.  If your condition worsens we recommend that you receive another evaluation at the emergency room immediately or contact your primary medical clinics after hours call service to discuss your concerns.  Please return here or go to the Emergency Department for any concerns or worsening of condition.  Please if you smoke please consider quitting. [facility] Smoke cessation hotline number is [phone], available at this number is free counseling and medications to live a healthier life!       If you were prescribed a narcotic or controlled medication, do not drive or operate heavy equipment or machinery while taking these medications.    If you were not prescribed an antibiotic and your not better please return for a recheck. Antibiotic therapy is not always indicated initially.   Please attempt over the counter medications, give it time and try Echinacea, Zinc and Vitamin C to fight common colds and virus.     Apply a compressive ACE bandage. Rest and elevate the affected painful area.  Apply cold compresses intermittently as needed.  As pain recedes, begin normal activities slowly as tolerated.  Call if symptoms persist.      
H/O total hysterectomy  30 years ago

## 2025-07-09 NOTE — DISCHARGE NOTE ADULT - PRINCIPAL DIAGNOSIS
Complete rotator cuff tear or rupture of right shoulder, not specified as traumatic within normal limits